# Patient Record
Sex: FEMALE | Race: WHITE | Employment: UNEMPLOYED | URBAN - METROPOLITAN AREA
[De-identification: names, ages, dates, MRNs, and addresses within clinical notes are randomized per-mention and may not be internally consistent; named-entity substitution may affect disease eponyms.]

---

## 2018-09-20 ENCOUNTER — HOSPITAL ENCOUNTER (OUTPATIENT)
Age: 48
Discharge: HOME | End: 2018-09-20

## 2018-09-20 LAB
ALT SERPL-CCNC: 19 U/L (ref 0–32)
ANION GAP SERPL CALCULATED.3IONS-SCNC: 10 MMOL/L (ref 7–16)
AST SERPL-CCNC: 24 U/L (ref 0–31)
BASOPHILS ABSOLUTE: 0.03 E9/L (ref 0–0.2)
BASOPHILS RELATIVE PERCENT: 0.6 % (ref 0–2)
BUN BLDV-MCNC: 8 MG/DL (ref 6–20)
CALCIUM SERPL-MCNC: 9.2 MG/DL (ref 8.6–10.2)
CHLORIDE BLD-SCNC: 103 MMOL/L (ref 98–107)
CHOLESTEROL, TOTAL: 169 MG/DL (ref 0–199)
CO2: 28 MMOL/L (ref 22–29)
CORTISOL TOTAL: 8.52 MCG/DL (ref 2.68–18.4)
CREAT SERPL-MCNC: 0.6 MG/DL (ref 0.5–1)
EOSINOPHILS ABSOLUTE: 0.18 E9/L (ref 0.05–0.5)
EOSINOPHILS RELATIVE PERCENT: 3.6 % (ref 0–6)
FOLATE: 17.3 NG/ML (ref 4.8–24.2)
GFR AFRICAN AMERICAN: >60
GFR NON-AFRICAN AMERICAN: >60 ML/MIN/1.73
GLUCOSE BLD-MCNC: 91 MG/DL (ref 74–109)
HCT VFR BLD CALC: 40.1 % (ref 34–48)
HDLC SERPL-MCNC: 67 MG/DL
HEMOGLOBIN: 12.6 G/DL (ref 11.5–15.5)
IMMATURE GRANULOCYTES #: 0.02 E9/L
IMMATURE GRANULOCYTES %: 0.4 % (ref 0–5)
IRON SATURATION: 27 % (ref 15–50)
IRON: 99 MCG/DL (ref 37–145)
LDL CHOLESTEROL CALCULATED: 85 MG/DL (ref 0–99)
LYMPHOCYTES ABSOLUTE: 0.59 E9/L (ref 1.5–4)
LYMPHOCYTES RELATIVE PERCENT: 11.9 % (ref 20–42)
MAGNESIUM: 2.1 MG/DL (ref 1.6–2.6)
MCH RBC QN AUTO: 29 PG (ref 26–35)
MCHC RBC AUTO-ENTMCNC: 31.4 % (ref 32–34.5)
MCV RBC AUTO: 92.2 FL (ref 80–99.9)
MONOCYTES ABSOLUTE: 0.37 E9/L (ref 0.1–0.95)
MONOCYTES RELATIVE PERCENT: 7.4 % (ref 2–12)
NEUTROPHILS ABSOLUTE: 3.78 E9/L (ref 1.8–7.3)
NEUTROPHILS RELATIVE PERCENT: 76.1 % (ref 43–80)
PDW BLD-RTO: 14.5 FL (ref 11.5–15)
PLATELET # BLD: 221 E9/L (ref 130–450)
PMV BLD AUTO: 9.7 FL (ref 7–12)
POTASSIUM SERPL-SCNC: 4.2 MMOL/L (ref 3.5–5)
RBC # BLD: 4.35 E12/L (ref 3.5–5.5)
SEDIMENTATION RATE, ERYTHROCYTE: 19 MM/HR (ref 0–20)
SODIUM BLD-SCNC: 141 MMOL/L (ref 132–146)
T4 FREE: 1.47 NG/DL (ref 0.93–1.7)
TOTAL IRON BINDING CAPACITY: 361 MCG/DL (ref 250–450)
TRIGL SERPL-MCNC: 85 MG/DL (ref 0–149)
TSH SERPL DL<=0.05 MIU/L-ACNC: 0.04 UIU/ML (ref 0.27–4.2)
VITAMIN D 25-HYDROXY: 21 NG/ML (ref 30–100)
VLDLC SERPL CALC-MCNC: 17 MG/DL
WBC # BLD: 5 E9/L (ref 4.5–11.5)

## 2018-09-20 PROCEDURE — 85025 COMPLETE CBC W/AUTO DIFF WBC: CPT

## 2018-09-20 PROCEDURE — 84439 ASSAY OF FREE THYROXINE: CPT

## 2018-09-20 PROCEDURE — 84460 ALANINE AMINO (ALT) (SGPT): CPT

## 2018-09-20 PROCEDURE — 80048 BASIC METABOLIC PNL TOTAL CA: CPT

## 2018-09-20 PROCEDURE — 80061 LIPID PANEL: CPT

## 2018-09-20 PROCEDURE — 84450 TRANSFERASE (AST) (SGOT): CPT

## 2018-09-20 PROCEDURE — 82306 VITAMIN D 25 HYDROXY: CPT

## 2018-09-20 PROCEDURE — 82533 TOTAL CORTISOL: CPT

## 2018-09-20 PROCEDURE — 84443 ASSAY THYROID STIM HORMONE: CPT

## 2018-09-20 PROCEDURE — 83540 ASSAY OF IRON: CPT

## 2018-09-20 PROCEDURE — 83550 IRON BINDING TEST: CPT

## 2018-09-20 PROCEDURE — 85651 RBC SED RATE NONAUTOMATED: CPT

## 2018-09-20 PROCEDURE — 36415 COLL VENOUS BLD VENIPUNCTURE: CPT

## 2018-09-20 PROCEDURE — 82746 ASSAY OF FOLIC ACID SERUM: CPT

## 2018-09-20 PROCEDURE — 83735 ASSAY OF MAGNESIUM: CPT

## 2020-01-02 ENCOUNTER — APPOINTMENT (OUTPATIENT)
Dept: GENERAL RADIOLOGY | Age: 50
End: 2020-01-02
Payer: COMMERCIAL

## 2020-01-02 ENCOUNTER — HOSPITAL ENCOUNTER (OUTPATIENT)
Age: 50
Setting detail: OBSERVATION
Discharge: HOME | End: 2020-01-03
Attending: EMERGENCY MEDICINE | Admitting: INTERNAL MEDICINE
Payer: COMMERCIAL

## 2020-01-02 LAB
ANION GAP SERPL CALCULATED.3IONS-SCNC: 13 MMOL/L (ref 7–16)
BASOPHILS ABSOLUTE: 0.05 E9/L (ref 0–0.2)
BASOPHILS RELATIVE PERCENT: 0.9 % (ref 0–2)
BUN BLDV-MCNC: 10 MG/DL (ref 6–20)
CALCIUM SERPL-MCNC: 9.6 MG/DL (ref 8.6–10.2)
CHLORIDE BLD-SCNC: 102 MMOL/L (ref 98–107)
CO2: 24 MMOL/L (ref 22–29)
CREAT SERPL-MCNC: 0.6 MG/DL (ref 0.5–1)
EOSINOPHILS ABSOLUTE: 0.07 E9/L (ref 0.05–0.5)
EOSINOPHILS RELATIVE PERCENT: 1.2 % (ref 0–6)
GFR AFRICAN AMERICAN: >60
GFR NON-AFRICAN AMERICAN: >60 ML/MIN/1.73
GLUCOSE BLD-MCNC: 113 MG/DL (ref 74–99)
HCT VFR BLD CALC: 40.9 % (ref 34–48)
HEMOGLOBIN: 13.6 G/DL (ref 11.5–15.5)
IMMATURE GRANULOCYTES #: 0.02 E9/L
IMMATURE GRANULOCYTES %: 0.4 % (ref 0–5)
INFLUENZA A BY PCR: NOT DETECTED
INFLUENZA B BY PCR: NOT DETECTED
LYMPHOCYTES ABSOLUTE: 1.38 E9/L (ref 1.5–4)
LYMPHOCYTES RELATIVE PERCENT: 24.6 % (ref 20–42)
MCH RBC QN AUTO: 31.5 PG (ref 26–35)
MCHC RBC AUTO-ENTMCNC: 33.3 % (ref 32–34.5)
MCV RBC AUTO: 94.7 FL (ref 80–99.9)
MONOCYTES ABSOLUTE: 0.43 E9/L (ref 0.1–0.95)
MONOCYTES RELATIVE PERCENT: 7.7 % (ref 2–12)
NEUTROPHILS ABSOLUTE: 3.66 E9/L (ref 1.8–7.3)
NEUTROPHILS RELATIVE PERCENT: 65.2 % (ref 43–80)
PDW BLD-RTO: 12.9 FL (ref 11.5–15)
PLATELET # BLD: 245 E9/L (ref 130–450)
PMV BLD AUTO: 10.1 FL (ref 7–12)
POTASSIUM SERPL-SCNC: 4.2 MMOL/L (ref 3.5–5)
RBC # BLD: 4.32 E12/L (ref 3.5–5.5)
SODIUM BLD-SCNC: 139 MMOL/L (ref 132–146)
TROPONIN: <0.01 NG/ML (ref 0–0.03)
WBC # BLD: 5.6 E9/L (ref 4.5–11.5)

## 2020-01-02 PROCEDURE — 71045 X-RAY EXAM CHEST 1 VIEW: CPT

## 2020-01-02 PROCEDURE — 36415 COLL VENOUS BLD VENIPUNCTURE: CPT

## 2020-01-02 PROCEDURE — 6370000000 HC RX 637 (ALT 250 FOR IP): Performed by: EMERGENCY MEDICINE

## 2020-01-02 PROCEDURE — 93005 ELECTROCARDIOGRAM TRACING: CPT | Performed by: EMERGENCY MEDICINE

## 2020-01-02 PROCEDURE — 80048 BASIC METABOLIC PNL TOTAL CA: CPT

## 2020-01-02 PROCEDURE — 87502 INFLUENZA DNA AMP PROBE: CPT

## 2020-01-02 PROCEDURE — 84484 ASSAY OF TROPONIN QUANT: CPT

## 2020-01-02 PROCEDURE — 99285 EMERGENCY DEPT VISIT HI MDM: CPT

## 2020-01-02 PROCEDURE — 85025 COMPLETE CBC W/AUTO DIFF WBC: CPT

## 2020-01-02 RX ORDER — FAMOTIDINE 40 MG/1
40 TABLET, FILM COATED ORAL DAILY
Status: ON HOLD | COMMUNITY
End: 2020-01-03 | Stop reason: ALTCHOICE

## 2020-01-02 RX ORDER — ASPIRIN 81 MG/1
324 TABLET, CHEWABLE ORAL ONCE
Status: COMPLETED | OUTPATIENT
Start: 2020-01-02 | End: 2020-01-02

## 2020-01-02 RX ADMIN — ASPIRIN 81 MG CHEWABLE TABLET 242 MG: 81 TABLET CHEWABLE at 22:23

## 2020-01-02 ASSESSMENT — PAIN SCALES - GENERAL: PAINLEVEL_OUTOF10: 6

## 2020-01-02 ASSESSMENT — PAIN DESCRIPTION - ONSET: ONSET: GRADUAL

## 2020-01-02 ASSESSMENT — PAIN DESCRIPTION - PROGRESSION: CLINICAL_PROGRESSION: NOT CHANGED

## 2020-01-02 ASSESSMENT — PAIN DESCRIPTION - LOCATION: LOCATION: CHEST

## 2020-01-02 ASSESSMENT — PAIN DESCRIPTION - PAIN TYPE: TYPE: ACUTE PAIN

## 2020-01-02 ASSESSMENT — PAIN DESCRIPTION - FREQUENCY: FREQUENCY: CONTINUOUS

## 2020-01-02 ASSESSMENT — PAIN DESCRIPTION - DESCRIPTORS: DESCRIPTORS: ACHING;DISCOMFORT

## 2020-01-03 ENCOUNTER — APPOINTMENT (OUTPATIENT)
Dept: NUCLEAR MEDICINE | Age: 50
End: 2020-01-03
Payer: COMMERCIAL

## 2020-01-03 ENCOUNTER — APPOINTMENT (OUTPATIENT)
Dept: NON INVASIVE DIAGNOSTICS | Age: 50
End: 2020-01-03
Payer: COMMERCIAL

## 2020-01-03 VITALS
BODY MASS INDEX: 38.77 KG/M2 | HEART RATE: 74 BPM | RESPIRATION RATE: 18 BRPM | DIASTOLIC BLOOD PRESSURE: 58 MMHG | HEIGHT: 67 IN | SYSTOLIC BLOOD PRESSURE: 126 MMHG | OXYGEN SATURATION: 99 % | TEMPERATURE: 98.4 F | WEIGHT: 247 LBS

## 2020-01-03 PROBLEM — R07.2 PRECORDIAL CHEST PAIN: Status: ACTIVE | Noted: 2020-01-03

## 2020-01-03 PROBLEM — R07.9 CHEST PAIN: Status: ACTIVE | Noted: 2020-01-03

## 2020-01-03 LAB
ANION GAP SERPL CALCULATED.3IONS-SCNC: 15 MMOL/L (ref 7–16)
BUN BLDV-MCNC: 10 MG/DL (ref 6–20)
CALCIUM SERPL-MCNC: 9.4 MG/DL (ref 8.6–10.2)
CHLORIDE BLD-SCNC: 100 MMOL/L (ref 98–107)
CHOLESTEROL, FASTING: 194 MG/DL (ref 0–199)
CO2: 24 MMOL/L (ref 22–29)
CREAT SERPL-MCNC: 0.6 MG/DL (ref 0.5–1)
EKG ATRIAL RATE: 69 BPM
EKG P AXIS: 19 DEGREES
EKG P-R INTERVAL: 156 MS
EKG Q-T INTERVAL: 386 MS
EKG QRS DURATION: 88 MS
EKG QTC CALCULATION (BAZETT): 413 MS
EKG R AXIS: -21 DEGREES
EKG T AXIS: 33 DEGREES
EKG VENTRICULAR RATE: 69 BPM
GFR AFRICAN AMERICAN: >60
GFR NON-AFRICAN AMERICAN: >60 ML/MIN/1.73
GLUCOSE BLD-MCNC: 97 MG/DL (ref 74–99)
HCT VFR BLD CALC: 42.3 % (ref 34–48)
HDLC SERPL-MCNC: 63 MG/DL
HEMOGLOBIN: 13.5 G/DL (ref 11.5–15.5)
LDL CHOLESTEROL CALCULATED: 112 MG/DL (ref 0–99)
LV EF: 70 %
LVEF MODALITY: NORMAL
MCH RBC QN AUTO: 30.7 PG (ref 26–35)
MCHC RBC AUTO-ENTMCNC: 31.9 % (ref 32–34.5)
MCV RBC AUTO: 96.1 FL (ref 80–99.9)
PDW BLD-RTO: 13.2 FL (ref 11.5–15)
PLATELET # BLD: 228 E9/L (ref 130–450)
PMV BLD AUTO: 10.1 FL (ref 7–12)
POTASSIUM SERPL-SCNC: 3.8 MMOL/L (ref 3.5–5)
RBC # BLD: 4.4 E12/L (ref 3.5–5.5)
SODIUM BLD-SCNC: 139 MMOL/L (ref 132–146)
TRIGLYCERIDE, FASTING: 96 MG/DL (ref 0–149)
TROPONIN: <0.01 NG/ML (ref 0–0.03)
TROPONIN: <0.01 NG/ML (ref 0–0.03)
TSH SERPL DL<=0.05 MIU/L-ACNC: 0.5 UIU/ML (ref 0.27–4.2)
VITAMIN B-12: 376 PG/ML (ref 211–946)
VLDLC SERPL CALC-MCNC: 19 MG/DL
WBC # BLD: 3.9 E9/L (ref 4.5–11.5)

## 2020-01-03 PROCEDURE — 85027 COMPLETE CBC AUTOMATED: CPT

## 2020-01-03 PROCEDURE — 93016 CV STRESS TEST SUPVJ ONLY: CPT | Performed by: INTERNAL MEDICINE

## 2020-01-03 PROCEDURE — 78452 HT MUSCLE IMAGE SPECT MULT: CPT

## 2020-01-03 PROCEDURE — 84443 ASSAY THYROID STIM HORMONE: CPT

## 2020-01-03 PROCEDURE — 3430000000 HC RX DIAGNOSTIC RADIOPHARMACEUTICAL: Performed by: RADIOLOGY

## 2020-01-03 PROCEDURE — 82607 VITAMIN B-12: CPT

## 2020-01-03 PROCEDURE — G0378 HOSPITAL OBSERVATION PER HR: HCPCS

## 2020-01-03 PROCEDURE — 84484 ASSAY OF TROPONIN QUANT: CPT

## 2020-01-03 PROCEDURE — 99243 OFF/OP CNSLTJ NEW/EST LOW 30: CPT | Performed by: SURGERY

## 2020-01-03 PROCEDURE — 80048 BASIC METABOLIC PNL TOTAL CA: CPT

## 2020-01-03 PROCEDURE — 36415 COLL VENOUS BLD VENIPUNCTURE: CPT

## 2020-01-03 PROCEDURE — A9500 TC99M SESTAMIBI: HCPCS | Performed by: RADIOLOGY

## 2020-01-03 PROCEDURE — 80061 LIPID PANEL: CPT

## 2020-01-03 PROCEDURE — 93010 ELECTROCARDIOGRAM REPORT: CPT | Performed by: INTERNAL MEDICINE

## 2020-01-03 PROCEDURE — 93017 CV STRESS TEST TRACING ONLY: CPT

## 2020-01-03 PROCEDURE — 93018 CV STRESS TEST I&R ONLY: CPT | Performed by: INTERNAL MEDICINE

## 2020-01-03 RX ORDER — FAMOTIDINE 20 MG/1
20 TABLET, FILM COATED ORAL DAILY
Status: ON HOLD | COMMUNITY
End: 2020-01-03 | Stop reason: HOSPADM

## 2020-01-03 RX ORDER — LEVOTHYROXINE SODIUM 175 UG/1
175 TABLET ORAL
Status: DISCONTINUED | OUTPATIENT
Start: 2020-01-04 | End: 2020-01-03 | Stop reason: HOSPADM

## 2020-01-03 RX ORDER — LEVOTHYROXINE SODIUM 175 UG/1
175 TABLET ORAL DAILY
COMMUNITY
End: 2020-09-30

## 2020-01-03 RX ORDER — FAMOTIDINE 20 MG/1
20 TABLET, FILM COATED ORAL DAILY
Status: DISCONTINUED | OUTPATIENT
Start: 2020-01-04 | End: 2020-01-03 | Stop reason: HOSPADM

## 2020-01-03 RX ORDER — FAMOTIDINE 20 MG/1
20 TABLET, FILM COATED ORAL 2 TIMES DAILY
Qty: 60 TABLET | Refills: 0 | Status: SHIPPED | OUTPATIENT
Start: 2020-01-03 | End: 2020-09-30

## 2020-01-03 RX ORDER — FAMOTIDINE 20 MG/1
20 TABLET, FILM COATED ORAL 2 TIMES DAILY
Qty: 60 TABLET | Refills: 0 | Status: SHIPPED | OUTPATIENT
Start: 2020-01-03 | End: 2020-01-03 | Stop reason: SDUPTHER

## 2020-01-03 RX ORDER — SODIUM CHLORIDE 0.9 % (FLUSH) 0.9 %
10 SYRINGE (ML) INJECTION PRN
Status: DISCONTINUED | OUTPATIENT
Start: 2020-01-03 | End: 2020-01-03 | Stop reason: HOSPADM

## 2020-01-03 RX ORDER — ONDANSETRON 2 MG/ML
4 INJECTION INTRAMUSCULAR; INTRAVENOUS EVERY 6 HOURS PRN
Status: DISCONTINUED | OUTPATIENT
Start: 2020-01-03 | End: 2020-01-03 | Stop reason: HOSPADM

## 2020-01-03 RX ORDER — SODIUM CHLORIDE 0.9 % (FLUSH) 0.9 %
10 SYRINGE (ML) INJECTION PRN
Status: DISCONTINUED | OUTPATIENT
Start: 2020-01-03 | End: 2020-01-03 | Stop reason: SDUPTHER

## 2020-01-03 RX ORDER — SODIUM CHLORIDE 0.9 % (FLUSH) 0.9 %
10 SYRINGE (ML) INJECTION EVERY 12 HOURS SCHEDULED
Status: DISCONTINUED | OUTPATIENT
Start: 2020-01-03 | End: 2020-01-03 | Stop reason: HOSPADM

## 2020-01-03 RX ORDER — LEVOTHYROXINE SODIUM 0.2 MG/1
200 TABLET ORAL
Status: DISCONTINUED | OUTPATIENT
Start: 2020-01-03 | End: 2020-01-03

## 2020-01-03 RX ORDER — FAMOTIDINE 20 MG/1
40 TABLET, FILM COATED ORAL DAILY
Status: DISCONTINUED | OUTPATIENT
Start: 2020-01-03 | End: 2020-01-03

## 2020-01-03 RX ADMIN — Medication 12 MILLICURIE: at 08:30

## 2020-01-03 RX ADMIN — Medication 35 MILLICURIE: at 11:35

## 2020-01-03 ASSESSMENT — HEART SCORE: ECG: 1

## 2020-01-03 NOTE — PROCEDURES
Brian Lacey is a 52 y.o. female patient. 1. Precordial chest pain    2. Abnormal EKG    3. Elevated blood pressure reading      Past Medical History:   Diagnosis Date    Thyroid disease      Blood pressure 133/70, pulse 68, temperature 98.5 °F (36.9 °C), temperature source Oral, resp. rate 18, height 5' 7\" (1.702 m), weight 247 lb (112 kg), SpO2 97 %. Procedures Following placement of an intravenous catheter 10 millicuries of technetium 99m sestamibi was administered with resting SPECT images obtained approximately 45 minutes post injection. After completion of resting images, the patient exercised for 3:15 minutes on an accelerated Zia protocol treadmill achieving 6.4 METS of activity and 95% MPHR. No anginal chest pain or cardiac arrhythmias were noted. A normal blood pressure response to exercise was recorded. No electrocardiographic changes were noted. One mintue prior to the completion of exercise 30 millicuries of technetium 99m sestamibi was injected with post stress SPECT images obtained approximately 30 minutes post stress. Perfusion images pending.     Geoffrey Means MD  1/3/2020

## 2020-01-03 NOTE — ED PROVIDER NOTES
HPI:  20, Time: 10:12 PM        Effie Simon is a 52 y.o. female presenting to the ED for chest pain, beginning 3 hours ago. The complaint has been persistent, severe in severity, and worsened by nothing. She states the episode of chest pain began while she was at rest but after she had been walking around her store and she stated that symptoms were severe that she thought she might pass out. She did not have any palpitations, no syncope nor any nausea vomiting. She states she might of had mild diaphoresis. No radiation to the neck nor jaw nor to the back no radiation to the left arm reported. No other complaints at all reported. Review of Systems:   Pertinent positives and negatives are stated within HPI, all other systems reviewed and are negative.    --------------------------------------------- PAST HISTORY ---------------------------------------------  Past Medical History:  has a past medical history of Thyroid disease. Past Surgical History:  has a past surgical history that includes Cholecystectomy; Tubal ligation; Gastric bypass surgery;  section; hernia repair; Hysterectomy; and Abdomen surgery. Social History:  reports that she has never smoked. She has never used smokeless tobacco. She reports that she does not drink alcohol or use drugs. Family History: family history is not on file. The patients home medications have been reviewed. Allergies: Avelox [moxifloxacin hcl in nacl]; Cephalexin; Ciprofloxacin; Magnesium sulfate; Penicillins;  Percocet [oxycodone-acetaminophen]; and Sulfa antibiotics    -------------------------------------------------- RESULTS -------------------------------------------------  All laboratory and radiology results have been personally reviewed by myself   LABS:  Results for orders placed or performed during the hospital encounter of 20   Rapid influenza A/B antigens   Result Value Ref Range    Influenza A by PCR Not Detected Not patient and discussed todays results, in addition to providing specific details for the plan of care and counseling regarding the diagnosis and prognosis. Questions are answered at this time and they are agreeable with the plan. Radiology Procedure Waiver   Name: James Slater  : 1970  MRN: [id number]    Date:  20    Time: 10:12 PM    Benefits of immediately proceeding with Radiology exam(s) without pre-testing outweigh the risks or are not indicated as specified below and therefore the following is/are being waived:    [x] Pregnancy test   [] Patients LMP on-time and regular.   [] Patient had Tubal Ligation or has other Contraception Device. [] Patient  is Menopausal or Premenarcheal.    [x] Patient had Full or Partial Hysterectomy. [] Protocol for Iodine allergy    [] MRI Questionnaire     [] BUN/Creatinine   [] Patient age w/no hx of renal dysfunction. [] Patient on Dialysis. [] Recent Normal Labs. Electronically signed by Becki Rodney MD on 20 at 10:12 PM          --------------------------------- IMPRESSION AND DISPOSITION ---------------------------------    IMPRESSION  1. Precordial chest pain    2. Abnormal EKG    3. Elevated blood pressure reading        DISPOSITION  Disposition: Admit to telemetry OBS @ [facility]  Patient condition is stable      NOTE: This report was transcribed using voice recognition software.  Every effort was made to ensure accuracy; however, inadvertent computerized transcription errors may be present        Sree Ramos MD  20 0025

## 2020-01-03 NOTE — CONSULTS
CHOLECYSTECTOMY      GASTRIC BYPASS SURGERY      HERNIA REPAIR      HYSTERECTOMY      TUBAL LIGATION     Abdominal surgery was panniculectomy with concomitant open suprapubic hernia repair w/ mesh ~4-5 yr ago (Dr Brayan Perry at [facility])    Medications - Prior to Admission and Current Meds     Prior to Admission medications    Medication Sig Start Date End Date Taking? Authorizing Provider   famotidine (PEPCID) 40 MG tablet Take 40 mg by mouth daily   Yes Historical Provider, MD   levothyroxine (SYNTHROID) 200 MCG tablet Take 200 mcg by mouth daily. Yes Historical Provider, MD   estradiol (ESTRACE VAGINAL) 0.1 MG/GM vaginal cream Apply pea size amount vaginally 2- 3 times a week at night time 7/26/19   [facility]   clotrimazole-betamethasone (LOTRISONE) 1-0.05 % cream Apply topically 2 times daily. 7/26/19   MARY Snell   Pancrelipase, Lip-Prot-Amyl, (CREON PO) Take by mouth    Historical Provider, MD   (not taking creon)  Just taking levothyroxine and some vitamins. Inpatient:  famotidine (PEPCID) tablet 40 mg, Daily  levothyroxine (SYNTHROID) tablet 200 mcg, QAM AC  sodium chloride flush 0.9 % injection 10 mL, 2 times per day  sodium chloride flush 0.9 % injection 10 mL, PRN  magnesium hydroxide (MILK OF MAGNESIA) 400 MG/5ML suspension 30 mL, Daily PRN  ondansetron (ZOFRAN) injection 4 mg, Q6H PRN  enoxaparin (LOVENOX) injection 40 mg, Daily        Allergies   Avelox [moxifloxacin hcl in nacl]; Cephalexin; Ciprofloxacin; Magnesium sulfate; Penicillins; Percocet [oxycodone-acetaminophen]; and Sulfa antibiotics    Social History     Social History     Tobacco History     Smoking Status  Never Smoker    Smokeless Tobacco Use  Never Used          Alcohol History     Alcohol Use Status  No          Drug Use     Drug Use Status  No          Sexual Activity     Sexually Active  Not Asked                Family History   History reviewed. No pertinent family history.     Review of Systems

## 2020-01-03 NOTE — H&P
Percocet [oxycodone-acetaminophen]; and Sulfa antibiotics    Social History:      The patient currently lives at home with family     TOBACCO:   reports that she has never smoked. She has never used smokeless tobacco.  ETOH:   reports no history of alcohol use. Family History:      Reviewed in detail and negative for DM, CAD, Cancer, CVA. Positive as follows:    History reviewed. No pertinent family history. REVIEW OF SYSTEMS:   Pertinent positives as noted in the HPI. All other systems reviewed and negative. PHYSICAL EXAM:    /70   Pulse 68   Temp 98.5 °F (36.9 °C) (Oral)   Resp 18   Ht 5' 7\" (1.702 m)   Wt 247 lb (112 kg)   SpO2 97%   BMI 38.69 kg/m²     General appearance:  No apparent distress, appears stated age and cooperative. HEENT:  Normal cephalic, atraumatic without obvious deformity. Pupils equal, round, and reactive to light. Extra ocular muscles intact. Conjunctivae/corneas clear. Neck: Supple, with full range of motion. No jugular venous distention. Trachea midline. Respiratory:  Normal respiratory effort. Clear to auscultation, bilaterally without Rales/Wheezes/Rhonchi. Cardiovascular:  Regular rate and rhythm with normal S1/S2 without murmurs, rubs or gallops. Abdomen: Soft, non-tender, non-distended with normal bowel sounds. Musculoskeletal:  No clubbing, cyanosis or edema bilaterally. Full range of motion without deformity. Skin: Skin color, texture, turgor normal.  No rashes or lesions. Neurologic:  Neurovascularly intact without any focal sensory/motor deficits. Cranial nerves: II-XII intact, grossly non-focal.  Psychiatric:  Alert and oriented, thought content appropriate, normal insight    XR CHEST PORTABLE   Final Result      No acute chest process.        NM MYOCARDIAL SPECT REST EXERCISE OR RX    (Results Pending)       Labs:     Recent Labs     01/02/20  2219   WBC 5.6   HGB 13.6   HCT 40.9        Recent Labs     01/02/20  2219      K 4.2   CL 102   CO2 24   BUN 10   CREATININE 0.6   CALCIUM 9.6     No results for input(s): AST, ALT, BILIDIR, BILITOT, ALKPHOS in the last 72 hours. No results for input(s): INR in the last 72 hours. Recent Labs     01/02/20  2219   TROPONINI <0.01       Urinalysis:      Lab Results   Component Value Date    NITRU Negative 09/24/2016    BLOODU trace-intact 07/26/2019    BLOODU Negative 09/24/2016    SPECGRAV 1.010 07/26/2019    SPECGRAV 1.010 09/24/2016    GLUCOSEU negative 07/26/2019    GLUCOSEU Negative 09/24/2016         ASSESSMENT:  Atypical chest pain  Hx of gastric bypass  Hypothyroidism  Hx of esophageal spasms    PLAN:  Trend troponins, stress test in am, FLP   Check TSH given increased fatigue recently  Surgery consulted as symptoms seem more GI related (improved with pepcid) and could be related to her hx of bypass       DVT Prophylaxis: lovenox  Diet: Diet NPO Effective Now  Code Status: Full Code    PT/OT Eval Status: na    Dispo - home at [place], [place]    Thank you Vita Boone DO for the opportunity to be involved in this patient's care. If you have any questions or concerns please feel free to contact me at [phone].

## 2020-01-06 ENCOUNTER — TELEPHONE (OUTPATIENT)
Dept: SURGERY | Age: 50
End: 2020-01-06

## 2020-01-06 NOTE — TELEPHONE ENCOUNTER
FARA Maldonado called and spoke to [facility] and scheduled PT for EGD & Colonoscopy on 1/29/20 @ 12:45pm with Dr. Baron Beckham. PT denied taking any ASA products or blood thinners. PT verbalized she understood prep instructions, and NPO after midnight. PT verbalized that she understood appointment date/time, as well as to arrive 1 hours prior to procedure. PT advised on where to park and enter the hospital at for procedure. PT has been told to make sure they have a ride to and from procedure as they are not allowed to drive after procedure. PT procedure letter was mailed to them with all instructions also on it. MA instructed PT to call the office at [phone] with any questions, comments, or concerns about the procedure. MA will route message to  to send in Mirilax prep for patient to pharmacy in chart.       Electronically signed by Kimberly Edwards MA on 1/6/20 at 10:46 AM

## 2020-01-07 RX ORDER — POLYETHYLENE GLYCOL 3350 17 G/17G
510 POWDER, FOR SOLUTION ORAL DAILY
Qty: 510 G | Refills: 0 | Status: SHIPPED | OUTPATIENT
Start: 2020-01-07 | End: 2020-01-08

## 2020-01-07 NOTE — TELEPHONE ENCOUNTER
Due to her h/o gastric bypass she may want to prep over 2 days. Day #1 -- take one laxative by mouth and take 1/2 prep;  Eat easily digestible, low fiber foods.   Day #2--take 2nd laxative and finish prep and only take clear liquids  Day #3 -- colonoscopy

## 2020-01-07 NOTE — TELEPHONE ENCOUNTER
She will need miralax sent in.       Electronically signed by Sabiha Frazier MA on 1/7/20 at 9:57 AM

## 2020-01-07 NOTE — TELEPHONE ENCOUNTER
MA called and explained  concern. Patient states she doesn't have any trouble drinking so will be able to do the one day prep. She just has trouble with sugars so will have to just mix it in water. MA advised that was ok but if needed to do 2 day prep to let us know and can go over all the prep instructions for that. MA advised the prep was called into the pharmacy and the instructions letter was mailed out for her to follow.         Electronically signed by Haim Mitchell MA on 1/7/20 at 2:13 PM

## 2020-01-15 ENCOUNTER — TELEPHONE (OUTPATIENT)
Dept: SURGERY | Age: 50
End: 2020-01-15

## 2020-01-15 NOTE — TELEPHONE ENCOUNTER
MA received a call from patient in regards to her prep. Patient states she is unable to do Doculax makes her doubled over in pain ever since her bypass surgery. MA asked if she has ever taken other stool softners, Pt states she takes Milk of magnisum if needed but that's it. MA advised she would send message to  in regards to this and see what her advisement is.         Electronically signed by Matteo Kelley MA on 1/15/20 at 1:22 PM

## 2020-01-20 ENCOUNTER — TELEPHONE (OUTPATIENT)
Dept: SURGERY | Age: 50
End: 2020-01-20

## 2020-01-24 NOTE — PROGRESS NOTES
[facility] PRE-ADMISSION TESTING ENDOSCOPY INSTRUCTIONS- [facility]-phone number:[phone]    ENDOSCOPY INSTRUCTIONS:   [x] Bowel prep instructions reviewed. [x] Nothing by mouth after midnight, including gum, candy, mints, or water. Please follow your surgeons instructions if you are required to complete a bowel prep. Colonoscopy- no solid food-only clear liquids the day prior). [] You may brush your teeth, gargle, but do NOT swallow water. [x] Do not wear makeup, lotions, powders, deodorant. Nail polish as directed by the nurse. [x] Arrange transportation with a responsible adult  to and from the hospital. If you do not have a responsible adult  to transport you, you will need to make arrangements with a medical transportation company (i.e. [employer]. A Uber/taxi/bus is not appropriate unless you are accompanied by a responsible adult ). Arrange for someone to be with you for the remainder of the day and for 24 hours after your procedure due to having had anesthesia. Who will be your  for transportation?____HUSBAND______________   Who will be staying with you for 24 hrs after your procedure?______HUSBAND____________    PARKING INSTRUCTIONS:   [x] Arrival Time:___1115_____________________  · [x] Parking lot  \"I\" OR 1 is located on [facility] (the corner of [facility]). To enter, press the button and the gate will lift. A free token will be provided to exit the lot. One car per patient is allowed to park in this lot. All other cars are to park on [address] [place] [address] either in the parking garage or the handicap lot. [] To reach the [facility] lobby from [address] [place] [address], upon entering the hospital, take elevator B to the 3rd floor. EDUCATION INSTRUCTIONS:  [x] Bring a complete list of your medications, please write the last time you took the medicine, give this list to the nurse.   [x] Take the following medications the morning of surgery with 1-2 ounces of water:   [] Stop herbal supplements and vitamins 5 days before your surgery. [] DO NOT take any diabetic medicine the morning of surgery. Follow instructions for insulin the day before surgery. [] If you are diabetic and your blood sugar is low or you feel symptomatic, you may drink 1-2 ounces of apple juice or take a glucose tablet. The morning of your procedure, you may call the pre-op area if you have concerns about your blood sugar [phone]. [] Use your inhalers the morning of surgery. Bring your emergency inhaler with you day of surgery. [x] Follow physician instructions regarding any blood thinners you may be taking. WHAT TO EXPECT:  [x] The day of your procedure you will be greeted and checked in by the [facility].  In addition, you will be registered in the [facility] by a Patient Access Representative. Please bring your photo ID and insurance card. A nurse will greet you in accordance to the time you are needed in the pre-op area to prepare you for surgery. Please do not be discouraged if you are not greeted in the order you arrive as there are many variables that are involved in patient preparation. Your patience is greatly appreciated as you wait for your nurse. Please bring in items such as: books, magazines, newspapers, electronics, or any other items  to occupy your time in the waiting area. [x]  Delays may occur. Staff will make a sincere effort to keep you informed of delays. If any delays occur with your procedure, we apologize ahead of time for your inconvenience as we recognize the value of your time.

## 2020-01-28 ENCOUNTER — PREP FOR PROCEDURE (OUTPATIENT)
Dept: SURGERY | Age: 50
End: 2020-01-28

## 2020-01-28 RX ORDER — SODIUM CHLORIDE 9 MG/ML
INJECTION, SOLUTION INTRAVENOUS CONTINUOUS
Status: CANCELLED | OUTPATIENT
Start: 2020-01-28

## 2020-01-28 RX ORDER — SODIUM CHLORIDE 0.9 % (FLUSH) 0.9 %
10 SYRINGE (ML) INJECTION PRN
Status: CANCELLED | OUTPATIENT
Start: 2020-01-28

## 2020-01-28 RX ORDER — SODIUM CHLORIDE 0.9 % (FLUSH) 0.9 %
10 SYRINGE (ML) INJECTION EVERY 12 HOURS SCHEDULED
Status: CANCELLED | OUTPATIENT
Start: 2020-01-28

## 2020-01-28 NOTE — H&P
[facility]  HISTORY & PHYSICAL  ATTENDING NOTE    Chief Complaint           Chief Complaint   Patient presents with    Chest Pain       started today at 1500    Chest pain yesterday (20)     History Obtained From   patient     History of Present Illness   Martha Delvalle is a 52 y.o. female with RNY gastric bypass done at [facility] in  who presents for severe chest pain. Yesterday afternoon she was walking in the mall when severe constant substernal pain started, lasted 15 min, relieved 5min after taking pepcid. Different from her usual LUQ \"stomach pain\", but she does complain this month of more frequent LUQ pains and intermittent \"heartburns without the burn\" (like a full sensation) in her lower chest lately, often triggered by taking vitamins. Denies prior history of ulcers. She does some frequent pulling/rolling (some minor lifting) of her 20yo daughter who is immobile. Has joint pain, took 2 advil ~4 weeks ago. She took 1 baby aspirin last night in case she was having a heart attack. Denies steroids. Had some vinegar BBQ sauce and saurkraut 2 nights ago. Tolerated small bland dinner last night except increased burping and brief nausea, today just has continual fullness in lower chest like indigestion. Watery yellow diarrhea x2 last night, with some \"tingling in my head\" and cold sweats followed by feeling hot. Whole family was sick about a month ago with stomach flu briefly.  Had 2 iron infusions a few months ago and been getting B12 shots, but denies melena/hematochezia (except some hemorrhoidal blood when wiping 3 weeks ago).       Past Medical History      Past Medical History        Past Medical History:   Diagnosis Date    Thyroid disease              Past Surgical History      Past Surgical History         Past Surgical History:   Procedure Laterality Date    ABDOMEN SURGERY         SECTION         x2    CHOLECYSTECTOMY        GASTRIC BYPASS SURGERY        HERNIA normal limits.      No acute chest process.         Assessment                Hospital Problems            Last Modified POA     Chest pain 1/3/2020 Yes             48y/o F with GERD vs. Esophageal spasms and age as a risk factor for colon cancer    Plan for EGD and colonoscopy    I recommended esophagogastroduodenoscopy with possible biopsy and explained the risk, benefits, expected outcome, and alternatives to the procedure. Risks included but are not limited to bleeding, infection, respiratory distress, hypotension, and perforation of the esophagus, stomach, or duodenum. Patient understands and is in agreement. Colonoscopy. The patient was explained the risks/benefits/alternatives/expected outcomes of the procedure. The patient was explained the risks of the procedure, including, but not limited to, the risk of reaction to the anesthesia medicine and the risk of perforation requiring further surgery. The patient was informed that they may require biopsy or polypectomy. These procedures may increase the risk of complication. All questions were answered. The patient verbalized understanding and agreed to proceed.     Jaspreet Montez MD, MSc, FACS  1/28/2020  3:03 PM

## 2020-01-28 NOTE — H&P (VIEW-ONLY)
[facility]  HISTORY & PHYSICAL  ATTENDING NOTE    Chief Complaint           Chief Complaint   Patient presents with    Chest Pain       started today at 1500    Chest pain yesterday (20)     History Obtained From   patient     History of Present Illness   Martha Colon is a 52 y.o. female with RNY gastric bypass done at [facility] in  who presents for severe chest pain. Yesterday afternoon she was walking in the mall when severe constant substernal pain started, lasted 15 min, relieved 5min after taking pepcid. Different from her usual LUQ \"stomach pain\", but she does complain this month of more frequent LUQ pains and intermittent \"heartburns without the burn\" (like a full sensation) in her lower chest lately, often triggered by taking vitamins. Denies prior history of ulcers. She does some frequent pulling/rolling (some minor lifting) of her 18yo daughter who is immobile. Has joint pain, took 2 advil ~4 weeks ago. She took 1 baby aspirin last night in case she was having a heart attack. Denies steroids. Had some vinegar BBQ sauce and saurkraut 2 nights ago. Tolerated small bland dinner last night except increased burping and brief nausea, today just has continual fullness in lower chest like indigestion. Watery yellow diarrhea x2 last night, with some \"tingling in my head\" and cold sweats followed by feeling hot. Whole family was sick about a month ago with stomach flu briefly.  Had 2 iron infusions a few months ago and been getting B12 shots, but denies melena/hematochezia (except some hemorrhoidal blood when wiping 3 weeks ago).       Past Medical History      Past Medical History        Past Medical History:   Diagnosis Date    Thyroid disease              Past Surgical History      Past Surgical History         Past Surgical History:   Procedure Laterality Date    ABDOMEN SURGERY         SECTION         x2    CHOLECYSTECTOMY        GASTRIC BYPASS SURGERY        HERNIA REPAIR        HYSTERECTOMY        TUBAL LIGATION          Abdominal surgery was panniculectomy with concomitant open suprapubic hernia repair w/ mesh ~4-5 yr ago (Dr Becca Miller at [facility])     Medications - Prior to Admission and Current Meds      Home Medications           Prior to Admission medications    Medication Sig Start Date End Date Taking? Authorizing Provider   famotidine (PEPCID) 40 MG tablet Take 40 mg by mouth daily     Yes Historical Provider, MD   levothyroxine (SYNTHROID) 200 MCG tablet Take 200 mcg by mouth daily.       Yes Historical Provider, MD   estradiol (ESTRACE VAGINAL) 0.1 MG/GM vaginal cream Apply pea size amount vaginally 2- 3 times a week at night time 7/26/19     [facility]   clotrimazole-betamethasone (LOTRISONE) 1-0.05 % cream Apply topically 2 times daily. 7/26/19     [facility]   Pancrelipase, Lip-Prot-Amyl, (CREON PO) Take by mouth       Historical Provider, MD      (not taking creon)  Just taking levothyroxine and some vitamins.     Inpatient:    Current Meds Link used for Sign Out Report   famotidine (PEPCID) tablet 40 mg, Daily  levothyroxine (SYNTHROID) tablet 200 mcg, QAM AC  sodium chloride flush 0.9 % injection 10 mL, 2 times per day  sodium chloride flush 0.9 % injection 10 mL, PRN  magnesium hydroxide (MILK OF MAGNESIA) 400 MG/5ML suspension 30 mL, Daily PRN  ondansetron (ZOFRAN) injection 4 mg, Q6H PRN  enoxaparin (LOVENOX) injection 40 mg, Daily            Allergies   Avelox [moxifloxacin hcl in nacl]; Cephalexin; Ciprofloxacin; Magnesium sulfate; Penicillins;  Percocet [oxycodone-acetaminophen]; and Sulfa antibiotics     Social History          Social History           Tobacco History           Smoking Status  Never Smoker           Smokeless Tobacco Use  Never Used                  Alcohol History           Alcohol Use Status  No                  Drug Use           Drug Use Status  No                  Sexual Activity           Sexually

## 2020-01-29 ENCOUNTER — ANESTHESIA (OUTPATIENT)
Dept: ENDOSCOPY | Age: 50
End: 2020-01-29
Payer: COMMERCIAL

## 2020-01-29 ENCOUNTER — HOSPITAL ENCOUNTER (OUTPATIENT)
Age: 50
Setting detail: OUTPATIENT SURGERY
Discharge: HOME | End: 2020-01-29
Attending: SURGERY | Admitting: SURGERY
Payer: COMMERCIAL

## 2020-01-29 ENCOUNTER — ANESTHESIA EVENT (OUTPATIENT)
Dept: ENDOSCOPY | Age: 50
End: 2020-01-29
Payer: COMMERCIAL

## 2020-01-29 VITALS
WEIGHT: 247 LBS | RESPIRATION RATE: 16 BRPM | HEART RATE: 78 BPM | SYSTOLIC BLOOD PRESSURE: 124 MMHG | TEMPERATURE: 98 F | BODY MASS INDEX: 43.77 KG/M2 | DIASTOLIC BLOOD PRESSURE: 74 MMHG | HEIGHT: 63 IN | OXYGEN SATURATION: 98 %

## 2020-01-29 VITALS
RESPIRATION RATE: 15 BRPM | SYSTOLIC BLOOD PRESSURE: 93 MMHG | OXYGEN SATURATION: 98 % | DIASTOLIC BLOOD PRESSURE: 59 MMHG

## 2020-01-29 PROCEDURE — 2709999900 HC NON-CHARGEABLE SUPPLY: Performed by: SURGERY

## 2020-01-29 PROCEDURE — 3609027000 HC COLONOSCOPY: Performed by: SURGERY

## 2020-01-29 PROCEDURE — 88305 TISSUE EXAM BY PATHOLOGIST: CPT

## 2020-01-29 PROCEDURE — 3700000001 HC ADD 15 MINUTES (ANESTHESIA): Performed by: SURGERY

## 2020-01-29 PROCEDURE — 3700000000 HC ANESTHESIA ATTENDED CARE: Performed by: SURGERY

## 2020-01-29 PROCEDURE — 2580000003 HC RX 258: Performed by: SURGERY

## 2020-01-29 PROCEDURE — 43239 EGD BIOPSY SINGLE/MULTIPLE: CPT | Performed by: SURGERY

## 2020-01-29 PROCEDURE — 6360000002 HC RX W HCPCS: Performed by: ANESTHESIOLOGIST ASSISTANT

## 2020-01-29 PROCEDURE — 88342 IMHCHEM/IMCYTCHM 1ST ANTB: CPT

## 2020-01-29 PROCEDURE — 7100000011 HC PHASE II RECOVERY - ADDTL 15 MIN: Performed by: SURGERY

## 2020-01-29 PROCEDURE — 7100000010 HC PHASE II RECOVERY - FIRST 15 MIN: Performed by: SURGERY

## 2020-01-29 PROCEDURE — 3609012400 HC EGD TRANSORAL BIOPSY SINGLE/MULTIPLE: Performed by: SURGERY

## 2020-01-29 PROCEDURE — 45378 DIAGNOSTIC COLONOSCOPY: CPT | Performed by: SURGERY

## 2020-01-29 RX ORDER — SODIUM CHLORIDE 0.9 % (FLUSH) 0.9 %
10 SYRINGE (ML) INJECTION EVERY 12 HOURS SCHEDULED
Status: DISCONTINUED | OUTPATIENT
Start: 2020-01-29 | End: 2020-01-29 | Stop reason: HOSPADM

## 2020-01-29 RX ORDER — PROPOFOL 10 MG/ML
INJECTION, EMULSION INTRAVENOUS PRN
Status: DISCONTINUED | OUTPATIENT
Start: 2020-01-29 | End: 2020-01-29 | Stop reason: SDUPTHER

## 2020-01-29 RX ORDER — SODIUM CHLORIDE 0.9 % (FLUSH) 0.9 %
10 SYRINGE (ML) INJECTION PRN
Status: DISCONTINUED | OUTPATIENT
Start: 2020-01-29 | End: 2020-01-29 | Stop reason: HOSPADM

## 2020-01-29 RX ORDER — MIDAZOLAM HYDROCHLORIDE 1 MG/ML
INJECTION INTRAMUSCULAR; INTRAVENOUS PRN
Status: DISCONTINUED | OUTPATIENT
Start: 2020-01-29 | End: 2020-01-29 | Stop reason: SDUPTHER

## 2020-01-29 RX ORDER — FENTANYL CITRATE 50 UG/ML
INJECTION, SOLUTION INTRAMUSCULAR; INTRAVENOUS PRN
Status: DISCONTINUED | OUTPATIENT
Start: 2020-01-29 | End: 2020-01-29 | Stop reason: SDUPTHER

## 2020-01-29 RX ORDER — SODIUM CHLORIDE 9 MG/ML
INJECTION, SOLUTION INTRAVENOUS CONTINUOUS
Status: DISCONTINUED | OUTPATIENT
Start: 2020-01-29 | End: 2020-01-29 | Stop reason: HOSPADM

## 2020-01-29 RX ADMIN — FENTANYL CITRATE 50 MCG: 50 INJECTION, SOLUTION INTRAMUSCULAR; INTRAVENOUS at 12:51

## 2020-01-29 RX ADMIN — SODIUM CHLORIDE: 9 INJECTION, SOLUTION INTRAVENOUS at 11:32

## 2020-01-29 RX ADMIN — MIDAZOLAM 2 MG: 1 INJECTION INTRAMUSCULAR; INTRAVENOUS at 12:44

## 2020-01-29 RX ADMIN — PROPOFOL 440 MG: 10 INJECTION, EMULSION INTRAVENOUS at 12:44

## 2020-01-29 RX ADMIN — FENTANYL CITRATE 50 MCG: 50 INJECTION, SOLUTION INTRAMUSCULAR; INTRAVENOUS at 12:44

## 2020-01-29 ASSESSMENT — PAIN SCALES - GENERAL
PAINLEVEL_OUTOF10: 0

## 2020-01-29 ASSESSMENT — PAIN DESCRIPTION - LOCATION: LOCATION: ABDOMEN

## 2020-01-29 ASSESSMENT — PAIN DESCRIPTION - PAIN TYPE: TYPE: SURGICAL PAIN

## 2020-01-29 ASSESSMENT — PAIN - FUNCTIONAL ASSESSMENT: PAIN_FUNCTIONAL_ASSESSMENT: 0-10

## 2020-01-29 NOTE — OP NOTE
[facility]  PROCEDURE NOTE    DATE OF PROCEDURE: 1/29/2020    SURGEON: Larisa Sosa MD    ASSISTANT: None    PREOPERATIVE DIAGNOSIS: High risk colorectal cancer screening with history of polyps    POSTOPERATIVE DIAGNOSIS: few diverticuli, small internal hemorrhoids    OPERATION: Total colonoscopy     ANESTHESIA: Local monitored anesthesia. ESTIMATED BLOOD LOSS (ml): none    COMPLICATIONS: None    SPECIMENS:  Was Not Obtained    HISTORY: The patient is a 52y.o. year old female with history of above preop diagnosis. I recommended colonoscopy with possible biopsy or polypectomy and I explained the risk, benefits, expected outcome, and alternatives to the procedure. Risks included but are not limited to bleeding, infection, respiratory distress, hypotension, and perforation of the colon. The patient understands and is in agreement. PROCEDURE: The patient was given IV conscious sedation per anesthesia. The patient was given supplemental oxygen by nasal cannula. The colonoscope was inserted per rectum and advanced under direct vision to the cecum without difficulty. The prep was excellent so exam was adequate. FINDINGS:  Cecum/Ascending colon: few small diverticuli    Transverse colon: normal    Descending/Sigmoid colon: normal    Rectum/Anus: examined in normal and retroflexed positions and was positive for small internal hemorrhoid    The colon was decompressed and the scope was removed. The withdraw time was approximately 8 minutes. The patient tolerated the procedure well. ASSESSMENT/PLAN:   1. Diverticulosis--high fiber diet  2. Hemorrhoids--OTC meds PRN  3.  Recommend follow up colonoscopy in 10 years    Electronically signed by Larisa Sosa MD on 1/29/20 at 1:12 PM

## 2020-01-29 NOTE — PROGRESS NOTES
Discharge instructions given, medications and follow up instructions reviewed. Patient and family member verbalized understanding, no other noted or stated problems at this time. Patient will follow up with physicians as directed. Patient discharged in stable condition via wheelchair with family and ancillary personnel to vehicle.

## 2020-01-29 NOTE — ANESTHESIA PRE PROCEDURE
given: Not Answered      Vital Signs (Current): There were no vitals filed for this visit. BP Readings from Last 3 Encounters:   01/03/20 (!) 126/58   07/26/19 130/84   02/04/18 (!) 142/79       NPO Status:                                                                                 BMI:   Wt Readings from Last 3 Encounters:   01/02/20 247 lb (112 kg)   07/26/19 252 lb (114.3 kg)   02/04/18 240 lb (108.9 kg)     There is no height or weight on file to calculate BMI.    CBC:   Lab Results   Component Value Date    WBC 3.9 01/03/2020    RBC 4.40 01/03/2020    HGB 13.5 01/03/2020    HCT 42.3 01/03/2020    MCV 96.1 01/03/2020    RDW 13.2 01/03/2020     01/03/2020       CMP:   Lab Results   Component Value Date     01/03/2020    K 3.8 01/03/2020     01/03/2020    CO2 24 01/03/2020    BUN 10 01/03/2020    CREATININE 0.6 01/03/2020    GFRAA >60 01/03/2020    LABGLOM >60 01/03/2020    GLUCOSE 97 01/03/2020    GLUCOSE 88 12/08/2010    PROT 7.9 02/04/2018    CALCIUM 9.4 01/03/2020    BILITOT 0.5 02/04/2018    ALKPHOS 102 02/04/2018    AST 24 09/20/2018    ALT 19 09/20/2018       POC Tests: No results for input(s): POCGLU, POCNA, POCK, POCCL, POCBUN, POCHEMO, POCHCT in the last 72 hours. Coags: No results found for: PROTIME, INR, APTT    HCG (If Applicable): No results found for: PREGTESTUR, PREGSERUM, HCG, HCGQUANT     ABGs: No results found for: PHART, PO2ART, ULS0WXB, WMD9BZH, BEART, I3YJXHPB     Type & Screen (If Applicable):  No results found for: LENNY [facility]    Anesthesia Evaluation  Patient summary reviewed history of anesthetic complications (Prolonged Emergence.):   Airway:         Dental:          Pulmonary:Negative Pulmonary ROS                              Cardiovascular:Negative CV ROS          ECG reviewed                     ROS comment: EKG: Normal Sinus Rhythm 69.      Neuro/Psych:   Negative Neuro/Psych ROS              GI/Hepatic/Renal: Neg

## 2020-01-29 NOTE — ANESTHESIA POSTPROCEDURE EVALUATION
Department of Anesthesiology  Postprocedure Note    Patient: Chase Mendoza  MRN: [id number]  YOB: 1970  Date of evaluation: 1/29/2020  Time:  1:20 PM     Procedure Summary     Date:  01/29/20 Room / Location:  [facility] 01 / [facility]    Anesthesia Start:  1237 Anesthesia Stop:  1314    Procedures:       EGD BIOPSY (N/A )      COLORECTAL CANCER SCREENING, HIGH RISK (N/A ) Diagnosis:  (GERD, HX OF POLYPS)    Surgeon:  Cristopher Olivarez MD Responsible Provider:  Idalia Birmingham MD    Anesthesia Type:  MAC ASA Status:  2          Anesthesia Type: MAC    Milton Phase I: Milton Score: 10    Milton Phase II:      Last vitals: Reviewed and per EMR flowsheets.        Anesthesia Post Evaluation    Patient location during evaluation: PACU  Patient participation: complete - patient participated  Level of consciousness: awake  Pain score: 0  Airway patency: patent  Nausea & Vomiting: no nausea  Complications: no  Cardiovascular status: blood pressure returned to baseline  Respiratory status: acceptable  Hydration status: euvolemic

## 2020-01-29 NOTE — OP NOTE
[facility]  ENDOSCOPY LAB  UPPER ENDOSCOPY REPORT      DATE OF PROCEDURE: 1/29/2020     PREOPERATIVE DIAGNOSIS: h/o RYGB, atypical chest pain    POSTOPERATIVE DIAGNOSIS/FINDINGS: gastric pouch polyps    SURGEON: Steven Mahmood MD    ASSISTANT: non    OPERATION: Esophagogastroduodenoscopy with biopsies    ANESTHESIA: Local monitored anesthesia. COMPLICATIONS: None. EBL:  5cc    PRIOR TO EXAM: Gen: comfortable, no distress, awake and alert; Lungs: Clear;  Heart:regular rate and rhythm, normal S1S2     BRIEF HISTORY:  This is a 52 y.o. female who presents with the complaint of atypical chest pain. My recommendation is to proceed with esophagogastroduodenoscopy. The patient was advised of the risks, benefits, complications and options including the risk of bleeding and perforation. The patient understood and agreed to proceed. PROCEDURE:  Under [facility] anesthesia, the patient was positioned in the left side down decubitus position. A bite block was inserted. Under direct visualization the scope was passed through the oral cavity, into the esophagus and then into the gastric pouch. The small bowel was normal  The gastric pouch was had 2 benign appearing polyps ~5mm, these were biopsied.   GEJ and esophagus were normal    THE PATIENT TOLERATED THE PROCEDURE WELL      SPECIMENS:  Gastric pouch biopsies    PLAN:  F/u biopsy results      Steven Mahmood MD  1/29/2020  1:15 PM

## 2020-02-04 ENCOUNTER — TELEPHONE (OUTPATIENT)
Dept: SURGERY | Age: 50
End: 2020-02-04

## 2020-09-30 ENCOUNTER — OFFICE VISIT (OUTPATIENT)
Dept: ENDOCRINOLOGY | Age: 50
End: 2020-09-30
Payer: COMMERCIAL

## 2020-09-30 VITALS
HEART RATE: 82 BPM | OXYGEN SATURATION: 93 % | BODY MASS INDEX: 44.65 KG/M2 | DIASTOLIC BLOOD PRESSURE: 76 MMHG | SYSTOLIC BLOOD PRESSURE: 132 MMHG | TEMPERATURE: 98.4 F | WEIGHT: 252 LBS | RESPIRATION RATE: 16 BRPM | HEIGHT: 63 IN

## 2020-09-30 PROCEDURE — 99204 OFFICE O/P NEW MOD 45 MIN: CPT | Performed by: INTERNAL MEDICINE

## 2020-09-30 RX ORDER — LEVOTHYROXINE SODIUM 112 UG/ML
112 SOLUTION ORAL EVERY MORNING
Qty: 30 ML | Refills: 3 | Status: SHIPPED
Start: 2020-09-30 | End: 2020-10-12 | Stop reason: SDUPTHER

## 2020-09-30 RX ORDER — LEVOTHYROXINE SODIUM 125 UG/1
CAPSULE ORAL DAILY
COMMUNITY
Start: 2020-09-18 | End: 2020-09-30

## 2020-09-30 RX ORDER — LORAZEPAM 0.5 MG/1
TABLET ORAL
COMMUNITY

## 2020-09-30 RX ORDER — OMEPRAZOLE 20 MG/1
20 CAPSULE, DELAYED RELEASE ORAL DAILY
Status: ON HOLD | COMMUNITY
End: 2022-03-21

## 2020-09-30 NOTE — PROGRESS NOTES
[address] [place] [address] Department of Endocrinology Diabetes and Metabolism   [address] [facility] [address]   Phone: [phone]  Fax: [phone]    Date of Service: 2020    Primary Care Physician: Linyd Hernandez DO  Referring physician: No ref. provider found  Provider: Marilynn Tompkins MD        Reason for the visit:  Primary Hypothyroidism    History of Present Illness: The history is provided by the patient. No  was used. Accuracy of the patient data is excellent. Ivy Parisi is a very pleasant 48 y.o. female seen today for management of hypothyroidism     The patent was diagnosed with hypothyroidism in her early 's. She is currently on Tirosint 125 mcg daily. Patient takes Tirosint in the morning at empty stomach, wait one hour before eating , avoid multivitamins containing calcium  or iron with it. She wasn't able to tolerate Levothyroxine, name brand synthroid    I have reviewed all previous labs and over the past 3 years TSH was consistently suppressed     Patient reported intermittent palpitations, feeling jittery, unexplained weight gain, fatigue and depressed mood. She had gastric bypass surgery long time ago but unfortunately regained most of her wt back     She reported remote h/o thyroid nodules. No recent thyroid [address] denies any new lumps, bumps in the neck, voice change, or shortness of breath. No family history of thyroid cancer. No prior history of radiation to head or neck region.     PAST MEDICAL HISTORY   Past Medical History:   Diagnosis Date    Prolonged emergence from general anesthesia     SENSITIVE TO MEDS, TAKES LESS TO PUT HER UNDER, DIFFICULTY WAKING, STOPS BREATHING PER PATIENT    Thyroid disease        PAST SURGICAL HISTORY   Past Surgical History:   Procedure Laterality Date    ABDOMEN SURGERY       SECTION      x2    CHOLECYSTECTOMY      COLONOSCOPY N/A 2020    COLORECTAL CANCER SCREENING, HIGH RISK performed by Caridad Cortes MD at [facility]      TUBAL LIGATION      UPPER GASTROINTESTINAL ENDOSCOPY N/A 1/29/2020    EGD BIOPSY performed by Caridad Cortes MD at [address]   Tobacco:   reports that she has never smoked. She has never used smokeless tobacco.  Alcohol:   reports no history of alcohol use. Drugs:   reports no history of drug use. FAMILY HISTORY   No family history on file. ALLERGIES AND DRUG REACTIONS   Allergies   Allergen Reactions    Avelox [Moxifloxacin Hcl In Nacl]     Cephalexin     Ciprofloxacin     Magnesium Sulfate     Penicillins     Percocet [Oxycodone-Acetaminophen]     Sulfa Antibiotics        CURRENT MEDICATIONS   Current Outpatient Medications   Medication Sig Dispense Refill    omeprazole (PRILOSEC) 20 MG delayed release capsule Take 20 mg by mouth daily      TIROSINT 125 MCG CAPS daily      LORazepam (ATIVAN) 0.5 MG tablet lorazepam 0.5 mg tablet       No current facility-administered medications for this visit. Review of Systems  Constitutional: No fever, no chills, no diaphoresis, no generalized weakness. HEENT: No blurred vision, No sore throat, no ear pain, no hair loss  Neck: denied any neck swelling, difficulty swallowing,   Cadrdiopulomary: No CP, SOB or palpitation, No orthopnea or PND. No cough or wheezing. GI: No N/V/D, no constipation, No abdominal pain, no melena or hematochezia   : Denied any dysuria, hematuria, flank pain, discharge, or incontinence. Skin: denied any rash, ulcer, Hirsute, or hyperpigmentation. MSK: denied any joint deformity, joint pain/swelling, muscle pain, or back pain.   Neuro: no numbess, no tingling, no weakness,     OBJECTIVE    /72   Pulse 82   Temp 98.4 °F (36.9 °C)   Resp 16   Ht 5' 3\" (1.6 m)   Wt 252 lb (114.3 kg)   SpO2 93%   BMI 44.64 kg/m²   BP Readings from Last 4 Encounters:   09/30/20 128/72   01/29/20 124/74   01/29/20 (!) 93/59   01/03/20 (!) 126/58     Wt Readings from Last 6 Encounters:   09/30/20 252 lb (114.3 kg)   01/29/20 247 lb (112 kg)   01/02/20 247 lb (112 kg)   07/26/19 252 lb (114.3 kg)   02/04/18 240 lb (108.9 kg)       Physical examination:  General: awake alert, oriented x3, no abnormal position or movements. HEENT: normocephalic non traumatic  Neck: supple, no LN enlargement, no thyroid tenderness, no JVD. Pulm: Clear equal air entry no added sounds, no wheezing or rhonchi    CVS: S1 + S2, no murmur, no heave. Dorsalis pedis pulse palpable   Abd: soft lax, no tenderness, no organomegaly, audible bowel sounds. Skin: warm, no lesions, no rash.   Musculoskeletal: No back tenderness, no kyphosis/scoliosis   Neuro: CN intact, sensation normal , muscle power normal.  Psych: normal mood, and affect    Review of Laboratory Data:  I have reviewed the following:  Lab Results   Component Value Date/Time    WBC 3.9 (L) 01/03/2020 08:11 AM    RBC 4.40 01/03/2020 08:11 AM    HGB 13.5 01/03/2020 08:11 AM    HCT 42.3 01/03/2020 08:11 AM    MCV 96.1 01/03/2020 08:11 AM    MCH 30.7 01/03/2020 08:11 AM    MCHC 31.9 (L) 01/03/2020 08:11 AM    RDW 13.2 01/03/2020 08:11 AM     01/03/2020 08:11 AM    MPV 10.1 01/03/2020 08:11 AM      Lab Results   Component Value Date/Time     01/03/2020 02:24 AM    K 3.8 01/03/2020 02:24 AM    CO2 24 01/03/2020 02:24 AM    BUN 10 01/03/2020 02:24 AM    CREATININE 0.6 01/03/2020 02:24 AM    CALCIUM 9.4 01/03/2020 02:24 AM    LABGLOM >60 01/03/2020 02:24 AM    GFRAA >60 01/03/2020 02:24 AM      Lab Results   Component Value Date/Time    TSH 0.502 01/03/2020 02:24 AM    T4FREE 1.47 09/20/2018 09:22 AM     Lab Results   Component Value Date    GLUCOSE 97 01/03/2020    GLUCOSE 88 12/08/2010     Lab Results   Component Value Date    TRIG 85 09/20/2018    HDL 63 01/03/2020    LDLCALC 112 01/03/2020    CHOL 169 09/20/2018     Lab Results   Component Value Date    VITD25 21 09/20/2018    VITD25 34 09/24/2016       Medical Records/Labs/Images Review:   I personally reviewed and summarized previous records   All labs and imaging were reviewed independently    Joaquin Funez 32, a 48 y.o.-old female seen in for following issues     Primary hypothyroidism   · Change Tirosint to 112 mcg daily   · Recheck labs in 6-8 weeks   · Discussed the risk of cardiac arrhythmia and loss of bone density with over treating hypothyroidism   · Pt was seen by an endocrinologist at [facility] and per pt there was a concern about pituitary disorder. I have reviewed all previous labs and didn't see any abnormal Pituitary results   · Will obtain pituitary hormones     VitD deficiency  · Continue vitD supplement   · Check level      H/o thyroid nodules   · Pt will being us her previous US DVD to review     Return in about 4 months (around 1/30/2021) for Hypothyroidism . The above issues were reviewed with the patient who understood and agreed with the plan. Thank you for allowing us to participate in the care of this patient. Please do not hesitate to contact us with any additional questions. Diagnosis Orders   1. Hypothyroidism, unspecified type  T4, Free    T4    TSH without Reflex    Thyroid AB    TIROSINT- MCG/ML SOLN   2. Pituitary disorder (HCC)  T4, Free    T4    TSH without Reflex    Follicle Stimulating Hormone    Insulin-Like Growth Factor    Estradiol    Cortisol Total    Comprehensive Metabolic Panel    Prolactin    Luteinizing Hormone   3. H/O thyroid nodule     4. Abnormal thyroid function test  THYROID STIMULATING IMMUNOGLOBULIN       Yo Viera MD  Endocrinologist, [facility]   [address] [place] [address]   Phone: [phone]  Fax: [phone]  ------------------------------  An electronic signature was used to authenticate this note.  Seun Marte MD on 9/30/2020 at 2:51

## 2020-10-02 PROBLEM — Z86.39 H/O THYROID NODULE: Status: ACTIVE | Noted: 2020-10-02

## 2020-10-02 PROBLEM — E23.7 PITUITARY DISORDER (HCC): Status: ACTIVE | Noted: 2020-10-02

## 2020-10-02 PROBLEM — E03.9 HYPOTHYROIDISM: Status: ACTIVE | Noted: 2020-10-02

## 2020-10-03 ENCOUNTER — HOSPITAL ENCOUNTER (OUTPATIENT)
Age: 50
Discharge: HOME | End: 2020-10-03
Payer: COMMERCIAL

## 2020-10-03 LAB
ALBUMIN SERPL-MCNC: 4.1 G/DL (ref 3.5–5.2)
ALP BLD-CCNC: 97 U/L (ref 35–104)
ALT SERPL-CCNC: 12 U/L (ref 0–32)
ANION GAP SERPL CALCULATED.3IONS-SCNC: 9 MMOL/L (ref 7–16)
AST SERPL-CCNC: 18 U/L (ref 0–31)
BILIRUB SERPL-MCNC: 0.5 MG/DL (ref 0–1.2)
BUN BLDV-MCNC: 13 MG/DL (ref 6–20)
CALCIUM SERPL-MCNC: 9.5 MG/DL (ref 8.6–10.2)
CHLORIDE BLD-SCNC: 103 MMOL/L (ref 98–107)
CO2: 28 MMOL/L (ref 22–29)
CREAT SERPL-MCNC: 0.7 MG/DL (ref 0.5–1)
ESTRADIOL LEVEL: 10.8 PG/ML
FOLLICLE STIMULATING HORMONE: 120.8 MIU/ML
GFR AFRICAN AMERICAN: >60
GFR NON-AFRICAN AMERICAN: >60 ML/MIN/1.73
GLUCOSE BLD-MCNC: 92 MG/DL (ref 74–99)
LUTEINIZING HORMONE: 58.3 MIU/ML
POTASSIUM SERPL-SCNC: 4.1 MMOL/L (ref 3.5–5)
PROLACTIN: 12.47 NG/ML
SODIUM BLD-SCNC: 140 MMOL/L (ref 132–146)
TOTAL PROTEIN: 7.5 G/DL (ref 6.4–8.3)
TSH SERPL DL<=0.05 MIU/L-ACNC: 0.49 UIU/ML (ref 0.27–4.2)

## 2020-10-03 PROCEDURE — 84445 ASSAY OF TSI GLOBULIN: CPT

## 2020-10-03 PROCEDURE — 83002 ASSAY OF GONADOTROPIN (LH): CPT

## 2020-10-03 PROCEDURE — 86800 THYROGLOBULIN ANTIBODY: CPT

## 2020-10-03 PROCEDURE — 80053 COMPREHEN METABOLIC PANEL: CPT

## 2020-10-03 PROCEDURE — 36415 COLL VENOUS BLD VENIPUNCTURE: CPT

## 2020-10-03 PROCEDURE — 83001 ASSAY OF GONADOTROPIN (FSH): CPT

## 2020-10-03 PROCEDURE — 84305 ASSAY OF SOMATOMEDIN: CPT

## 2020-10-03 PROCEDURE — 84439 ASSAY OF FREE THYROXINE: CPT

## 2020-10-03 PROCEDURE — 82533 TOTAL CORTISOL: CPT

## 2020-10-03 PROCEDURE — 86376 MICROSOMAL ANTIBODY EACH: CPT

## 2020-10-03 PROCEDURE — 84146 ASSAY OF PROLACTIN: CPT

## 2020-10-03 PROCEDURE — 82670 ASSAY OF TOTAL ESTRADIOL: CPT

## 2020-10-03 PROCEDURE — 84443 ASSAY THYROID STIM HORMONE: CPT

## 2020-10-04 ENCOUNTER — TELEPHONE (OUTPATIENT)
Dept: ENDOCRINOLOGY | Age: 50
End: 2020-10-04

## 2020-10-04 LAB
CORTISOL TOTAL: 16.19 MCG/DL (ref 2.68–18.4)
T4 FREE: 1.3 NG/DL (ref 0.93–1.7)
T4 TOTAL: 8.4 MCG/DL (ref 4.5–11.7)

## 2020-10-07 LAB
THYROGLOBULIN AB: <0.9 IU/ML (ref 0–4)
THYROID PEROXIDASE (TPO) ABS: 2.6 IU/ML (ref 0–9)

## 2020-10-08 ENCOUNTER — TELEPHONE (OUTPATIENT)
Dept: ENDOCRINOLOGY | Age: 50
End: 2020-10-08

## 2020-10-08 LAB
IGF-1 (INSULIN-LIKE GROWTH I): 120 NG/ML (ref 57–236)
INSULIN-LIKE GROWTH FACTOR-1 Z-SCORE: -0.1
THYROID STIMULATING IMMUNOGLOBULIN: <0.1 IU/L

## 2020-10-08 NOTE — TELEPHONE ENCOUNTER
Patient has concerns regarding her tirosint PA. She is waiting for approval for liquid Tirosint. Since she has not nelsy able to get the dose she needs she has been taking previous dose which is higher than recommended and is not feeling well.   Please advise

## 2020-10-09 ENCOUNTER — TELEPHONE (OUTPATIENT)
Dept: ENDOCRINOLOGY | Age: 50
End: 2020-10-09

## 2020-10-09 NOTE — TELEPHONE ENCOUNTER
Tim Wakefield,   Please check what happen with Tirosin liquid PA    She is not able to tolerate capsules forms

## 2020-10-09 NOTE — TELEPHONE ENCOUNTER
Notify pt,  I have reviewed your recent results    Great! All antibodies are negative.      This confirm what I told you, you have hypothyroidism and previous dose was high

## 2020-10-09 NOTE — TELEPHONE ENCOUNTER
Called pt to inform, expressed verbal understanding. Also informed pt tirosint was approved. Sent to pharmacy.

## 2020-10-12 RX ORDER — LEVOTHYROXINE SODIUM 112 UG/ML
112 SOLUTION ORAL EVERY MORNING
Qty: 30 ML | Refills: 5 | Status: SHIPPED
Start: 2020-10-12 | End: 2020-12-10

## 2020-10-16 ENCOUNTER — TELEPHONE (OUTPATIENT)
Dept: ENDOCRINOLOGY | Age: 50
End: 2020-10-16

## 2020-10-16 NOTE — TELEPHONE ENCOUNTER
The pt has been on the liquid Tirosint 112mcg for 2 days. She states that she now has jaw pain, ear pressure, and head pain. Please advise.

## 2020-12-02 ENCOUNTER — HOSPITAL ENCOUNTER (OUTPATIENT)
Age: 50
Discharge: HOME | End: 2020-12-02
Payer: COMMERCIAL

## 2020-12-02 LAB
T4 FREE: 1.24 NG/DL (ref 0.93–1.7)
TSH SERPL DL<=0.05 MIU/L-ACNC: 5.64 UIU/ML (ref 0.27–4.2)

## 2020-12-02 PROCEDURE — 84443 ASSAY THYROID STIM HORMONE: CPT

## 2020-12-02 PROCEDURE — 84439 ASSAY OF FREE THYROXINE: CPT

## 2020-12-02 PROCEDURE — 36415 COLL VENOUS BLD VENIPUNCTURE: CPT

## 2020-12-10 RX ORDER — LEVOTHYROXINE SODIUM 112 UG/1
CAPSULE ORAL
Qty: 90 CAPSULE | Refills: 3 | Status: SHIPPED
Start: 2020-12-10 | End: 2021-06-02

## 2020-12-22 ENCOUNTER — OFFICE VISIT (OUTPATIENT)
Dept: ENDOCRINOLOGY | Age: 50
End: 2020-12-22
Payer: COMMERCIAL

## 2020-12-22 ENCOUNTER — TELEPHONE (OUTPATIENT)
Dept: ENDOCRINOLOGY | Age: 50
End: 2020-12-22

## 2020-12-22 VITALS
TEMPERATURE: 96.8 F | WEIGHT: 255 LBS | OXYGEN SATURATION: 95 % | DIASTOLIC BLOOD PRESSURE: 72 MMHG | BODY MASS INDEX: 45.17 KG/M2 | HEART RATE: 73 BPM | SYSTOLIC BLOOD PRESSURE: 124 MMHG

## 2020-12-22 PROCEDURE — 99214 OFFICE O/P EST MOD 30 MIN: CPT | Performed by: INTERNAL MEDICINE

## 2020-12-22 NOTE — TELEPHONE ENCOUNTER
Notify pt,  I was able to open your thyroid US dvd. Both Thyroid US didn't show any discrete nodules and calcification seen on US looks to be an atrophic calcification    Will repeat thyroid US in 1/2021.  Ok to do US at [facility]

## 2020-12-22 NOTE — PROGRESS NOTES
[address] [place] [address] Department of Endocrinology Diabetes and Metabolism   [address] [facility] [address]   Phone: [phone]  Fax: [phone]    Date of Service: 12/22/2020    Primary Care Physician: Ariel Casarez DO  Referring physician: No ref. provider found  Provider: Melina Hodgkins MD        Reason for the visit:  Primary Hypothyroidism    History of Present Illness: The history is provided by the patient. No  was used. Accuracy of the patient data is excellent. Alex Reyes is a very pleasant 48 y.o. female seen today for management of hypothyroidism     The patent was diagnosed with hypothyroidism in her early 19's. She is currently on Tirosint 112 mcg daily. Patient takes Tirosint in the morning at empty stomach, wait one hour before eating , avoid multivitamins containing calcium  or iron with it. Lab Results   Component Value Date/Time    TSH 5.640 (H) 12/02/2020 11:38 AM    T4FREE 1.24 12/02/2020 11:38 AM     She wasn't able to tolerate Levothyroxine, name brand synthroid    Patient still c/o fatigue     She had gastric bypass surgery long time ago but unfortunately regained most of her wt back     MNG   Thyroid US 10/2019   Atrophic heterogenous  thyroid gland   Rt lobe measures 2.6 x 0.7 x 0.7 cm, Lt lobe measures 2.1 x \0.8 x 0.5 cm, isthmus measures 0.1 cm   Non specific calcification in interpolar region measures 0.2 cm      Alex Reyes reported some discomfort at thyroid level but denies any voice change, or shortness of breath. No family history of thyroid cancer. No prior history of radiation to head or neck region.     PAST MEDICAL HISTORY   Past Medical History:   Diagnosis Date    Prolonged emergence from general anesthesia     SENSITIVE TO MEDS, TAKES LESS TO PUT HER UNDER, DIFFICULTY WAKING, STOPS BREATHING PER PATIENT    Thyroid disease        PAST SURGICAL HISTORY   Past Surgical History:   Procedure Laterality Date    ABDOMEN SURGERY       SECTION      x2    CHOLECYSTECTOMY      COLONOSCOPY N/A 2020    COLORECTAL CANCER SCREENING, HIGH RISK performed by Natasha Perry MD at [facility]      TUBAL LIGATION      UPPER GASTROINTESTINAL ENDOSCOPY N/A 2020    EGD BIOPSY performed by Natasha Perry MD at [facility] [address]   Tobacco:   reports that she has never smoked. She has never used smokeless tobacco.  Alcohol:   reports no history of alcohol use. Drugs:   reports no history of drug use. FAMILY HISTORY   No family history on file. ALLERGIES AND DRUG REACTIONS   Allergies   Allergen Reactions    Avelox [Moxifloxacin Hcl In Nacl]     Cephalexin     Ciprofloxacin     Magnesium Sulfate     Penicillins     Percocet [Oxycodone-Acetaminophen]     Sulfa Antibiotics        CURRENT MEDICATIONS   Current Outpatient Medications   Medication Sig Dispense Refill    Levothyroxine Sodium (TIROSINT) 112 MCG CAPS One tab by mouth daily 90 capsule 3    clobetasol (TEMOVATE) 0.05 % ointment Apply topically 2 times daily. 60 g 0    estradiol (ESTRACE VAGINAL) 0.1 MG/GM vaginal cream Apply pea size amount vaginally 3 times a week at night time 60 g 3    estradiol (ESTRACE) 0.1 MG/GM vaginal cream Apply pea size amount vaginally 2- 3 times a week at night time 1 Tube 0    omeprazole (PRILOSEC) 20 MG delayed release capsule Take 20 mg by mouth daily      LORazepam (ATIVAN) 0.5 MG tablet lorazepam 0.5 mg tablet       No current facility-administered medications for this visit. Review of Systems  Constitutional: No fever, no chills, no diaphoresis, no generalized weakness. HEENT: No blurred vision, No sore throat, no ear pain, no hair loss  Neck: denied any neck swelling, difficulty swallowing,   Cadrdiopulomary: No CP, SOB or palpitation, No orthopnea or PND. No cough or wheezing.   GI: No N/V/D, no constipation, No abdominal pain, no melena or hematochezia   : Denied any dysuria, hematuria, flank pain, discharge, or incontinence. Skin: denied any rash, ulcer, Hirsute, or hyperpigmentation. MSK: denied any joint deformity, joint pain/swelling, muscle pain, or back pain. Neuro: no numbess, no tingling, no weakness,     OBJECTIVE    /72   Pulse 73   Temp 96.8 °F (36 °C)   Wt 255 lb (115.7 kg)   SpO2 95%   BMI 45.17 kg/m²   BP Readings from Last 4 Encounters:   12/22/20 124/72   11/19/20 129/83   09/30/20 132/76   01/29/20 124/74     Wt Readings from Last 6 Encounters:   12/22/20 255 lb (115.7 kg)   11/19/20 256 lb (116.1 kg)   09/30/20 252 lb (114.3 kg)   01/29/20 247 lb (112 kg)   01/02/20 247 lb (112 kg)   07/26/19 252 lb (114.3 kg)       Physical examination:  General: awake alert, oriented x3, no abnormal position or movements. HEENT: normocephalic non traumatic  Neck: supple, no LN enlargement, no thyroid tenderness, no JVD. Pulm: Clear equal air entry no added sounds, no wheezing or rhonchi    CVS: S1 + S2, no murmur, no heave. Dorsalis pedis pulse palpable   Abd: soft lax, no tenderness, no organomegaly, audible bowel sounds. Skin: warm, no lesions, no rash.   Musculoskeletal: No back tenderness, no kyphosis/scoliosis   Neuro: CN intact, sensation normal , muscle power normal.  Psych: normal mood, and affect    Review of Laboratory Data:  I have reviewed the following:  Lab Results   Component Value Date/Time    WBC 3.9 (L) 01/03/2020 08:11 AM    RBC 4.40 01/03/2020 08:11 AM    HGB 13.5 01/03/2020 08:11 AM    HCT 42.3 01/03/2020 08:11 AM    MCV 96.1 01/03/2020 08:11 AM    MCH 30.7 01/03/2020 08:11 AM    MCHC 31.9 (L) 01/03/2020 08:11 AM    RDW 13.2 01/03/2020 08:11 AM     01/03/2020 08:11 AM    MPV 10.1 01/03/2020 08:11 AM      Lab Results   Component Value Date/Time     10/03/2020 08:12 AM    K 4.1 10/03/2020 08:12 AM    CO2 28 10/03/2020 08:12 AM    BUN 13 10/03/2020 08:12 AM questions. Diagnosis Orders   1. Hypothyroidism, unspecified type  TSH without Reflex    T4, Free       Servando Deluca MD  Endocrinologist, [facility] Endocrinology   [address] [facility], [address] [place] [address],[facility] [id number]   Phone: [phone]  Fax: [phone]  ------------------------------  An electronic signature was used to authenticate this note.  Dony Briseno MD on 12/22/2020 at 11:03 AM

## 2020-12-31 ENCOUNTER — HOSPITAL ENCOUNTER (OUTPATIENT)
Dept: ULTRASOUND IMAGING | Age: 50
Discharge: HOME | End: 2021-01-02
Payer: COMMERCIAL

## 2020-12-31 DIAGNOSIS — E04.2 MULTINODULAR GOITER: ICD-10-CM

## 2020-12-31 DIAGNOSIS — E03.9 HYPOTHYROIDISM, UNSPECIFIED TYPE: ICD-10-CM

## 2020-12-31 PROCEDURE — 76536 US EXAM OF HEAD AND NECK: CPT

## 2021-01-03 ENCOUNTER — TELEPHONE (OUTPATIENT)
Dept: ENDOCRINOLOGY | Age: 51
End: 2021-01-03

## 2021-01-22 DIAGNOSIS — E23.7 PITUITARY DISORDER (HCC): Primary | ICD-10-CM

## 2021-01-22 DIAGNOSIS — E04.2 MULTINODULAR GOITER: ICD-10-CM

## 2021-01-22 DIAGNOSIS — E03.9 HYPOTHYROIDISM, UNSPECIFIED TYPE: ICD-10-CM

## 2021-01-22 DIAGNOSIS — R94.6 ABNORMAL THYROID FUNCTION TEST: ICD-10-CM

## 2021-01-22 DIAGNOSIS — Z86.39 H/O THYROID NODULE: ICD-10-CM

## 2021-01-22 DIAGNOSIS — Z98.84 HX OF GASTRIC BYPASS: ICD-10-CM

## 2021-01-22 LAB
T4 FREE: 1.6
TSH SERPL DL<=0.05 MIU/L-ACNC: 2.36 UIU/ML

## 2021-01-27 DIAGNOSIS — E04.2 MULTINODULAR GOITER: ICD-10-CM

## 2021-01-27 DIAGNOSIS — R94.6 ABNORMAL THYROID FUNCTION TEST: ICD-10-CM

## 2021-01-27 DIAGNOSIS — Z86.39 H/O THYROID NODULE: ICD-10-CM

## 2021-01-27 DIAGNOSIS — E23.7 PITUITARY DISORDER (HCC): ICD-10-CM

## 2021-01-27 DIAGNOSIS — Z98.84 HX OF GASTRIC BYPASS: ICD-10-CM

## 2021-01-27 DIAGNOSIS — E03.9 HYPOTHYROIDISM, UNSPECIFIED TYPE: ICD-10-CM

## 2021-01-29 ENCOUNTER — TELEPHONE (OUTPATIENT)
Dept: ENDOCRINOLOGY | Age: 51
End: 2021-01-29

## 2021-01-29 DIAGNOSIS — E03.9 HYPOTHYROIDISM, UNSPECIFIED TYPE: Primary | ICD-10-CM

## 2021-01-29 NOTE — TELEPHONE ENCOUNTER
Notify pt,  I have reviewed your recent results    Great!, thyroid hormones results are within an acceptable range of normal. There is no need for a change in your therapy at this time.     Repeat labs before next visit

## 2021-05-28 ENCOUNTER — HOSPITAL ENCOUNTER (OUTPATIENT)
Age: 51
Discharge: HOME | End: 2021-05-28
Payer: COMMERCIAL

## 2021-05-28 LAB
ALT SERPL-CCNC: 14 U/L (ref 0–32)
ANION GAP SERPL CALCULATED.3IONS-SCNC: 11 MMOL/L (ref 7–16)
AST SERPL-CCNC: 20 U/L (ref 0–31)
BASOPHILS ABSOLUTE: 0.06 E9/L (ref 0–0.2)
BASOPHILS RELATIVE PERCENT: 1.1 % (ref 0–2)
BUN BLDV-MCNC: 8 MG/DL (ref 6–20)
C-REACTIVE PROTEIN: 0.5 MG/DL (ref 0–0.4)
CALCIUM SERPL-MCNC: 9.7 MG/DL (ref 8.6–10.2)
CHLORIDE BLD-SCNC: 99 MMOL/L (ref 98–107)
CHOLESTEROL, TOTAL: 206 MG/DL (ref 0–199)
CO2: 28 MMOL/L (ref 22–29)
CREAT SERPL-MCNC: 0.7 MG/DL (ref 0.5–1)
EOSINOPHILS ABSOLUTE: 0.18 E9/L (ref 0.05–0.5)
EOSINOPHILS RELATIVE PERCENT: 3.4 % (ref 0–6)
GFR AFRICAN AMERICAN: >60
GFR NON-AFRICAN AMERICAN: >60 ML/MIN/1.73
GLUCOSE BLD-MCNC: 97 MG/DL (ref 74–99)
HCT VFR BLD CALC: 43.6 % (ref 34–48)
HDLC SERPL-MCNC: 83 MG/DL
HEMOGLOBIN: 13.9 G/DL (ref 11.5–15.5)
IMMATURE GRANULOCYTES #: 0.02 E9/L
IMMATURE GRANULOCYTES %: 0.4 % (ref 0–5)
LDL CHOLESTEROL CALCULATED: 106 MG/DL (ref 0–99)
LYMPHOCYTES ABSOLUTE: 0.89 E9/L (ref 1.5–4)
LYMPHOCYTES RELATIVE PERCENT: 16.8 % (ref 20–42)
MAGNESIUM: 2.1 MG/DL (ref 1.6–2.6)
MCH RBC QN AUTO: 30.6 PG (ref 26–35)
MCHC RBC AUTO-ENTMCNC: 31.9 % (ref 32–34.5)
MCV RBC AUTO: 96 FL (ref 80–99.9)
MONOCYTES ABSOLUTE: 0.36 E9/L (ref 0.1–0.95)
MONOCYTES RELATIVE PERCENT: 6.8 % (ref 2–12)
NEUTROPHILS ABSOLUTE: 3.78 E9/L (ref 1.8–7.3)
NEUTROPHILS RELATIVE PERCENT: 71.5 % (ref 43–80)
PDW BLD-RTO: 13.6 FL (ref 11.5–15)
PLATELET # BLD: 285 E9/L (ref 130–450)
PMV BLD AUTO: 10.5 FL (ref 7–12)
POTASSIUM SERPL-SCNC: 3.9 MMOL/L (ref 3.5–5)
RBC # BLD: 4.54 E12/L (ref 3.5–5.5)
RHEUMATOID FACTOR: <10 IU/ML (ref 0–13)
SEDIMENTATION RATE, ERYTHROCYTE: 18 MM/HR (ref 0–20)
SODIUM BLD-SCNC: 138 MMOL/L (ref 132–146)
T4 FREE: 1.59 NG/DL (ref 0.93–1.7)
TOTAL CK: 65 U/L (ref 20–180)
TRIGL SERPL-MCNC: 83 MG/DL (ref 0–149)
TSH SERPL DL<=0.05 MIU/L-ACNC: 2.93 UIU/ML (ref 0.27–4.2)
URIC ACID, SERUM: 5.8 MG/DL (ref 2.4–5.7)
VITAMIN D 25-HYDROXY: 32 NG/ML (ref 30–100)
VLDLC SERPL CALC-MCNC: 17 MG/DL
WBC # BLD: 5.3 E9/L (ref 4.5–11.5)

## 2021-05-28 PROCEDURE — 84439 ASSAY OF FREE THYROXINE: CPT

## 2021-05-28 PROCEDURE — 86140 C-REACTIVE PROTEIN: CPT

## 2021-05-28 PROCEDURE — 85025 COMPLETE CBC W/AUTO DIFF WBC: CPT

## 2021-05-28 PROCEDURE — 85651 RBC SED RATE NONAUTOMATED: CPT

## 2021-05-28 PROCEDURE — 82306 VITAMIN D 25 HYDROXY: CPT

## 2021-05-28 PROCEDURE — 84550 ASSAY OF BLOOD/URIC ACID: CPT

## 2021-05-28 PROCEDURE — 86431 RHEUMATOID FACTOR QUANT: CPT

## 2021-05-28 PROCEDURE — 83735 ASSAY OF MAGNESIUM: CPT

## 2021-05-28 PROCEDURE — 80048 BASIC METABOLIC PNL TOTAL CA: CPT

## 2021-05-28 PROCEDURE — 86038 ANTINUCLEAR ANTIBODIES: CPT

## 2021-05-28 PROCEDURE — 84443 ASSAY THYROID STIM HORMONE: CPT

## 2021-05-28 PROCEDURE — 81374 HLA I TYPING 1 ANTIGEN LR: CPT

## 2021-05-28 PROCEDURE — 84450 TRANSFERASE (AST) (SGOT): CPT

## 2021-05-28 PROCEDURE — 36415 COLL VENOUS BLD VENIPUNCTURE: CPT

## 2021-05-28 PROCEDURE — 86200 CCP ANTIBODY: CPT

## 2021-05-28 PROCEDURE — 80061 LIPID PANEL: CPT

## 2021-05-28 PROCEDURE — 84460 ALANINE AMINO (ALT) (SGPT): CPT

## 2021-05-28 PROCEDURE — 82550 ASSAY OF CK (CPK): CPT

## 2021-05-31 ENCOUNTER — APPOINTMENT (OUTPATIENT)
Dept: GENERAL RADIOLOGY | Age: 51
End: 2021-05-31
Payer: COMMERCIAL

## 2021-05-31 ENCOUNTER — HOSPITAL ENCOUNTER (EMERGENCY)
Age: 51
Discharge: HOME | End: 2021-05-31
Attending: EMERGENCY MEDICINE
Payer: COMMERCIAL

## 2021-05-31 VITALS
SYSTOLIC BLOOD PRESSURE: 142 MMHG | TEMPERATURE: 97.9 F | OXYGEN SATURATION: 100 % | HEART RATE: 61 BPM | RESPIRATION RATE: 24 BRPM | DIASTOLIC BLOOD PRESSURE: 88 MMHG

## 2021-05-31 DIAGNOSIS — I48.0 PAF (PAROXYSMAL ATRIAL FIBRILLATION) (HCC): ICD-10-CM

## 2021-05-31 DIAGNOSIS — I48.91 ATRIAL FIBRILLATION WITH RAPID VENTRICULAR RESPONSE (HCC): Primary | ICD-10-CM

## 2021-05-31 LAB
ALBUMIN SERPL-MCNC: 4.2 G/DL (ref 3.5–5.2)
ALP BLD-CCNC: 103 U/L (ref 35–104)
ALT SERPL-CCNC: 13 U/L (ref 0–32)
ANION GAP SERPL CALCULATED.3IONS-SCNC: 10 MMOL/L (ref 7–16)
APTT: 36.6 SEC (ref 24.5–35.1)
AST SERPL-CCNC: 23 U/L (ref 0–31)
BASOPHILS ABSOLUTE: 0.06 E9/L (ref 0–0.2)
BASOPHILS RELATIVE PERCENT: 1.2 % (ref 0–2)
BILIRUB SERPL-MCNC: 0.5 MG/DL (ref 0–1.2)
BUN BLDV-MCNC: 9 MG/DL (ref 6–20)
CALCIUM SERPL-MCNC: 9.3 MG/DL (ref 8.6–10.2)
CHLORIDE BLD-SCNC: 104 MMOL/L (ref 98–107)
CO2: 27 MMOL/L (ref 22–29)
CREAT SERPL-MCNC: 0.7 MG/DL (ref 0.5–1)
EKG ATRIAL RATE: 58 BPM
EKG Q-T INTERVAL: 322 MS
EKG QRS DURATION: 92 MS
EKG QTC CALCULATION (BAZETT): 435 MS
EKG R AXIS: -14 DEGREES
EKG T AXIS: 31 DEGREES
EKG VENTRICULAR RATE: 110 BPM
EOSINOPHILS ABSOLUTE: 0.27 E9/L (ref 0.05–0.5)
EOSINOPHILS RELATIVE PERCENT: 5.2 % (ref 0–6)
GFR AFRICAN AMERICAN: >60
GFR NON-AFRICAN AMERICAN: >60 ML/MIN/1.73
GLUCOSE BLD-MCNC: 104 MG/DL (ref 74–99)
HCT VFR BLD CALC: 46.6 % (ref 34–48)
HEMOGLOBIN: 14.3 G/DL (ref 11.5–15.5)
IMMATURE GRANULOCYTES #: 0.01 E9/L
IMMATURE GRANULOCYTES %: 0.2 % (ref 0–5)
INR BLD: 1
LYMPHOCYTES ABSOLUTE: 1.82 E9/L (ref 1.5–4)
LYMPHOCYTES RELATIVE PERCENT: 35.2 % (ref 20–42)
MAGNESIUM: 2.2 MG/DL (ref 1.6–2.6)
MCH RBC QN AUTO: 30.6 PG (ref 26–35)
MCHC RBC AUTO-ENTMCNC: 30.7 % (ref 32–34.5)
MCV RBC AUTO: 99.8 FL (ref 80–99.9)
MONOCYTES ABSOLUTE: 0.41 E9/L (ref 0.1–0.95)
MONOCYTES RELATIVE PERCENT: 7.9 % (ref 2–12)
NEUTROPHILS ABSOLUTE: 2.6 E9/L (ref 1.8–7.3)
NEUTROPHILS RELATIVE PERCENT: 50.3 % (ref 43–80)
PDW BLD-RTO: 13.9 FL (ref 11.5–15)
PLATELET # BLD: 115 E9/L (ref 130–450)
PMV BLD AUTO: 11.8 FL (ref 7–12)
POTASSIUM REFLEX MAGNESIUM: 4 MMOL/L (ref 3.5–5)
PRO-BNP: 312 PG/ML (ref 0–125)
PROTHROMBIN TIME: 11 SEC (ref 9.3–12.4)
RBC # BLD: 4.67 E12/L (ref 3.5–5.5)
REASON FOR REJECTION: NORMAL
REJECTED TEST: NORMAL
SODIUM BLD-SCNC: 141 MMOL/L (ref 132–146)
T4 FREE: 1.76 NG/DL (ref 0.93–1.7)
TOTAL PROTEIN: 7.5 G/DL (ref 6.4–8.3)
TROPONIN, HIGH SENSITIVITY: <6 NG/L (ref 0–9)
TSH SERPL DL<=0.05 MIU/L-ACNC: 1.86 UIU/ML (ref 0.27–4.2)
WBC # BLD: 5.2 E9/L (ref 4.5–11.5)

## 2021-05-31 PROCEDURE — 80053 COMPREHEN METABOLIC PANEL: CPT

## 2021-05-31 PROCEDURE — 93005 ELECTROCARDIOGRAM TRACING: CPT | Performed by: EMERGENCY MEDICINE

## 2021-05-31 PROCEDURE — 83880 ASSAY OF NATRIURETIC PEPTIDE: CPT

## 2021-05-31 PROCEDURE — 85730 THROMBOPLASTIN TIME PARTIAL: CPT

## 2021-05-31 PROCEDURE — 2580000003 HC RX 258: Performed by: EMERGENCY MEDICINE

## 2021-05-31 PROCEDURE — 2500000003 HC RX 250 WO HCPCS: Performed by: EMERGENCY MEDICINE

## 2021-05-31 PROCEDURE — 85025 COMPLETE CBC W/AUTO DIFF WBC: CPT

## 2021-05-31 PROCEDURE — 99283 EMERGENCY DEPT VISIT LOW MDM: CPT

## 2021-05-31 PROCEDURE — 93010 ELECTROCARDIOGRAM REPORT: CPT | Performed by: INTERNAL MEDICINE

## 2021-05-31 PROCEDURE — 84439 ASSAY OF FREE THYROXINE: CPT

## 2021-05-31 PROCEDURE — 84484 ASSAY OF TROPONIN QUANT: CPT

## 2021-05-31 PROCEDURE — 84443 ASSAY THYROID STIM HORMONE: CPT

## 2021-05-31 PROCEDURE — 36415 COLL VENOUS BLD VENIPUNCTURE: CPT

## 2021-05-31 PROCEDURE — 83735 ASSAY OF MAGNESIUM: CPT

## 2021-05-31 PROCEDURE — 71045 X-RAY EXAM CHEST 1 VIEW: CPT

## 2021-05-31 PROCEDURE — 85610 PROTHROMBIN TIME: CPT

## 2021-05-31 PROCEDURE — 96374 THER/PROPH/DIAG INJ IV PUSH: CPT

## 2021-05-31 RX ORDER — DILTIAZEM HYDROCHLORIDE 5 MG/ML
10 INJECTION INTRAVENOUS ONCE
Status: COMPLETED | OUTPATIENT
Start: 2021-05-31 | End: 2021-05-31

## 2021-05-31 RX ORDER — 0.9 % SODIUM CHLORIDE 0.9 %
500 INTRAVENOUS SOLUTION INTRAVENOUS ONCE
Status: COMPLETED | OUTPATIENT
Start: 2021-05-31 | End: 2021-05-31

## 2021-05-31 RX ORDER — ASPIRIN 325 MG
325 TABLET ORAL ONCE
Status: DISCONTINUED | OUTPATIENT
Start: 2021-05-31 | End: 2021-05-31 | Stop reason: HOSPADM

## 2021-05-31 RX ADMIN — DILTIAZEM HYDROCHLORIDE 10 MG: 5 INJECTION INTRAVENOUS at 10:29

## 2021-05-31 RX ADMIN — SODIUM CHLORIDE 500 ML: 9 INJECTION, SOLUTION INTRAVENOUS at 10:28

## 2021-05-31 NOTE — ED PROVIDER NOTES
Colonoscopy (N/A, 1/29/2020). Social History:  reports that she has never smoked. She has never used smokeless tobacco. She reports that she does not drink alcohol and does not use drugs. Family History: family history is not on file. . Unless otherwise noted, family history is non contributory    The patients home medications have been reviewed. Allergies: Avelox [moxifloxacin hcl in nacl], Cephalexin, Ciprofloxacin, Magnesium sulfate, Penicillins, Percocet [oxycodone-acetaminophen], and Sulfa antibiotics    I have reviewed the past medical history, past surgical history, social history, and family history    ---------------------------------------------------PHYSICAL EXAM--------------------------------------    Constitutional/General: Alert and oriented x3  Head: Normocephalic and atraumatic  Eyes: PERRL, EOMI, sclera non icteric  ENT: Oropharynx clear, handling secretions  Neck: Supple, full ROM, no stridor, no meningeal signs  Respiratory: Lungs clear to auscultation bilaterally, no wheezes, rales, or rhonchi. Not in respiratory distress  Cardiovascular: Irregularly irregular, tachycardic . No murmurs, no gallops, no rubs. 2+ distal pulses. Equal extremity pulses. Chest: No chest wall tenderness  Gastrointestinal:  Abdomen Soft, Non tender, Non distended. No rebound, guarding, or rigidity. No pulsatile masses. Musculoskeletal: Moves all extremities x 4. Warm and well perfused, no clubbing, no cyanosis, no edema. Capillary refill <3 seconds  Skin: skin warm and dry. No rashes. Neurologic: GCS 15, no focal deficits  Psychiatric: Normal Affect          -------------------------------------------------- RESULTS -------------------------------------------------  I have personally reviewed all laboratory and imaging results for this patient. Results are listed below.      LABS: (Lab results interpreted by me)  Results for orders placed or performed during the hospital encounter of 05/31/21   CBC Auto Differential   Result Value Ref Range    WBC 5.2 4.5 - 11.5 E9/L    RBC 4.67 3.50 - 5.50 E12/L    Hemoglobin 14.3 11.5 - 15.5 g/dL    Hematocrit 46.6 34.0 - 48.0 %    MCV 99.8 80.0 - 99.9 fL    MCH 30.6 26.0 - 35.0 pg    MCHC 30.7 (L) 32.0 - 34.5 %    RDW 13.9 11.5 - 15.0 fL    Platelets 651 (L) 285 - 450 E9/L    MPV 11.8 7.0 - 12.0 fL    Neutrophils % 50.3 43.0 - 80.0 %    Immature Granulocytes % 0.2 0.0 - 5.0 %    Lymphocytes % 35.2 20.0 - 42.0 %    Monocytes % 7.9 2.0 - 12.0 %    Eosinophils % 5.2 0.0 - 6.0 %    Basophils % 1.2 0.0 - 2.0 %    Neutrophils Absolute 2.60 1.80 - 7.30 E9/L    Immature Granulocytes # 0.01 E9/L    Lymphocytes Absolute 1.82 1.50 - 4.00 E9/L    Monocytes Absolute 0.41 0.10 - 0.95 E9/L    Eosinophils Absolute 0.27 0.05 - 0.50 E9/L    Basophils Absolute 0.06 0.00 - 0.20 E9/L   APTT   Result Value Ref Range    aPTT 36.6 (H) 24.5 - 35.1 sec   Protime-INR   Result Value Ref Range    Protime 11.0 9.3 - 12.4 sec    INR 1.0    SPECIMEN REJECTION   Result Value Ref Range    Rejected Test cmp bnp trp5 ft     Reason for Rejection see below    Comprehensive Metabolic Panel w/ Reflex to MG   Result Value Ref Range    Sodium 141 132 - 146 mmol/L    Potassium reflex Magnesium 4.0 3.5 - 5.0 mmol/L    Chloride 104 98 - 107 mmol/L    CO2 27 22 - 29 mmol/L    Anion Gap 10 7 - 16 mmol/L    Glucose 104 (H) 74 - 99 mg/dL    BUN 9 6 - 20 mg/dL    CREATININE 0.7 0.5 - 1.0 mg/dL    GFR Non-African American >60 >=60 mL/min/1.73    GFR African American >60     Calcium 9.3 8.6 - 10.2 mg/dL    Total Protein 7.5 6.4 - 8.3 g/dL    Albumin 4.2 3.5 - 5.2 g/dL    Total Bilirubin 0.5 0.0 - 1.2 mg/dL    Alkaline Phosphatase 103 35 - 104 U/L    ALT 13 0 - 32 U/L    AST 23 0 - 31 U/L   MAGNESIUM   Result Value Ref Range    Magnesium 2.2 1.6 - 2.6 mg/dL   Brain Natriuretic Peptide   Result Value Ref Range    Pro- (H) 0 - 125 pg/mL   TSH WITHOUT REFLEX   Result Value Ref Range    TSH 1.860 0.270 - 4.200 uIU/mL   T4, FREE Result Value Ref Range    T4 Free 1.76 (H) 0.93 - 1.70 ng/dL   Troponin   Result Value Ref Range    Troponin, High Sensitivity <6 0 - 9 ng/L   EKG 12 Lead   Result Value Ref Range    Ventricular Rate 110 BPM    Atrial Rate 58 BPM    QRS Duration 92 ms    Q-T Interval 322 ms    QTc Calculation (Bazett) 435 ms    R Axis -14 degrees    T Axis 31 degrees   ,       RADIOLOGY:  Interpreted by Radiologist unless otherwise specified  XR CHEST PORTABLE   Final Result   No acute cardiopulmonary process. EKG Interpretation  Interpreted by emergency department physician, Dr. Lester Cervantes     Date of EK21  Time: 857    Rhythm: atrial fibrillation - rapid  Rate: tachycardia  Axis: left  Conduction: normal  ST Segments: no acute change  T Waves: no acute change    Clinical Impression: Atrial fibrillation with rapid ventricular response  Comparison to prior EKG: changes compared to previous EKG      EKG Interpretation  Interpreted by emergency department physician, Dr. Lester Cervantes     21  Time:     Rhythm: normal sinus   Rate: normal  Axis: left  Conduction: normal  ST Segments: no acute change  T Waves: no acute change    Clinical Impression: Sinus rhythm, no acute ischemic changes    Comparison to Old EKG  Changes from prior (now back to sinus)        ------------------------- NURSING NOTES AND VITALS REVIEWED ---------------------------   The nursing notes within the ED encounter and vital signs as below have been reviewed by myself  BP (!) 142/88   Pulse 61   Temp 97.9 °F (36.6 °C) (Oral)   Resp 24   SpO2 100%     Oxygen Saturation Interpretation: Normal    The patients available past medical records and past encounters were reviewed.         ------------------------------ ED COURSE/MEDICAL DECISION MAKING----------------------  Medications   0.9 % sodium chloride bolus (0 mLs Intravenous Stopped 21 1221)   dilTIAZem injection 10 mg (10 mg Intravenous Given 21 1029)           The cardiac monitor revealed atrial fibrillation with a heart rate in the 140s as interpreted by me. The cardiac monitor was ordered secondary to the patient's palpitations and to monitor the patient for dysrhythmia. CPT [id number]       I, Dr. Marliss Hamman, am the primary provider of record    Medical Decision Making:   Arrives in Afib RVR  IVF bolus and cardizem bolus  She converted to sinus  She denies hx of HTN, CVA, DM, valvular disease, TIA or other conditions. She is back to baseline and feeling better, labs reassuring except her T4 is slightly elevated, she follows with endocrinology and I recommended she call them tomorrow and discuss potentially reducing the thyroid hormone to 100 mcg. I spoke with electrophysiology Dr. April Villalobos, discussed the case, she recommends 25 mg metoprolol daily, a full aspirin daily and she can call the office to arrange follow-up as an outpatient. She is well-appearing and nontoxic. She had no chest pain. High-sensitivity troponin was negative as well. She is hemodynamically stable. She was advised to return if her symptoms worsen or some changes. Oxygen Saturation Interpretation: 100 % on RA. Normal      Re-Evaluations:       Improving  Converted back to sinus      This patient's ED course included: a personal history and physicial examination, re-evaluation prior to disposition, multiple bedside re-evaluations, IV medications, cardiac monitoring, continuous pulse oximetry and complex medical decision making and emergency management    This patient has improved during their ED course. Consultations:  THOMAS Phillips Lunch: The emergency provider has spoken with the patient and family and discussed todays results, in addition to providing specific details for the plan of care and counseling regarding the diagnosis and prognosis.   Questions are answered at this time and they are agreeable with the plan.       --------------------------------- IMPRESSION AND DISPOSITION ---------------------------------    IMPRESSION  1. Atrial fibrillation with rapid ventricular response ([facility]    2. PAF (paroxysmal atrial fibrillation) ([facility]        DISPOSITION  Disposition: Discharge to home  Patient condition is good        NOTE: This report was transcribed using voice recognition software.  Every effort was made to ensure accuracy; however, inadvertent computerized transcription errors may be present            Rosa Harris MD  05/31/21 [id number]

## 2021-06-01 ENCOUNTER — TELEPHONE (OUTPATIENT)
Dept: ADMINISTRATIVE | Age: 51
End: 2021-06-01

## 2021-06-01 LAB — ANTI-NUCLEAR ANTIBODY (ANA): NEGATIVE

## 2021-06-01 NOTE — TELEPHONE ENCOUNTER
Patient Appointment Form:      PCP: Dr. Susan Pete  Referring: Michael MCMANUS    Has the Patient:    Seen a Cardiologist? yes    date:unknown  Physician:Dr. Evita Colón    Had a heart catheterization? no    Had heart surgery? no    Had a stress test or nuclear stress test? yes   date: 1/2/20   facility name:  In [address] [place] [address] Rd    Had an echocardiogram? no    Had a vascular ultrasound? no    Had a 24/48 heart monitor or extended cardiac event monitor? no    Had recent blood work in the last 6 months? yes    date: 5/31/2021    ordering physician: In [address] [place] [address] Rd    Had a pacemaker/ICD/ILR implant? no    Seen an Electrophysiologist? no        Will send records via: in [address] [place] [address]      Date & time of appointment:  June 9, 2021 @ 10:00 a.m.

## 2021-06-02 ENCOUNTER — OFFICE VISIT (OUTPATIENT)
Dept: ENDOCRINOLOGY | Age: 51
End: 2021-06-02
Payer: COMMERCIAL

## 2021-06-02 VITALS
BODY MASS INDEX: 43.84 KG/M2 | HEART RATE: 57 BPM | WEIGHT: 256.8 LBS | HEIGHT: 64 IN | DIASTOLIC BLOOD PRESSURE: 74 MMHG | SYSTOLIC BLOOD PRESSURE: 118 MMHG

## 2021-06-02 DIAGNOSIS — E03.9 HYPOTHYROIDISM, UNSPECIFIED TYPE: ICD-10-CM

## 2021-06-02 DIAGNOSIS — E04.2 MULTINODULAR GOITER: ICD-10-CM

## 2021-06-02 DIAGNOSIS — Z98.84 HX OF GASTRIC BYPASS: ICD-10-CM

## 2021-06-02 DIAGNOSIS — E03.9 HYPOTHYROIDISM, UNSPECIFIED TYPE: Primary | ICD-10-CM

## 2021-06-02 LAB
EKG ATRIAL RATE: 73 BPM
EKG P AXIS: 54 DEGREES
EKG P-R INTERVAL: 172 MS
EKG Q-T INTERVAL: 378 MS
EKG QRS DURATION: 94 MS
EKG QTC CALCULATION (BAZETT): 416 MS
EKG R AXIS: -15 DEGREES
EKG T AXIS: 34 DEGREES
EKG VENTRICULAR RATE: 73 BPM

## 2021-06-02 PROCEDURE — 99214 OFFICE O/P EST MOD 30 MIN: CPT | Performed by: INTERNAL MEDICINE

## 2021-06-02 RX ORDER — LEVOTHYROXINE SODIUM 88 UG/1
CAPSULE ORAL
Qty: 30 CAPSULE | Refills: 11 | Status: SHIPPED
Start: 2021-06-02 | End: 2021-06-02 | Stop reason: SDUPTHER

## 2021-06-02 RX ORDER — LEVOTHYROXINE SODIUM 88 UG/1
CAPSULE ORAL
Qty: 30 CAPSULE | Refills: 11 | Status: SHIPPED
Start: 2021-06-02 | End: 2021-07-02

## 2021-06-02 NOTE — PROGRESS NOTES
[address] [place] [address] Department of Endocrinology Diabetes and Metabolism   [address] [facility] [address]   Phone: [phone]  Fax: [phone]    Date of Service: 6/2/2021  Primary Care Physician: Buddy Wesley DO  Provider: Claudeen Chaco MD        Reason for the visit:  Primary Hypothyroidism    History of Present Illness: The history is provided by the patient. No  was used. Accuracy of the patient data is excellent. Tony East is a very pleasant 46 y.o. female seen today for management of hypothyroidism   The patent was diagnosed with hypothyroidism in her early 19's. She is currently on Tirosint 112 mcg daily. Patient takes Tirosint in the morning at empty stomach, wait one hour before eating , avoid multivitamins containing calcium  or iron with it. Lab Results   Component Value Date/Time    TSH 1.860 05/31/2021 10:18 AM    T4FREE 1.76 (H) 05/31/2021 10:18 AM    B2RRIFL 8.4 10/03/2020 08:12 AM    TSI <0.10 10/03/2020 08:12 AM    TPOABS 2.6 10/03/2020 08:12 AM    THGAB <0.9 10/03/2020 08:12 AM     She wasn't able to tolerate Levothyroxine, name brand synthroid    Patient still c/o fatigue     She had gastric bypass surgery long time ago but unfortunately regained most of her wt back     MNG   Thyroid US 10/2019   Atrophic heterogenous  thyroid gland   Rt lobe measures 2.6 x 0.7 x 0.7 cm, Lt lobe measures 2.1 x \0.8 x 0.5 cm, isthmus measures 0.1 cm   Non specific calcification in interpolar region measures 0.2 cm      Tony East reported some discomfort at thyroid level but denies any voice change, or shortness of breath. No family history of thyroid cancer. No prior history of radiation to head or neck region.     PAST MEDICAL HISTORY   Past Medical History:   Diagnosis Date    Prolonged emergence from general anesthesia     SENSITIVE TO MEDS, TAKES LESS TO PUT HER UNDER, DIFFICULTY WAKING, STOPS BREATHING PER PATIENT    Thyroid disease swallowing,   Cadrdiopulomary: No CP, SOB or palpitation, No orthopnea or PND. No cough or wheezing. GI: No N/V/D, no constipation, No abdominal pain, no melena or hematochezia   : Denied any dysuria, hematuria, flank pain, discharge, or incontinence. Skin: denied any rash, ulcer, Hirsute, or hyperpigmentation. MSK: denied any joint deformity, joint pain/swelling, muscle pain, or back pain. Neuro: no numbess, no tingling, no weakness,     OBJECTIVE    /74   Pulse 57   Ht 5' 3.5\" (1.613 m)   Wt 256 lb 12.8 oz (116.5 kg)   BMI 44.78 kg/m²   BP Readings from Last 4 Encounters:   06/02/21 118/74   05/31/21 (!) 142/88   01/28/21 136/86   12/22/20 124/72     Wt Readings from Last 6 Encounters:   06/02/21 256 lb 12.8 oz (116.5 kg)   01/28/21 252 lb (114.3 kg)   12/22/20 255 lb (115.7 kg)   11/19/20 256 lb (116.1 kg)   09/30/20 252 lb (114.3 kg)   01/29/20 247 lb (112 kg)       Physical examination:  General: awake alert, oriented x3, no abnormal position or movements. HEENT: normocephalic non traumatic  Neck: supple, no LN enlargement, no thyroid tenderness, no JVD. Pulm: Clear equal air entry no added sounds, no wheezing or rhonchi    CVS: S1 + S2, no murmur, no heave. Dorsalis pedis pulse palpable   Abd: soft lax, no tenderness, no organomegaly, audible bowel sounds. Skin: warm, no lesions, no rash.   Musculoskeletal: No back tenderness, no kyphosis/scoliosis   Neuro: CN intact, sensation normal , muscle power normal.  Psych: normal mood, and affect    Review of Laboratory Data:  I have reviewed the following:  Lab Results   Component Value Date/Time    WBC 5.2 05/31/2021 09:10 AM    RBC 4.67 05/31/2021 09:10 AM    HGB 14.3 05/31/2021 09:10 AM    HCT 46.6 05/31/2021 09:10 AM    MCV 99.8 05/31/2021 09:10 AM    MCH 30.6 05/31/2021 09:10 AM    MCHC 30.7 (L) 05/31/2021 09:10 AM    RDW 13.9 05/31/2021 09:10 AM     (L) 05/31/2021 09:10 AM    MPV 11.8 05/31/2021 09:10 AM      Lab Results   Component Value Date/Time     05/31/2021 10:18 AM    K 4.0 05/31/2021 10:18 AM    CO2 27 05/31/2021 10:18 AM    BUN 9 05/31/2021 10:18 AM    CREATININE 0.7 05/31/2021 10:18 AM    CALCIUM 9.3 05/31/2021 10:18 AM    LABGLOM >60 05/31/2021 10:18 AM    GFRAA >60 05/31/2021 10:18 AM      Lab Results   Component Value Date/Time    TSH 1.860 05/31/2021 10:18 AM    T4FREE 1.76 (H) 05/31/2021 10:18 AM    T9DEGYX 8.4 10/03/2020 08:12 AM    TSI <0.10 10/03/2020 08:12 AM    TPOABS 2.6 10/03/2020 08:12 AM    THGAB <0.9 10/03/2020 08:12 AM     Lab Results   Component Value Date    GLUCOSE 104 05/31/2021    GLUCOSE 88 12/08/2010     Lab Results   Component Value Date    TRIG 83 05/28/2021    HDL 83 05/28/2021    LDLCALC 106 05/28/2021    CHOL 206 05/28/2021     Lab Results   Component Value Date    VITD25 32 05/28/2021    VITD25 21 09/20/2018     ASSESSMENT & RECOMMENDATIONS   Martha Mckeon, a 46 y.o.-old female seen in for following issues     Primary hypothyroidism   · Change Tirosint capsule to 88 mcg daily   · Discussed the risk of cardiac arrhythmia and loss of bone density with over treating hypothyroidism   · Previous work up showed normal Pituitary hormones     VitD deficiency  · Continue vitD supplement      H/o thyroid nodules   · I have reviewed most recent thyroid US images myself (study date 10/2019). Thyroid gland is very heterogenous and atrophic without any discrete nodules. Finding very consistent with chronic thyroiditis. Non specific tiny calcification seen in interpolar region measures 0.2 cm    · Continue monitoring with US     I personally reviewed external notes from PCP and other patient's care team providers, and personally interpreted labs associated with the above diagnosis. I also ordered labs to further assess and manage the above addressed medical conditions    Return in about 4 months (around 10/2/2021) for hypothyroidism, VitD deficiency.     The above issues were reviewed with the patient who understood and agreed with the plan. Thank you for allowing us to participate in the care of this patient. Please do not hesitate to contact us with any additional questions. Diagnosis Orders   1. Hypothyroidism, unspecified type  FREE T4    TSH without Reflex    DISCONTINUED: TIROSINT 88 MCG CAPS   2. Multinodular goiter     3. Hx of gastric bypass         Jeny Farrar MD  Endocrinologist, [facility] Diabetes Care and Endocrinology   [address] [place] [address]   Phone: [phone]  Fax: [phone]  ------------------------------  An electronic signature was used to authenticate this note.  Delia Alaniz MD on 6/2/2021 at 1:32 PM

## 2021-06-03 LAB
CYCLIC CITRULLINATED PEPTIDE ANTIBODY IGG: 1.4 U/ML (ref 0–7)
HLA B27: POSITIVE
HLAB27 PCR SPECIMEN: ABNORMAL

## 2021-06-06 ENCOUNTER — APPOINTMENT (OUTPATIENT)
Dept: GENERAL RADIOLOGY | Age: 51
End: 2021-06-06
Payer: COMMERCIAL

## 2021-06-06 ENCOUNTER — HOSPITAL ENCOUNTER (OUTPATIENT)
Age: 51
Setting detail: OBSERVATION
Discharge: HOME | End: 2021-06-07
Attending: EMERGENCY MEDICINE | Admitting: INTERNAL MEDICINE
Payer: COMMERCIAL

## 2021-06-06 DIAGNOSIS — R00.9 HEART RATE PROBLEM: Primary | ICD-10-CM

## 2021-06-06 PROBLEM — R00.2 PALPITATIONS: Status: ACTIVE | Noted: 2021-06-06

## 2021-06-06 LAB
ALBUMIN SERPL-MCNC: 4.4 G/DL (ref 3.5–5.2)
ALP BLD-CCNC: 97 U/L (ref 35–104)
ALT SERPL-CCNC: 11 U/L (ref 0–32)
ANION GAP SERPL CALCULATED.3IONS-SCNC: 11 MMOL/L (ref 7–16)
AST SERPL-CCNC: 17 U/L (ref 0–31)
BASOPHILS ABSOLUTE: 0.07 E9/L (ref 0–0.2)
BASOPHILS RELATIVE PERCENT: 1.1 % (ref 0–2)
BILIRUB SERPL-MCNC: 0.7 MG/DL (ref 0–1.2)
BUN BLDV-MCNC: 10 MG/DL (ref 6–20)
CALCIUM SERPL-MCNC: 9.5 MG/DL (ref 8.6–10.2)
CHLORIDE BLD-SCNC: 99 MMOL/L (ref 98–107)
CO2: 27 MMOL/L (ref 22–29)
CREAT SERPL-MCNC: 0.7 MG/DL (ref 0.5–1)
D DIMER: 646 NG/ML DDU
EKG ATRIAL RATE: 59 BPM
EKG P AXIS: 51 DEGREES
EKG P-R INTERVAL: 160 MS
EKG Q-T INTERVAL: 392 MS
EKG QRS DURATION: 94 MS
EKG QTC CALCULATION (BAZETT): 388 MS
EKG R AXIS: -5 DEGREES
EKG T AXIS: 39 DEGREES
EKG VENTRICULAR RATE: 59 BPM
EOSINOPHILS ABSOLUTE: 0.29 E9/L (ref 0.05–0.5)
EOSINOPHILS RELATIVE PERCENT: 4.6 % (ref 0–6)
GFR AFRICAN AMERICAN: >60
GFR NON-AFRICAN AMERICAN: >60 ML/MIN/1.73
GLUCOSE BLD-MCNC: 105 MG/DL (ref 74–99)
HCT VFR BLD CALC: 44.2 % (ref 34–48)
HEMOGLOBIN: 14.2 G/DL (ref 11.5–15.5)
IMMATURE GRANULOCYTES #: 0.01 E9/L
IMMATURE GRANULOCYTES %: 0.2 % (ref 0–5)
INR BLD: 1.1
LYMPHOCYTES ABSOLUTE: 1.76 E9/L (ref 1.5–4)
LYMPHOCYTES RELATIVE PERCENT: 28.1 % (ref 20–42)
MAGNESIUM: 2.1 MG/DL (ref 1.6–2.6)
MCH RBC QN AUTO: 30 PG (ref 26–35)
MCHC RBC AUTO-ENTMCNC: 32.1 % (ref 32–34.5)
MCV RBC AUTO: 93.4 FL (ref 80–99.9)
MONOCYTES ABSOLUTE: 0.48 E9/L (ref 0.1–0.95)
MONOCYTES RELATIVE PERCENT: 7.7 % (ref 2–12)
NEUTROPHILS ABSOLUTE: 3.66 E9/L (ref 1.8–7.3)
NEUTROPHILS RELATIVE PERCENT: 58.3 % (ref 43–80)
PDW BLD-RTO: 13.6 FL (ref 11.5–15)
PLATELET # BLD: 275 E9/L (ref 130–450)
PMV BLD AUTO: 10.6 FL (ref 7–12)
POTASSIUM SERPL-SCNC: 3.8 MMOL/L (ref 3.5–5)
PROTHROMBIN TIME: 12 SEC (ref 9.3–12.4)
RBC # BLD: 4.73 E12/L (ref 3.5–5.5)
SODIUM BLD-SCNC: 137 MMOL/L (ref 132–146)
T3 TOTAL: 82.29 NG/DL (ref 80–200)
T4 TOTAL: 8.4 MCG/DL (ref 4.5–11.7)
TOTAL PROTEIN: 7.6 G/DL (ref 6.4–8.3)
TROPONIN, HIGH SENSITIVITY: <6 NG/L (ref 0–9)
TSH SERPL DL<=0.05 MIU/L-ACNC: 6.84 UIU/ML (ref 0.27–4.2)
WBC # BLD: 6.3 E9/L (ref 4.5–11.5)

## 2021-06-06 PROCEDURE — 93005 ELECTROCARDIOGRAM TRACING: CPT | Performed by: NURSE PRACTITIONER

## 2021-06-06 PROCEDURE — 85610 PROTHROMBIN TIME: CPT

## 2021-06-06 PROCEDURE — 93010 ELECTROCARDIOGRAM REPORT: CPT | Performed by: INTERNAL MEDICINE

## 2021-06-06 PROCEDURE — 2580000003 HC RX 258: Performed by: NURSE PRACTITIONER

## 2021-06-06 PROCEDURE — 84436 ASSAY OF TOTAL THYROXINE: CPT

## 2021-06-06 PROCEDURE — 80053 COMPREHEN METABOLIC PANEL: CPT

## 2021-06-06 PROCEDURE — 85378 FIBRIN DEGRADE SEMIQUANT: CPT

## 2021-06-06 PROCEDURE — 85025 COMPLETE CBC W/AUTO DIFF WBC: CPT

## 2021-06-06 PROCEDURE — G0378 HOSPITAL OBSERVATION PER HR: HCPCS

## 2021-06-06 PROCEDURE — 84484 ASSAY OF TROPONIN QUANT: CPT

## 2021-06-06 PROCEDURE — 96372 THER/PROPH/DIAG INJ SC/IM: CPT

## 2021-06-06 PROCEDURE — 6360000002 HC RX W HCPCS: Performed by: NURSE PRACTITIONER

## 2021-06-06 PROCEDURE — 36415 COLL VENOUS BLD VENIPUNCTURE: CPT

## 2021-06-06 PROCEDURE — 93005 ELECTROCARDIOGRAM TRACING: CPT | Performed by: EMERGENCY MEDICINE

## 2021-06-06 PROCEDURE — 84480 ASSAY TRIIODOTHYRONINE (T3): CPT

## 2021-06-06 PROCEDURE — 71045 X-RAY EXAM CHEST 1 VIEW: CPT

## 2021-06-06 PROCEDURE — 84443 ASSAY THYROID STIM HORMONE: CPT

## 2021-06-06 PROCEDURE — 83735 ASSAY OF MAGNESIUM: CPT

## 2021-06-06 PROCEDURE — 99283 EMERGENCY DEPT VISIT LOW MDM: CPT

## 2021-06-06 RX ORDER — ONDANSETRON 2 MG/ML
4 INJECTION INTRAMUSCULAR; INTRAVENOUS EVERY 6 HOURS PRN
Status: DISCONTINUED | OUTPATIENT
Start: 2021-06-06 | End: 2021-06-07 | Stop reason: HOSPADM

## 2021-06-06 RX ORDER — ONDANSETRON 8 MG/1
TABLET, ORALLY DISINTEGRATING ORAL
COMMUNITY
End: 2021-06-09

## 2021-06-06 RX ORDER — PANTOPRAZOLE SODIUM 40 MG/1
40 TABLET, DELAYED RELEASE ORAL
Status: DISCONTINUED | OUTPATIENT
Start: 2021-06-07 | End: 2021-06-07 | Stop reason: HOSPADM

## 2021-06-06 RX ORDER — POLYETHYLENE GLYCOL 3350 17 G/17G
17 POWDER, FOR SOLUTION ORAL DAILY PRN
Status: DISCONTINUED | OUTPATIENT
Start: 2021-06-06 | End: 2021-06-07 | Stop reason: HOSPADM

## 2021-06-06 RX ORDER — LEVOTHYROXINE SODIUM 88 UG/1
88 CAPSULE ORAL DAILY
Status: DISCONTINUED | OUTPATIENT
Start: 2021-06-06 | End: 2021-06-07 | Stop reason: HOSPADM

## 2021-06-06 RX ORDER — SODIUM CHLORIDE 9 MG/ML
25 INJECTION, SOLUTION INTRAVENOUS PRN
Status: DISCONTINUED | OUTPATIENT
Start: 2021-06-06 | End: 2021-06-07 | Stop reason: HOSPADM

## 2021-06-06 RX ORDER — SODIUM CHLORIDE 0.9 % (FLUSH) 0.9 %
5-40 SYRINGE (ML) INJECTION EVERY 12 HOURS SCHEDULED
Status: DISCONTINUED | OUTPATIENT
Start: 2021-06-06 | End: 2021-06-07 | Stop reason: HOSPADM

## 2021-06-06 RX ORDER — ONDANSETRON 4 MG/1
4 TABLET, ORALLY DISINTEGRATING ORAL EVERY 8 HOURS PRN
Status: DISCONTINUED | OUTPATIENT
Start: 2021-06-06 | End: 2021-06-07 | Stop reason: HOSPADM

## 2021-06-06 RX ORDER — SODIUM CHLORIDE 0.9 % (FLUSH) 0.9 %
5-40 SYRINGE (ML) INJECTION PRN
Status: DISCONTINUED | OUTPATIENT
Start: 2021-06-06 | End: 2021-06-07 | Stop reason: HOSPADM

## 2021-06-06 RX ADMIN — SODIUM CHLORIDE, PRESERVATIVE FREE 10 ML: 5 INJECTION INTRAVENOUS at 21:49

## 2021-06-06 RX ADMIN — ENOXAPARIN SODIUM 40 MG: 40 INJECTION SUBCUTANEOUS at 21:49

## 2021-06-06 ASSESSMENT — ENCOUNTER SYMPTOMS
PHOTOPHOBIA: 0
ABDOMINAL PAIN: 0
BACK PAIN: 0
SHORTNESS OF BREATH: 1
COUGH: 0
SORE THROAT: 0
NAUSEA: 0

## 2021-06-06 NOTE — H&P
Hospitalist History & Physical      PCP: Abhay Taveras DO    Date of Admission: 6/6/2021    Date of Service: Pt seen/examined on 6/6/2021 and is Placed in Observation. Chief Complaint:  had concerns including Irregular Heart Beat ((was here on Monday diagnosed with AFib, now feels like \"my heart is beating too slow\" and \"just havent felt right\") Feels dizzy). History Of Present Illness:    Ms. Lisa Keller is a 46y.o. year old female  who  has a past medical history of Atrial fibrillation ([address] Prolonged emergence from general anesthesia, and Thyroid disease. She presented to the ER on 5/31/21 with complaints of palpitations x 1 day. She was found to be in Afib with RVR and was given IVF and a cardizem bolus. She converted to NSR. Her T4 was noted to be elevated and it was recommended that she talk to her endocrinologist regarding decreasing her thyroid hormone. EP was consulted from the ED and recommended metoprolol 25 mg PO daily and to follow up as an outpatient. She was discharged. It was recommended that she make an appointment for an echocardiogram as well as follow up appointment with Dr. Nestor Guzmán however there is no record of the echo being scheduled. She followed up with endocrine and her thyroid hormone was decreased to 88 mcg. She stopped taking the metoprolol she was prescribed on 5/31 after just one day because she saw her cardiologist at [facility] on 6/1 and he told her that her BP was too low (systolic 399 mmHg) and that she shouldn't take it. She presents to the ER again today, 6/6/21, with complaints of intermittent palpitations, intermittent shortness of breath and intermittent light headedness. She reported that she woke herself up in the middle of the night last night with a loud snore and that she noted her heart rate to be in the 40s. She states that she felt cold and was shivering. She is concerned that perhaps her thyroid is the cause of her presumed apnea.  She expresses many other concerns including labile blood pressures, poor exercise tolerance, pulsating above her right eye for which she had an MRI and MRA of the head and neck at [facility] (both without acute findings), her hx of goiter now \"thyroid shriveling up\", hx of receiving IV iron that \"has a ton of side effects if you google it,\" resolved anemia and ferritin levels which were elevated at one time. Work up in the ER was unremarkable with the exception of Ddimer 646. EKG showed SB rate 59 with no ischemic changes. She stated that she does not feel comfortable going home. She was admitted for observation. Past Medical History:        Diagnosis Date    Atrial fibrillation (HCC)     Prolonged emergence from general anesthesia     SENSITIVE TO MEDS, TAKES LESS TO PUT HER UNDER, DIFFICULTY WAKING, STOPS BREATHING PER PATIENT    Thyroid disease        Past Surgical History:        Procedure Laterality Date    ABDOMEN SURGERY       SECTION      x2    CHOLECYSTECTOMY      COLONOSCOPY N/A 2020    COLORECTAL CANCER SCREENING, HIGH RISK performed by Marlon Cortes MD at [facility]      TUBAL LIGATION      UPPER GASTROINTESTINAL ENDOSCOPY N/A 2020    EGD BIOPSY performed by Marlon Cortes MD at [address] [place] [address]       Medications Prior to Admission:      Prior to Admission medications    Medication Sig Start Date End Date Taking?  Authorizing Provider   TIROSINT 88 MCG CAPS Take 1 capsule daily 21  Yes Juan Manuel Simeon MD   metoprolol tartrate (LOPRESSOR) 25 MG tablet Take 25 mg by mouth as needed   Yes Historical Provider, MD   omeprazole (PRILOSEC) 20 MG delayed release capsule Take 20 mg by mouth daily   Yes Historical Provider, MD   LORazepam (ATIVAN) 0.5 MG tablet lorazepam 0.5 mg tablet   Yes Historical Provider, MD   ondansetron (ZOFRAN-ODT) 8 MG TBDP disintegrating tablet ondansetron 8 mg disintegrating 0.7   MG 2.1     LFT:  Recent Labs     06/06/21  0202   PROT 7.6   ALKPHOS 97   ALT 11   AST 17   BILITOT 0.7     CE:  No results for input(s): Liliana Metz in the last 72 hours. PT/INR:   Recent Labs     06/06/21  0202   INR 1.1     BNP: No results for input(s): BNP in the last 72 hours. ESR:   Lab Results   Component Value Date    SEDRATE 18 05/28/2021     CRP:   Lab Results   Component Value Date    CRP 0.5 (H) 05/28/2021     D Dimer:   Lab Results   Component Value Date    DDIMER 646 06/06/2021      Folate and B12:   Lab Results   Component Value Date    CYPVQAMX07 739 01/03/2020   ,   Lab Results   Component Value Date    FOLATE 17.3 09/20/2018     Lactic Acid: No results found for: LACTA  Thyroid Studies:   Lab Results   Component Value Date    TSH 1.860 05/31/2021    Y7GRBNN 8.4 10/03/2020       Oupatient labs:  Lab Results   Component Value Date    CHOL 206 (H) 05/28/2021    TRIG 83 05/28/2021    HDL 83 05/28/2021    LDLCALC 106 (H) 05/28/2021    TSH 1.860 05/31/2021    INR 1.1 06/06/2021       Urinalysis:    Lab Results   Component Value Date    NITRU Negative 09/24/2016    BLOODU trace-intact 07/26/2019    BLOODU Negative 09/24/2016    SPECGRAV 1.010 07/26/2019    SPECGRAV 1.010 09/24/2016    GLUCOSEU negative 07/26/2019    GLUCOSEU Negative 09/24/2016       Imaging:  XR CHEST PORTABLE    Result Date: 6/6/2021  Negative single view chest for acute process. XR CHEST PORTABLE    Result Date: 5/31/2021  No acute cardiopulmonary process. ASSESSMENT/PLAN:    Recently diagnosed paroxysmal Afib with RVR- initial encounter on 5/31, converted to NSR after IVF and cardizem bolus. Was started on metoprolol per EP recommendation however she stopped it on 6/1 per her cardiologist at [facility] due to concerns for hypotension (systolic ). She states that she scheduled an appointment with EP for next week but that she can't wait. Consult EP. Obtain echo.     Hypothyroidism- follows with endocrine, continue tirosint. Obtain TSH, T3 and T4. Vitamin D deficiency- continue supplementation    Morbid obesity- Body mass index is 43.59 kg/m²., s/p gastric bypass however has gained all of the weight back    Anxiety- on ativan PRN at home    Diet: No diet orders on file  Code Status: Prior  Surrogate decision maker confirmed with patient:   Extended Emergency Contact Information  Primary Emergency Contact: Farrukh Salcido  Address: [address] [place] [address], [address] [place] [address] Phone: [phone]  Relation: Spouse    DVT Prophylaxis: [x]Lovenox []Heparin []PCD [] [address] []Encouraged ambulation  Disposition: [x]Med/Surg [] Intermediate [] ICU/CCU  Admit status: [x] Observation [] Inpatient     +++++++++++++++++++++++++++++++++++++++++++++++++  [address] [place], [place]  +++++++++++++++++++++++++++++++++++++++++++++++++  NOTE: This report was transcribed using voice recognition software. Every effort was made to ensure accuracy; however, inadvertent computerized transcription errors may be present.

## 2021-06-06 NOTE — ED NOTES
FIRST PROVIDER CONTACT ASSESSMENT NOTE      Department of Emergency Medicine   21  1:48 AM EDT    Chief Complaint: No chief complaint on file. History of Present Illness:   Elayne Gaffney is a 46 y.o. female who presents to the ED for    Medical History:  has a past medical history of Prolonged emergence from general anesthesia and Thyroid disease. Surgical History:  has a past surgical history that includes Cholecystectomy; Tubal ligation; Gastric bypass surgery;  section; hernia repair; Hysterectomy; Abdomen surgery; Upper gastrointestinal endoscopy (N/A, 2020); and Colonoscopy (N/A, 2020). Social History:  reports that she has never smoked. She has never used smokeless tobacco. She reports that she does not drink alcohol and does not use drugs. Family History: family history is not on file.     *ALLERGIES*     Avelox [moxifloxacin hcl in nacl], Cephalexin, Ciprofloxacin, Magnesium sulfate, Penicillins, Percocet [oxycodone-acetaminophen], and Sulfa antibiotics     Physical Exam:      VS:  Pulse 84   Temp 97.1 °F (36.2 °C) (Temporal)   SpO2 96%      Initial Plan of Care:  Initiate Treatment-Testing, Proceed toTreatment Area When Bed Available for ED Attending/MLP to Continue Care    -----------------END OF FIRST PROVIDER CONTACT ASSESSMENT NOTE--------------  Electronically signed by ROSS Hernandez CNP   DD: 21             ROSS Camacho CNP  21 0148

## 2021-06-06 NOTE — PROGRESS NOTES
Fina Morales was ordered TIROSINT 80 MCG CAPS which is a nonformulary medication. The patient has indicated that the home supply of this medication will be brought in to the hospital for inpatient use. If the medication has not been administered by 1400 on the following day from the time the order was placed, a pharmacist will follow-up with the nurse of the patient to assess the capability of the patient to bring in the medication. If it is determined that the patient cannot supply the medication and it is not available to be dispensed from the pharmacy, a call will be placed to the ordering provider to discuss alternative options.

## 2021-06-06 NOTE — ED PROVIDER NOTES
71-year-old female with a history of hypothyroidism recently diagnosed with A. fib 5/31 presenting for low heart rate. She states she woke up through the night after a loud snore woke her up, and she noted her heart rate was in the 40s. She states she felt cold and was shivering. She states she was started on metoprolol on Monday, but saw her doctor on Tuesday who told her to stop taking it as her blood pressure was 632 systolic and he was read about getting too low. She states she has not taken it since. Her thyroid medication was also decreased Monday as well. She does complain of intermittent palpitations and some shortness of breath and lightheadedness on exertion as well. She denies any chest pain, fever, and nausea. Palpitations  Palpitations quality:  Slow  Onset quality: At rest  Timing:  Intermittent  Chronicity:  New  Relieved by:  Nothing  Worsened by:  Nothing  Ineffective treatments:  None tried  Associated symptoms: shortness of breath    Associated symptoms: no back pain, no chest pain, no cough and no nausea         Review of Systems   Constitutional: Positive for chills. Negative for fever. HENT: Negative for congestion and sore throat. Eyes: Negative for photophobia and visual disturbance. Respiratory: Positive for shortness of breath. Negative for cough. Cardiovascular: Positive for palpitations. Negative for chest pain and leg swelling. Gastrointestinal: Negative for abdominal pain and nausea. Genitourinary: Negative for dysuria and flank pain. Musculoskeletal: Negative for back pain and myalgias. Skin: Negative for rash and wound. Neurological: Positive for light-headedness. Negative for headaches. Physical Exam  Constitutional:       General: She is not in acute distress. Appearance: She is not ill-appearing or toxic-appearing. Comments: Patient lying in bed, appears well, no distress   HENT:      Head: Normocephalic and atraumatic. Mouth/Throat:      Mouth: Mucous membranes are moist.   Eyes:      Extraocular Movements: Extraocular movements intact. Conjunctiva/sclera: Conjunctivae normal.   Cardiovascular:      Rate and Rhythm: Normal rate and regular rhythm. Pulmonary:      Effort: Pulmonary effort is normal.      Breath sounds: Normal breath sounds. Abdominal:      General: Abdomen is flat. There is no distension. Tenderness: There is no abdominal tenderness. Musculoskeletal:         General: No swelling or tenderness. Cervical back: Normal range of motion and neck supple. Skin:     General: Skin is warm and dry. Neurological:      General: No focal deficit present. Mental Status: She is alert. Cranial Nerves: No cranial nerve deficit. Psychiatric:         Mood and Affect: Mood normal.         Thought Content: Thought content normal.          Procedures     MDM  Number of Diagnoses or Management Options  Heart rate problem  Diagnosis management comments: 51-year-old female recently diagnosed with A. fib presenting with complaint of low heart rate. Vital signs stable, EKG did not show any acute process, showed rate 62. Troponin negative. Chest x-ray negative. All other lab work unremarkable. Patient is not comfortable going home with fluctuations in her heart rate. She states that he is going to the 40s and sometimes is elevated and she feels palpitations. Due to variability of heart rate, patient was made to admit patient for observation. She remained hemodynamically stable in the ED and was admitted to [facility] physicians.                         --------------------------------------------- PAST HISTORY ---------------------------------------------  Past Medical History:  has a past medical history of Atrial fibrillation ([facility] [address] Prolonged emergence from general anesthesia, and Thyroid disease. Past Surgical History:  has a past surgical history that includes Cholecystectomy;  Tubal ligation; Gastric bypass surgery;  section; hernia repair; Hysterectomy; Abdomen surgery; Upper gastrointestinal endoscopy (N/A, 2020); and Colonoscopy (N/A, 2020). Social History:  reports that she has never smoked. She has never used smokeless tobacco. She reports that she does not drink alcohol and does not use drugs. Family History: family history is not on file. The patients home medications have been reviewed. Allergies:  Avelox [moxifloxacin hcl in nacl], Cephalexin, Ciprofloxacin, Magnesium sulfate, Penicillins, Percocet [oxycodone-acetaminophen], and Sulfa antibiotics    -------------------------------------------------- RESULTS -------------------------------------------------    LABS:  Results for orders placed or performed during the hospital encounter of 21   CBC Auto Differential   Result Value Ref Range    WBC 6.3 4.5 - 11.5 E9/L    RBC 4.73 3.50 - 5.50 E12/L    Hemoglobin 14.2 11.5 - 15.5 g/dL    Hematocrit 44.2 34.0 - 48.0 %    MCV 93.4 80.0 - 99.9 fL    MCH 30.0 26.0 - 35.0 pg    MCHC 32.1 32.0 - 34.5 %    RDW 13.6 11.5 - 15.0 fL    Platelets 868 806 - 740 E9/L    MPV 10.6 7.0 - 12.0 fL    Neutrophils % 58.3 43.0 - 80.0 %    Immature Granulocytes % 0.2 0.0 - 5.0 %    Lymphocytes % 28.1 20.0 - 42.0 %    Monocytes % 7.7 2.0 - 12.0 %    Eosinophils % 4.6 0.0 - 6.0 %    Basophils % 1.1 0.0 - 2.0 %    Neutrophils Absolute 3.66 1.80 - 7.30 E9/L    Immature Granulocytes # 0.01 E9/L    Lymphocytes Absolute 1.76 1.50 - 4.00 E9/L    Monocytes Absolute 0.48 0.10 - 0.95 E9/L    Eosinophils Absolute 0.29 0.05 - 0.50 E9/L    Basophils Absolute 0.07 0.00 - 0.20 E9/L   Comprehensive Metabolic Panel   Result Value Ref Range    Sodium 137 132 - 146 mmol/L    Potassium 3.8 3.5 - 5.0 mmol/L    Chloride 99 98 - 107 mmol/L    CO2 27 22 - 29 mmol/L    Anion Gap 11 7 - 16 mmol/L    Glucose 105 (H) 74 - 99 mg/dL    BUN 10 6 - 20 mg/dL    CREATININE 0.7 0.5 - 1.0 mg/dL    GFR Non-African American >60 >=60 mL/min/1.73    GFR African American >60     Calcium 9.5 8.6 - 10.2 mg/dL    Total Protein 7.6 6.4 - 8.3 g/dL    Albumin 4.4 3.5 - 5.2 g/dL    Total Bilirubin 0.7 0.0 - 1.2 mg/dL    Alkaline Phosphatase 97 35 - 104 U/L    ALT 11 0 - 32 U/L    AST 17 0 - 31 U/L   Troponin   Result Value Ref Range    Troponin, High Sensitivity <6 0 - 9 ng/L   Protime-INR   Result Value Ref Range    Protime 12.0 9.3 - 12.4 sec    INR 1.1    Magnesium   Result Value Ref Range    Magnesium 2.1 1.6 - 2.6 mg/dL   EKG 12 Lead   Result Value Ref Range    Ventricular Rate 59 BPM    Atrial Rate 59 BPM    P-R Interval 160 ms    QRS Duration 94 ms    Q-T Interval 392 ms    QTc Calculation (Bazett) 388 ms    P Axis 51 degrees    R Axis -5 degrees    T Axis 39 degrees       RADIOLOGY:  XR CHEST PORTABLE   Final Result   Negative single view chest for acute process. EKG:  This EKG is signed and interpreted by me. Rate: 62  Rhythm: Sinus  Interpretation: no acute changes  Comparison: was normal      ------------------------- NURSING NOTES AND VITALS REVIEWED ---------------------------  Date / Time Roomed:  6/6/2021  6:52 AM  ED Bed Assignment:  16/16    The nursing notes within the ED encounter and vital signs as below have been reviewed.      Patient Vitals for the past 24 hrs:   BP Temp Temp src Pulse Resp SpO2 Height Weight   06/06/21 0730 (!) 154/93 -- -- 61 14 95 % -- --   06/06/21 0703 (!) 185/97 -- -- 71 18 94 % -- --   06/06/21 0546 -- -- -- 75 20 95 % -- --   06/06/21 0151 (!) 192/110 -- -- 77 12 -- 5' 3.5\" (1.613 m) 250 lb (113.4 kg)   06/06/21 0041 -- 97.1 °F (36.2 °C) Temporal 84 -- 96 % -- --       Oxygen Saturation Interpretation: Normal    ------------------------------------------ PROGRESS NOTES ------------------------------------------    Counseling:  I have spoken with the patient and discussed todays results, in addition to providing specific details for the plan of care and counseling regarding the diagnosis and prognosis. Their questions are answered at this time and they are agreeable with the plan of admission.    --------------------------------- ADDITIONAL PROVIDER NOTES ---------------------------------    This patient's ED course included: a personal history and physicial examination, re-evaluation prior to disposition, multiple bedside re-evaluations, cardiac monitoring and continuous pulse oximetry    This patient has remained hemodynamically stable during their ED course. Diagnosis:  1. Heart rate problem        Disposition:  Patient's disposition: Admit to telemetry  Patient's condition is stable.          Viral Holden MD  Resident  06/06/21 1958

## 2021-06-06 NOTE — PLAN OF CARE
Problem:  Activity:  Goal: Ability to tolerate increased activity will improve  Description: Ability to tolerate increased activity will improve  Outcome: Ongoing  Goal: Expression of feelings of increased energy will increase  Description: Expression of feelings of increased energy will increase  Outcome: Ongoing     Problem: Cardiac:  Goal: Ability to maintain an adequate cardiac output will improve  Description: Ability to maintain an adequate cardiac output will improve  Outcome: Ongoing  Goal: Complications related to the disease process, condition or treatment will be avoided or minimized  Description: Complications related to the disease process, condition or treatment will be avoided or minimized  Outcome: Ongoing     Problem: Coping:  Goal: Level of anxiety will decrease  Description: Level of anxiety will decrease  Outcome: Ongoing  Goal: General experience of comfort will improve  Description: General experience of comfort will improve  Outcome: Ongoing     Problem: Health Behavior:  Goal: Ability to manage health-related needs will improve  Description: Ability to manage health-related needs will improve  Outcome: Ongoing     Problem: Safety:  Goal: Ability to remain free from injury will improve  Description: Ability to remain free from injury will improve  Outcome: Ongoing  Goal: Will show no signs and symptoms of excessive bleeding  Description: Will show no signs and symptoms of excessive bleeding  Outcome: Ongoing

## 2021-06-07 ENCOUNTER — TELEPHONE (OUTPATIENT)
Dept: NON INVASIVE DIAGNOSTICS | Age: 51
End: 2021-06-07

## 2021-06-07 VITALS
TEMPERATURE: 98.2 F | HEART RATE: 74 BPM | WEIGHT: 250 LBS | HEIGHT: 64 IN | OXYGEN SATURATION: 97 % | BODY MASS INDEX: 42.68 KG/M2 | SYSTOLIC BLOOD PRESSURE: 111 MMHG | RESPIRATION RATE: 18 BRPM | DIASTOLIC BLOOD PRESSURE: 75 MMHG

## 2021-06-07 DIAGNOSIS — G47.30 BREATHING-RELATED SLEEP DISORDER: Primary | ICD-10-CM

## 2021-06-07 DIAGNOSIS — R00.2 PALPITATIONS: Primary | ICD-10-CM

## 2021-06-07 LAB
ALBUMIN SERPL-MCNC: 3.9 G/DL (ref 3.5–5.2)
ALP BLD-CCNC: 84 U/L (ref 35–104)
ALT SERPL-CCNC: 11 U/L (ref 0–32)
ANION GAP SERPL CALCULATED.3IONS-SCNC: 10 MMOL/L (ref 7–16)
AST SERPL-CCNC: 16 U/L (ref 0–31)
BASOPHILS ABSOLUTE: 0.06 E9/L (ref 0–0.2)
BASOPHILS RELATIVE PERCENT: 1.2 % (ref 0–2)
BILIRUB SERPL-MCNC: 0.6 MG/DL (ref 0–1.2)
BUN BLDV-MCNC: 12 MG/DL (ref 6–20)
CALCIUM SERPL-MCNC: 9.3 MG/DL (ref 8.6–10.2)
CHLORIDE BLD-SCNC: 102 MMOL/L (ref 98–107)
CO2: 28 MMOL/L (ref 22–29)
CREAT SERPL-MCNC: 0.7 MG/DL (ref 0.5–1)
EKG ATRIAL RATE: 62 BPM
EKG P AXIS: 60 DEGREES
EKG P-R INTERVAL: 164 MS
EKG Q-T INTERVAL: 418 MS
EKG QRS DURATION: 96 MS
EKG QTC CALCULATION (BAZETT): 424 MS
EKG R AXIS: -5 DEGREES
EKG T AXIS: 46 DEGREES
EKG VENTRICULAR RATE: 62 BPM
EOSINOPHILS ABSOLUTE: 0.39 E9/L (ref 0.05–0.5)
EOSINOPHILS RELATIVE PERCENT: 7.8 % (ref 0–6)
GFR AFRICAN AMERICAN: >60
GFR NON-AFRICAN AMERICAN: >60 ML/MIN/1.73
GLUCOSE BLD-MCNC: 86 MG/DL (ref 74–99)
HCT VFR BLD CALC: 40.8 % (ref 34–48)
HEMOGLOBIN: 13 G/DL (ref 11.5–15.5)
IMMATURE GRANULOCYTES #: 0.02 E9/L
IMMATURE GRANULOCYTES %: 0.4 % (ref 0–5)
LYMPHOCYTES ABSOLUTE: 1.42 E9/L (ref 1.5–4)
LYMPHOCYTES RELATIVE PERCENT: 28.6 % (ref 20–42)
MCH RBC QN AUTO: 30.3 PG (ref 26–35)
MCHC RBC AUTO-ENTMCNC: 31.9 % (ref 32–34.5)
MCV RBC AUTO: 95.1 FL (ref 80–99.9)
MONOCYTES ABSOLUTE: 0.47 E9/L (ref 0.1–0.95)
MONOCYTES RELATIVE PERCENT: 9.5 % (ref 2–12)
NEUTROPHILS ABSOLUTE: 2.61 E9/L (ref 1.8–7.3)
NEUTROPHILS RELATIVE PERCENT: 52.5 % (ref 43–80)
PDW BLD-RTO: 13.6 FL (ref 11.5–15)
PLATELET # BLD: 243 E9/L (ref 130–450)
PMV BLD AUTO: 10.7 FL (ref 7–12)
POTASSIUM REFLEX MAGNESIUM: 4.2 MMOL/L (ref 3.5–5)
RBC # BLD: 4.29 E12/L (ref 3.5–5.5)
SODIUM BLD-SCNC: 140 MMOL/L (ref 132–146)
T4 FREE: 1.29 NG/DL (ref 0.93–1.7)
TOTAL PROTEIN: 6.8 G/DL (ref 6.4–8.3)
WBC # BLD: 5 E9/L (ref 4.5–11.5)

## 2021-06-07 PROCEDURE — 36415 COLL VENOUS BLD VENIPUNCTURE: CPT

## 2021-06-07 PROCEDURE — 99244 OFF/OP CNSLTJ NEW/EST MOD 40: CPT | Performed by: STUDENT IN AN ORGANIZED HEALTH CARE EDUCATION/TRAINING PROGRAM

## 2021-06-07 PROCEDURE — 85025 COMPLETE CBC W/AUTO DIFF WBC: CPT

## 2021-06-07 PROCEDURE — 6370000000 HC RX 637 (ALT 250 FOR IP): Performed by: NURSE PRACTITIONER

## 2021-06-07 PROCEDURE — 80053 COMPREHEN METABOLIC PANEL: CPT

## 2021-06-07 PROCEDURE — 84439 ASSAY OF FREE THYROXINE: CPT

## 2021-06-07 PROCEDURE — 2580000003 HC RX 258: Performed by: NURSE PRACTITIONER

## 2021-06-07 PROCEDURE — G0378 HOSPITAL OBSERVATION PER HR: HCPCS

## 2021-06-07 PROCEDURE — 93010 ELECTROCARDIOGRAM REPORT: CPT | Performed by: INTERNAL MEDICINE

## 2021-06-07 PROCEDURE — 6360000002 HC RX W HCPCS: Performed by: NURSE PRACTITIONER

## 2021-06-07 PROCEDURE — 96372 THER/PROPH/DIAG INJ SC/IM: CPT

## 2021-06-07 RX ADMIN — PANTOPRAZOLE SODIUM 40 MG: 40 TABLET, DELAYED RELEASE ORAL at 05:32

## 2021-06-07 RX ADMIN — SODIUM CHLORIDE, PRESERVATIVE FREE 10 ML: 5 INJECTION INTRAVENOUS at 08:08

## 2021-06-07 RX ADMIN — ENOXAPARIN SODIUM 40 MG: 40 INJECTION SUBCUTANEOUS at 08:06

## 2021-06-07 ASSESSMENT — PAIN SCALES - GENERAL: PAINLEVEL_OUTOF10: 0

## 2021-06-07 NOTE — PLAN OF CARE
Problem:  Activity:  Goal: Ability to tolerate increased activity will improve  Description: Ability to tolerate increased activity will improve  6/7/2021 0001 by Cuauhtemoc Rogers RN  Outcome: Met This Shift  6/6/2021 1516 by Frances Calderon RN  Outcome: Ongoing  Goal: Expression of feelings of increased energy will increase  Description: Expression of feelings of increased energy will increase  6/7/2021 0001 by Cuauhtemoc Rogers RN  Outcome: Met This Shift  6/6/2021 1516 by Frances Calderon RN  Outcome: Ongoing     Problem: Cardiac:  Goal: Ability to maintain an adequate cardiac output will improve  Description: Ability to maintain an adequate cardiac output will improve  6/7/2021 0001 by Cuauhtemoc Rogers RN  Outcome: Met This Shift  6/6/2021 1516 by Frances Calderon RN  Outcome: Ongoing  Goal: Complications related to the disease process, condition or treatment will be avoided or minimized  Description: Complications related to the disease process, condition or treatment will be avoided or minimized  6/7/2021 0001 by Cuauhtemoc Rogers RN  Outcome: Met This Shift  6/6/2021 1516 by Frances Calderon RN  Outcome: Ongoing     Problem: Coping:  Goal: Level of anxiety will decrease  Description: Level of anxiety will decrease  6/7/2021 0001 by Cuauhtemoc Rogers RN  Outcome: Met This Shift  6/6/2021 1516 by Frances Calderon RN  Outcome: Ongoing  Goal: General experience of comfort will improve  Description: General experience of comfort will improve  6/7/2021 0001 by Cuauhtemoc Rogers RN  Outcome: Met This Shift  6/6/2021 1516 by Frances Calderon RN  Outcome: Ongoing

## 2021-06-07 NOTE — CARE COORDINATION
6/7/21 Transition of Care: Patient is observation due to recurrent palpatations. She is alert and oriented and independent. She resides with her  and is a . 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 She states she has been not feeling well for over a year. She did have covid in October 2020. She sees Dr Mae David in [facility] and her pharmacy [facility]. Her plan is to return home at discharge. It was stressed to her to make sure she follows with her pcp and EP as instructed and she states she understands.  Selena Colon Rn Cm

## 2021-06-07 NOTE — TELEPHONE ENCOUNTER
----- Message from Ines Mitchell DO sent at 6/7/2021  2:18 PM EDT -----  Regarding: sleep study  Hello,    Please arrange outpatient sleep study for patient.     tammy Ferrera

## 2021-06-07 NOTE — DISCHARGE SUMMARY
Hospitalist Discharge Summary    Patient ID: Fabián Ellison   Patient : 1970  Patient's PCP: Ariana Leal DO    Admit Date: 2021   Admitting Physician: Arik Nguyen DO    Discharge Date:  2021  Discharge Physician: ROSS Loaiza CNP   Discharge Condition: Stable  Discharge Disposition: Home    History of presenting illness/hospital course:    Ms. Fabián Ellison is a 46y.o. year old female  who  has a past medical history of Atrial fibrillation ([address] Prolonged emergence from general anesthesia, and Thyroid disease.      She presented to the ER on 21 with complaints of palpitations x 1 day. She was found to be in Afib with RVR and was given IVF and a cardizem bolus. She converted to NSR. Her T4 was noted to be elevated and it was recommended that she talk to her endocrinologist regarding decreasing her thyroid hormone. EP was consulted from the ED and recommended metoprolol 25 mg PO daily and to follow up as an outpatient. She was discharged. It was recommended that she make an appointment for an echocardiogram as well as follow up appointment with Dr. Vita Landau however there is no record of the echo being scheduled. She followed up with endocrine and her thyroid hormone was decreased to 88 mcg.     She stopped taking the metoprolol she was prescribed on  after just one day because she saw her cardiologist at [facility] on  and he told her that her BP was too low (systolic 556 mmHg) and that she shouldn't take it.     She presents to the ER again today, 21, with complaints of intermittent palpitations, intermittent shortness of breath and intermittent light headedness. She reported that she woke herself up in the middle of the night last night with a loud snore and that she noted her heart rate to be in the 40s. She states that she felt cold and was shivering. She is concerned that perhaps her thyroid is the cause of her presumed apnea.  She expresses many other concerns including labile blood pressures, poor exercise tolerance, pulsating above her right eye for which she had an MRI and MRA of the head and neck at [facility] (both without acute findings), her hx of goiter now \"thyroid shriveling up\", hx of receiving IV iron that \"has a ton of side effects if you google it,\" resolved anemia and ferritin levels which were elevated at one time.      Work up in the ER was unremarkable with the exception of Ddimer 646. EKG showed SB rate 59 with no ischemic changes. She stated that she does not feel comfortable going home.     She was admitted for observation to telemetry. There were no events on telemetry overnight or this morning- maintained NSR. EP saw the patient and recommended outpatient follow up and outpatient echocardiogram.    Consults:   IP CONSULT TO INTERNAL MEDICINE  IP CONSULT TO ELECTROPHYSIOLOGY    Discharge Diagnoses:      Recently diagnosed paroxysmal Afib with RVR- initial encounter on 5/31, converted to NSR after IVF and cardizem bolus. Was started on metoprolol per EP recommendation however she stopped it on 6/1 per her cardiologist at [facility] due to concerns for hypotension (systolic ). · Has maintained normal sinus rhythm while inpatient. To follow up with EP as an outpatient. Outpatient echocardiogram as well.     Hypothyroidism- follows with endocrine, continue tirosint. Management per endocrinology.     Vitamin D deficiency- supplementation as directed at home per endocrine.     Morbid obesity- Body mass index is 43.59 kg/m².     Anxiety- on ativan PRN at home    Discharge Instructions / Follow up:    No new medications. Resume home meds as prescribed. Follow up with PCP and EP within 1-2 weeks.       Activity: activity as tolerated    Significant labs:  CBC:   Recent Labs     06/06/21  0202 06/07/21  0423   WBC 6.3 5.0   RBC 4.73 4.29   HGB 14.2 13.0   HCT 44.2 40.8   MCV 93.4 95.1   RDW 13.6 13.6    243     BMP:   Recent Labs 06/06/21 0202 06/07/21 0424    140   K 3.8 4.2   CL 99 102   CO2 27 28   BUN 10 12   CREATININE 0.7 0.7   MG 2.1  --      LFT:  Recent Labs     06/06/21 0202 06/07/21 0424   PROT 7.6 6.8   ALKPHOS 97 84   ALT 11 11   AST 17 16   BILITOT 0.7 0.6     PT/INR:   Recent Labs     06/06/21 0202   INR 1.1     BNP: No results for input(s): BNP in the last 72 hours. Hgb A1C: No results found for: LABA1C  Folate and B12:   Lab Results   Component Value Date    GKYVWFAG62 700 01/03/2020   ,   Lab Results   Component Value Date    FOLATE 17.3 09/20/2018     Thyroid Studies:   Lab Results   Component Value Date    TSH 6.840 (H) 06/06/2021    K3UJBUT 82.29 06/06/2021    W2GCFYY 8.4 06/06/2021       Urinalysis:    Lab Results   Component Value Date    NITRU Negative 09/24/2016    BLOODU trace-intact 07/26/2019    BLOODU Negative 09/24/2016    SPECGRAV 1.010 07/26/2019    SPECGRAV 1.010 09/24/2016    GLUCOSEU negative 07/26/2019    GLUCOSEU Negative 09/24/2016       Imaging:  XR CHEST PORTABLE    Result Date: 6/6/2021  EXAMINATION: ONE XRAY VIEW OF THE CHEST 6/6/2021 4:44 am COMPARISON: 05/31/2021 portable chest. HISTORY: ORDERING SYSTEM PROVIDED HISTORY: Palpitations TECHNOLOGIST PROVIDED HISTORY: Reason for exam:->Palpitations What reading provider will be dictating this exam?->CRC FINDINGS: Normal cardiomediastinal silhouette and pulmonary vasculature. No pneumothorax, pleural effusion or consolidative focal pneumonia. Stable mild relative elevation/eventration of the right hemidiaphragm. Stable calcified granuloma at the left margin of the aortic knob. Lungs appear otherwise clear bilaterally. Negative single view chest for acute process.      XR CHEST PORTABLE    Result Date: 5/31/2021  EXAMINATION: ONE XRAY VIEW OF THE CHEST 5/31/2021 9:37 am COMPARISON: January 2, 2020 HISTORY: ORDERING SYSTEM PROVIDED HISTORY: palpitations TECHNOLOGIST PROVIDED HISTORY: Reason for exam:->palpitations What reading provider will be dictating this exam?->CRC FINDINGS: Lungs are clear, there are no infiltrates, consolidates or pleural effusions. The heart is normal size, mediastinum appears unremarkable. There is no perihilar vascular congestion. No acute cardiopulmonary process. Discharge Medications:      Medication List      ASK your doctor about these medications    LORazepam 0.5 MG tablet  Commonly known as: ATIVAN     metoprolol tartrate 25 MG tablet  Commonly known as: LOPRESSOR     omeprazole 20 MG delayed release capsule  Commonly known as: PRILOSEC     ondansetron 8 MG Tbdp disintegrating tablet  Commonly known as: ZOFRAN-ODT     Tirosint 88 MCG Caps  Generic drug: Levothyroxine Sodium  Take 1 capsule daily            Time Spent on discharge is more than 35 minutes in the examination, evaluation, counseling and review of medications and discharge plan.    +++++++++++++++++++++++++++++++++++++++++++++++++  ROSS Lennon/ Goran [address]  +++++++++++++++++++++++++++++++++++++++++++++++++  NOTE: This report was transcribed using voice recognition software. Every effort was made to ensure accuracy; however, inadvertent computerized transcription errors may be present.

## 2021-06-07 NOTE — TELEPHONE ENCOUNTER
----- Message from ROSS Quintana CNP sent at 6/7/2021 12:09 PM EDT -----  Manuel Falcon was seen inpatient, she is to follow-up with Dr. Emeli Quijano, and needs a 14 day Zio XT perferably placed inpatient, [place] work-up with either Dr. Kevon Tbubs or sleep medicine.

## 2021-06-07 NOTE — CONSULTS
confusion and agitation. The patient is not nervous/anxious. Heme: no bleeding disorders, no melena or hematochezia  All other review of systems are negative     PHYSICAL EXAM:  Vitals:    06/06/21 1515 06/06/21 2145 06/07/21 0530 06/07/21 0740   BP: 132/81 (!) 140/82 124/62 111/75   Pulse: 63 64 52 74   Resp: 16 16 16 18   Temp: 96.5 °F (35.8 °C) 97.7 °F (36.5 °C) 97.4 °F (36.3 °C) 98.2 °F (36.8 °C)   TempSrc: Temporal Temporal Temporal Oral   SpO2: 95% 97% 97% 97%   Weight:       Height:       Limited exam due to COVID-19 pandemic. Constitutional: NAD, obese  Head: Normocephalic and atraumatic. Neck: supple and no JVD present  Cardiovascular: RRR  Pulmonary/Chest:  No respiratory distress, no audible wheeze  Abdominal: nondistended  Musculoskeletal: Normal range of motion of all extremities  Neurological: Alert & O x 3, grossly intact  Skin: Skin is warm and dry  Extremity: no edema   Psychiatric: Normal mood and affect, A&O x3    Data:    Recent Labs     06/06/21  0202 06/07/21  0423   WBC 6.3 5.0   HGB 14.2 13.0   HCT 44.2 40.8    243     Recent Labs     06/06/21  0202 06/07/21  0424    140   K 3.8 4.2   CL 99 102   CO2 27 28   BUN 10 12   CREATININE 0.7 0.7   CALCIUM 9.5 9.3     Recent Labs     06/06/21  0202   INR 1.1     Recent Labs     06/06/21  0202   TSH 6.840*     Lab Results   Component Value Date    MG 2.1 06/06/2021   Telemetry: sinus rhythm with HR = 55 -74 bpm    Assessment/Plan:  1. Paroxysmal Atrial Fibrillation  -Recommend TTE as outpatient to evaluate cardiac structure and function, particularly MV status. She had a nuclear stress test on 1/2020, which was normal.  -CHADSVASC = 2 (HTN, female). She is very concerned about strokes as her mother recently passed away after a stroke. Recommend apixaban 5 mg every 12 hours. -Recommend metoprolol 25 mg every AM and 12.5 mg every evening. She is concerned about asymptomatic resting bradycardia at night.  I counseled her that this is not an issue, but will reduce evening dose to avoid. She can take an additional dose of metoprolol in the future if she has a recurrence of AF with elevated HR. -I reviewed modifiable risk factors:  --Obesity: she reports significant weight gain over the past year related to stress from child, but recently changed diet and reports ~10 lb weight loss. Recommend diet and exercise. --CLINTON: she reports snoring and apneic episodes. I recommend outpatient sleep study.  --HTN: BP goal < 130/80 mmHg. She reports BP has been increased lately since she gained weight. Recommend compliance with medication therapy. --EtOH: she reports no EtOH use. -Recommend outpatient follow-up with EP. She has an appointment on 6/9/21 with Dr Amaury Mitchell. 2. Hypothyroidism  -Management per Endo. I spent a total of 55 minutes reviewing previous notes, test results, and face to face with the patient discussing the diagnosis and importance of compliance with the treatment plan as well as documenting on the day of the visit. Time of the day of service includes:  · Preparing to see the patient (eg. Review of the medical record, such as tests). · Obtaining and/or reviewing separately obtained history. · Ordering prescription medications, tests, and/or procedures. · Communicating results to the patient/family/caregiver. · Counseling/educating the patient/family/caregiver. · Documenting clinical information in the patients electronic record. · Coordination of care for the patient. · Performing a medical appropriate exam and/or evaluation. Thank you for allowing me to participate in your patient's care. Please call me if there are any questions. Faustina Jordan D.O.   Cardiac Electrophysiology  [address] [place] [address]

## 2021-06-07 NOTE — PLAN OF CARE
Applied Zio XT monitor per Kezia Varela NP. Patient tolerated well.   Serial number [id number] - S Jitendra EKG technician

## 2021-06-08 ENCOUNTER — TELEPHONE (OUTPATIENT)
Dept: NON INVASIVE DIAGNOSTICS | Age: 51
End: 2021-06-08

## 2021-06-08 DIAGNOSIS — R00.2 PALPITATIONS: Primary | ICD-10-CM

## 2021-06-08 NOTE — TELEPHONE ENCOUNTER
----- Message from Sophia Stevens DO sent at 6/7/2021  2:17 PM EDT -----  Regarding: echo  Hello,    Please arrange outpatient echo for patient.     Kylie Pruitt

## 2021-06-08 NOTE — TELEPHONE ENCOUNTER
This patient was originally scheduled w/ you as a new patient, but she ended up in the hospital and TB was consulted for PAF. She is currently wearing a 14 day Zio XT. She called this morning and states her heart rate on her apple watch read 34 bpm. She is suppose to take metoprolol tart 25mg qam and 12.5mg qhs. She is concerned about taking it.  Please advise

## 2021-06-09 ENCOUNTER — OFFICE VISIT (OUTPATIENT)
Dept: NON INVASIVE DIAGNOSTICS | Age: 51
End: 2021-06-09
Payer: COMMERCIAL

## 2021-06-09 VITALS
OXYGEN SATURATION: 95 % | SYSTOLIC BLOOD PRESSURE: 120 MMHG | WEIGHT: 250.2 LBS | RESPIRATION RATE: 16 BRPM | BODY MASS INDEX: 42.72 KG/M2 | HEART RATE: 66 BPM | DIASTOLIC BLOOD PRESSURE: 78 MMHG | HEIGHT: 64 IN

## 2021-06-09 DIAGNOSIS — Z86.39 H/O THYROID NODULE: ICD-10-CM

## 2021-06-09 DIAGNOSIS — I48.0 PAROXYSMAL ATRIAL FIBRILLATION (HCC): ICD-10-CM

## 2021-06-09 DIAGNOSIS — Z98.84 H/O GASTRIC BYPASS: ICD-10-CM

## 2021-06-09 DIAGNOSIS — E66.01 MORBID OBESITY (HCC): ICD-10-CM

## 2021-06-09 DIAGNOSIS — R00.2 PALPITATIONS: Primary | ICD-10-CM

## 2021-06-09 DIAGNOSIS — R07.2 PRECORDIAL CHEST PAIN: ICD-10-CM

## 2021-06-09 PROBLEM — E55.9 VITAMIN D DEFICIENCY: Status: ACTIVE | Noted: 2017-11-27

## 2021-06-09 PROBLEM — F41.9 ANXIETY: Status: ACTIVE | Noted: 2017-08-21

## 2021-06-09 PROBLEM — N95.1 PERIMENOPAUSE: Status: ACTIVE | Noted: 2017-08-21

## 2021-06-09 PROBLEM — R94.31 ELECTROCARDIOGRAM ABNORMAL: Status: ACTIVE | Noted: 2021-06-09

## 2021-06-09 PROBLEM — R14.0 BLOATING SYMPTOM: Status: ACTIVE | Noted: 2017-11-27

## 2021-06-09 PROBLEM — E53.9 B-COMPLEX DEFICIENCY: Status: ACTIVE | Noted: 2017-11-27

## 2021-06-09 PROBLEM — M79.7 FIBROMYALGIA: Status: ACTIVE | Noted: 2017-11-27

## 2021-06-09 PROBLEM — R06.09 DYSPNEA ON EXERTION: Status: ACTIVE | Noted: 2017-03-13

## 2021-06-09 PROBLEM — R19.5 YEAST IN STOOL: Status: ACTIVE | Noted: 2017-11-27

## 2021-06-09 PROBLEM — K86.89 PANCREATIC INSUFFICIENCY: Status: ACTIVE | Noted: 2017-11-27

## 2021-06-09 PROBLEM — Z15.89 COMPOUND HETEROZYGOUS MTHFR MUTATION C677T/A1298C: Status: ACTIVE | Noted: 2017-11-27

## 2021-06-09 PROCEDURE — 93000 ELECTROCARDIOGRAM COMPLETE: CPT | Performed by: INTERNAL MEDICINE

## 2021-06-09 PROCEDURE — 99205 OFFICE O/P NEW HI 60 MIN: CPT | Performed by: INTERNAL MEDICINE

## 2021-06-09 RX ORDER — CLOBETASOL PROPIONATE 0.5 MG/G
OINTMENT TOPICAL
Status: ON HOLD | COMMUNITY
End: 2021-07-06 | Stop reason: HOSPADM

## 2021-06-09 ASSESSMENT — ENCOUNTER SYMPTOMS
SINUS PRESSURE: 0
DIARRHEA: 0
EYE REDNESS: 0
WHEEZING: 0
COUGH: 0
ABDOMINAL PAIN: 0
CHEST TIGHTNESS: 0
SHORTNESS OF BREATH: 0
ABDOMINAL DISTENTION: 0
NAUSEA: 0
COLOR CHANGE: 0

## 2021-06-10 ENCOUNTER — TELEPHONE (OUTPATIENT)
Dept: ENDOCRINOLOGY | Age: 51
End: 2021-06-10

## 2021-06-10 NOTE — TELEPHONE ENCOUNTER
The pt was in the ER again for hypertension. She did not have a-fib. Her TSH was underfunctioning this time. She did miss two doses of Tirosint. I spoke to Dr Gloria Childs.  He would like her to wait a week, and then get her TFTs rechecked 1st thing in the am. The pt was notified

## 2021-07-01 ENCOUNTER — TELEPHONE (OUTPATIENT)
Dept: ADMINISTRATIVE | Age: 51
End: 2021-07-01

## 2021-07-02 ENCOUNTER — TELEPHONE (OUTPATIENT)
Dept: ENDOCRINOLOGY | Age: 51
End: 2021-07-02

## 2021-07-02 DIAGNOSIS — E03.9 HYPOTHYROIDISM, UNSPECIFIED TYPE: ICD-10-CM

## 2021-07-02 DIAGNOSIS — E03.9 HYPOTHYROIDISM, UNSPECIFIED TYPE: Primary | ICD-10-CM

## 2021-07-02 RX ORDER — LEVOTHYROXINE SODIUM 112 UG/1
CAPSULE ORAL
Qty: 30 CAPSULE | Refills: 11 | Status: SHIPPED
Start: 2021-07-02 | End: 2021-07-02 | Stop reason: SDUPTHER

## 2021-07-02 RX ORDER — LEVOTHYROXINE SODIUM 112 UG/1
CAPSULE ORAL
Qty: 30 CAPSULE | Refills: 11 | Status: SHIPPED | OUTPATIENT
Start: 2021-07-02 | End: 2021-07-14 | Stop reason: DRUGHIGH

## 2021-07-02 NOTE — TELEPHONE ENCOUNTER
Patient needs this prescription sent to [facility] pharmacy. She also requests the orders be faxed to [facility] [place] at [phone]. This has been done.

## 2021-07-02 NOTE — TELEPHONE ENCOUNTER
Thyroid hormones are still low.  Change Tirosn from 88 to 112 mcg daily and repeat labs again in 6-8 weeks

## 2021-07-02 NOTE — TELEPHONE ENCOUNTER
Left the patient a detailed voicemail letting her know of the drs response and requested a call back with any questions or concerns

## 2021-07-02 NOTE — TELEPHONE ENCOUNTER
The patient called in regarding her labs she got done at [address] [place] [address]. Her TSH has jumped to 13.6 , these are available in Care everywhere. She was also wondering if this would mean a change in her tirosint. If so she will need this sent in. Message also left on Drs desk.

## 2021-07-05 ENCOUNTER — APPOINTMENT (OUTPATIENT)
Dept: ULTRASOUND IMAGING | Age: 51
End: 2021-07-05
Payer: COMMERCIAL

## 2021-07-05 ENCOUNTER — HOSPITAL ENCOUNTER (OUTPATIENT)
Age: 51
Setting detail: OBSERVATION
Discharge: HOME | End: 2021-07-08
Attending: EMERGENCY MEDICINE | Admitting: INTERNAL MEDICINE
Payer: COMMERCIAL

## 2021-07-05 ENCOUNTER — APPOINTMENT (OUTPATIENT)
Dept: CT IMAGING | Age: 51
End: 2021-07-05
Payer: COMMERCIAL

## 2021-07-05 ENCOUNTER — APPOINTMENT (OUTPATIENT)
Dept: GENERAL RADIOLOGY | Age: 51
End: 2021-07-05
Payer: COMMERCIAL

## 2021-07-05 DIAGNOSIS — R20.2 PARESTHESIAS: Primary | ICD-10-CM

## 2021-07-05 LAB
ALBUMIN SERPL-MCNC: 4.5 G/DL (ref 3.5–5.2)
ALP BLD-CCNC: 108 U/L (ref 35–104)
ALT SERPL-CCNC: 13 U/L (ref 0–32)
ANION GAP SERPL CALCULATED.3IONS-SCNC: 11 MMOL/L (ref 7–16)
AST SERPL-CCNC: 20 U/L (ref 0–31)
BASOPHILS ABSOLUTE: 0.05 E9/L (ref 0–0.2)
BASOPHILS RELATIVE PERCENT: 0.7 % (ref 0–2)
BILIRUB SERPL-MCNC: 0.5 MG/DL (ref 0–1.2)
BILIRUBIN URINE: NEGATIVE
BLOOD, URINE: NEGATIVE
BUN BLDV-MCNC: 9 MG/DL (ref 6–20)
CALCIUM SERPL-MCNC: 9.4 MG/DL (ref 8.6–10.2)
CHLORIDE BLD-SCNC: 101 MMOL/L (ref 98–107)
CLARITY: CLEAR
CO2: 29 MMOL/L (ref 22–29)
COLOR: YELLOW
CREAT SERPL-MCNC: 0.7 MG/DL (ref 0.5–1)
EOSINOPHILS ABSOLUTE: 0.17 E9/L (ref 0.05–0.5)
EOSINOPHILS RELATIVE PERCENT: 2.4 % (ref 0–6)
FOLATE: 12 NG/ML (ref 4.8–24.2)
GFR AFRICAN AMERICAN: >60
GFR NON-AFRICAN AMERICAN: >60 ML/MIN/1.73
GLUCOSE BLD-MCNC: 104 MG/DL (ref 74–99)
GLUCOSE URINE: NEGATIVE MG/DL
HCT VFR BLD CALC: 43.5 % (ref 34–48)
HEMOGLOBIN: 14.1 G/DL (ref 11.5–15.5)
IMMATURE GRANULOCYTES #: 0.03 E9/L
IMMATURE GRANULOCYTES %: 0.4 % (ref 0–5)
KETONES, URINE: NEGATIVE MG/DL
LEUKOCYTE ESTERASE, URINE: NEGATIVE
LYMPHOCYTES ABSOLUTE: 1.39 E9/L (ref 1.5–4)
LYMPHOCYTES RELATIVE PERCENT: 19.6 % (ref 20–42)
MCH RBC QN AUTO: 30.6 PG (ref 26–35)
MCHC RBC AUTO-ENTMCNC: 32.4 % (ref 32–34.5)
MCV RBC AUTO: 94.4 FL (ref 80–99.9)
MONOCYTES ABSOLUTE: 0.34 E9/L (ref 0.1–0.95)
MONOCYTES RELATIVE PERCENT: 4.8 % (ref 2–12)
NEUTROPHILS ABSOLUTE: 5.11 E9/L (ref 1.8–7.3)
NEUTROPHILS RELATIVE PERCENT: 72.1 % (ref 43–80)
NITRITE, URINE: NEGATIVE
PDW BLD-RTO: 14.4 FL (ref 11.5–15)
PH UA: 6 (ref 5–9)
PLATELET # BLD: 291 E9/L (ref 130–450)
PMV BLD AUTO: 10.7 FL (ref 7–12)
POTASSIUM REFLEX MAGNESIUM: 4.2 MMOL/L (ref 3.5–5)
PROTEIN UA: NEGATIVE MG/DL
RBC # BLD: 4.61 E12/L (ref 3.5–5.5)
SODIUM BLD-SCNC: 141 MMOL/L (ref 132–146)
SPECIFIC GRAVITY UA: <=1.005 (ref 1–1.03)
TOTAL PROTEIN: 7.8 G/DL (ref 6.4–8.3)
TROPONIN, HIGH SENSITIVITY: <6 NG/L (ref 0–9)
UROBILINOGEN, URINE: 0.2 E.U./DL
VITAMIN B-12: 416 PG/ML (ref 211–946)
WBC # BLD: 7.1 E9/L (ref 4.5–11.5)

## 2021-07-05 PROCEDURE — 99284 EMERGENCY DEPT VISIT MOD MDM: CPT

## 2021-07-05 PROCEDURE — 70450 CT HEAD/BRAIN W/O DYE: CPT

## 2021-07-05 PROCEDURE — 82746 ASSAY OF FOLIC ACID SERUM: CPT

## 2021-07-05 PROCEDURE — 93880 EXTRACRANIAL BILAT STUDY: CPT

## 2021-07-05 PROCEDURE — 6360000002 HC RX W HCPCS: Performed by: STUDENT IN AN ORGANIZED HEALTH CARE EDUCATION/TRAINING PROGRAM

## 2021-07-05 PROCEDURE — 96374 THER/PROPH/DIAG INJ IV PUSH: CPT

## 2021-07-05 PROCEDURE — G0378 HOSPITAL OBSERVATION PER HR: HCPCS

## 2021-07-05 PROCEDURE — 84484 ASSAY OF TROPONIN QUANT: CPT

## 2021-07-05 PROCEDURE — 81003 URINALYSIS AUTO W/O SCOPE: CPT

## 2021-07-05 PROCEDURE — 2580000003 HC RX 258: Performed by: INTERNAL MEDICINE

## 2021-07-05 PROCEDURE — 82607 VITAMIN B-12: CPT

## 2021-07-05 PROCEDURE — 93005 ELECTROCARDIOGRAM TRACING: CPT | Performed by: STUDENT IN AN ORGANIZED HEALTH CARE EDUCATION/TRAINING PROGRAM

## 2021-07-05 PROCEDURE — 85025 COMPLETE CBC W/AUTO DIFF WBC: CPT

## 2021-07-05 PROCEDURE — 71045 X-RAY EXAM CHEST 1 VIEW: CPT

## 2021-07-05 PROCEDURE — 36415 COLL VENOUS BLD VENIPUNCTURE: CPT

## 2021-07-05 PROCEDURE — 80053 COMPREHEN METABOLIC PANEL: CPT

## 2021-07-05 PROCEDURE — 93880 EXTRACRANIAL BILAT STUDY: CPT | Performed by: RADIOLOGY

## 2021-07-05 PROCEDURE — 6370000000 HC RX 637 (ALT 250 FOR IP): Performed by: INTERNAL MEDICINE

## 2021-07-05 RX ORDER — LORAZEPAM 0.5 MG/1
0.5 TABLET ORAL 2 TIMES DAILY
Status: DISCONTINUED | OUTPATIENT
Start: 2021-07-05 | End: 2021-07-07

## 2021-07-05 RX ORDER — ASPIRIN 300 MG/1
300 SUPPOSITORY RECTAL DAILY
Status: DISCONTINUED | OUTPATIENT
Start: 2021-07-05 | End: 2021-07-08 | Stop reason: HOSPADM

## 2021-07-05 RX ORDER — METOPROLOL SUCCINATE 25 MG/1
25 TABLET, EXTENDED RELEASE ORAL EVERY EVENING
Status: DISCONTINUED | OUTPATIENT
Start: 2021-07-05 | End: 2021-07-08 | Stop reason: HOSPADM

## 2021-07-05 RX ORDER — LEVOTHYROXINE SODIUM 112 UG/1
112 TABLET ORAL
Status: DISCONTINUED | OUTPATIENT
Start: 2021-07-06 | End: 2021-07-07 | Stop reason: SDUPTHER

## 2021-07-05 RX ORDER — SODIUM CHLORIDE 0.9 % (FLUSH) 0.9 %
5-40 SYRINGE (ML) INJECTION PRN
Status: DISCONTINUED | OUTPATIENT
Start: 2021-07-05 | End: 2021-07-08 | Stop reason: HOSPADM

## 2021-07-05 RX ORDER — ONDANSETRON 2 MG/ML
4 INJECTION INTRAMUSCULAR; INTRAVENOUS EVERY 6 HOURS PRN
Status: DISCONTINUED | OUTPATIENT
Start: 2021-07-05 | End: 2021-07-08 | Stop reason: HOSPADM

## 2021-07-05 RX ORDER — ATORVASTATIN CALCIUM 40 MG/1
40 TABLET, FILM COATED ORAL NIGHTLY
Status: DISCONTINUED | OUTPATIENT
Start: 2021-07-05 | End: 2021-07-08 | Stop reason: HOSPADM

## 2021-07-05 RX ORDER — POLYETHYLENE GLYCOL 3350 17 G/17G
17 POWDER, FOR SOLUTION ORAL DAILY PRN
Status: DISCONTINUED | OUTPATIENT
Start: 2021-07-05 | End: 2021-07-08 | Stop reason: HOSPADM

## 2021-07-05 RX ORDER — PANTOPRAZOLE SODIUM 40 MG/1
40 TABLET, DELAYED RELEASE ORAL
Status: DISCONTINUED | OUTPATIENT
Start: 2021-07-06 | End: 2021-07-08 | Stop reason: HOSPADM

## 2021-07-05 RX ORDER — ASPIRIN 81 MG/1
81 TABLET ORAL DAILY
Status: DISCONTINUED | OUTPATIENT
Start: 2021-07-05 | End: 2021-07-08 | Stop reason: HOSPADM

## 2021-07-05 RX ORDER — SODIUM CHLORIDE 9 MG/ML
25 INJECTION, SOLUTION INTRAVENOUS PRN
Status: DISCONTINUED | OUTPATIENT
Start: 2021-07-05 | End: 2021-07-08 | Stop reason: HOSPADM

## 2021-07-05 RX ORDER — LORAZEPAM 2 MG/ML
0.5 INJECTION INTRAMUSCULAR ONCE
Status: COMPLETED | OUTPATIENT
Start: 2021-07-05 | End: 2021-07-05

## 2021-07-05 RX ORDER — SODIUM CHLORIDE 0.9 % (FLUSH) 0.9 %
5-40 SYRINGE (ML) INJECTION EVERY 12 HOURS SCHEDULED
Status: DISCONTINUED | OUTPATIENT
Start: 2021-07-05 | End: 2021-07-08 | Stop reason: HOSPADM

## 2021-07-05 RX ORDER — ONDANSETRON 4 MG/1
4 TABLET, ORALLY DISINTEGRATING ORAL EVERY 8 HOURS PRN
Status: DISCONTINUED | OUTPATIENT
Start: 2021-07-05 | End: 2021-07-08 | Stop reason: HOSPADM

## 2021-07-05 RX ADMIN — Medication 10 ML: at 22:19

## 2021-07-05 RX ADMIN — APIXABAN 5 MG: 5 TABLET, FILM COATED ORAL at 22:18

## 2021-07-05 RX ADMIN — LORAZEPAM 0.5 MG: 2 INJECTION INTRAMUSCULAR; INTRAVENOUS at 13:03

## 2021-07-05 ASSESSMENT — ENCOUNTER SYMPTOMS
VOMITING: 0
RHINORRHEA: 0
COUGH: 0
BACK PAIN: 0
SORE THROAT: 0
DIARRHEA: 0
NAUSEA: 0
ABDOMINAL PAIN: 0
SHORTNESS OF BREATH: 0

## 2021-07-05 ASSESSMENT — PAIN SCALES - GENERAL
PAINLEVEL_OUTOF10: 0
PAINLEVEL_OUTOF10: 0
PAINLEVEL_OUTOF10: 3

## 2021-07-05 ASSESSMENT — PAIN DESCRIPTION - ORIENTATION: ORIENTATION: RIGHT

## 2021-07-05 ASSESSMENT — PAIN DESCRIPTION - PAIN TYPE: TYPE: ACUTE PAIN

## 2021-07-05 ASSESSMENT — PAIN DESCRIPTION - LOCATION: LOCATION: HEAD

## 2021-07-05 NOTE — H&P
BIOPSY performed by Mary Paulson MD at [address] [facility]       Medications Prior to Admission:      Prior to Admission medications    Medication Sig Start Date End Date Taking? Authorizing Provider   apixaban (ELIQUIS) 5 MG TABS tablet Take 1 tablet by mouth 2 times daily 6/7/21  Yes Jesus Rider APRALICIA Casey CNP   TIROSINT 112 MCG CAPS Take 1 capsule daily 7/2/21   Fabio Espino MD   clobetasol (TEMOVATE) 0.05 % ointment clobetasol 0.05 % topical ointment   apply to affected area twice a day    Historical Provider, MD   metoprolol tartrate (LOPRESSOR) 25 MG tablet Take 0.5 tablets by mouth every evening  Patient not taking: Reported on 6/9/2021 6/7/21   Jesus Rider APRALICIA Casey CNP   omeprazole (PRILOSEC) 20 MG delayed release capsule Take 20 mg by mouth daily    Historical Provider, MD   LORazepam (ATIVAN) 0.5 MG tablet lorazepam 0.5 mg tablet    Historical Provider, MD       Allergies: Avelox [moxifloxacin hcl in nacl], Cephalexin, Ciprofloxacin, Clindamycin, Coffee bean extract [coffea arabica], Magnesium sulfate, Penicillins, Percocet [oxycodone-acetaminophen], and Sulfa antibiotics    Social History:        TOBACCO:   reports that she has never smoked. She has never used smokeless tobacco.  ETOH:   reports no history of alcohol use. Family History:      pt denies contributory history     REVIEW OF SYSTEMS:   Pertinent positives as noted in the HPI. All other systems reviewed and negative. PHYSICAL EXAM:    /70   Pulse 71   Temp 97.6 °F (36.4 °C)   Resp 16   Wt 250 lb (113.4 kg)   SpO2 98%   BMI 43.59 kg/m²     General appearance:  No apparent distress, appears stated age and cooperative. HEENT:  Normal cephalic, atraumatic without obvious deformity. Pupils equal, round, and reactive to light. Extra ocular muscles intact. Conjunctivae/corneas clear. Neck: Supple, with full range of motion. No jugular venous distention. Trachea midline. Respiratory:  Normal respiratory effort. Clear to auscultation, bilaterally without Rales/Wheezes/Rhonchi. Cardiovascular:  Regular rate and rhythm with normal S1/S2 without murmurs, rubs or gallops. Abdomen: Soft, non-tender, non-distended with normal bowel sounds. Musculoskeletal:  No clubbing, cyanosis or edema bilaterally. Full range of motion without deformity. Skin: Skin color, texture, turgor normal.  No rashes or lesions. Neurologic:  Neurovascularly intact without any focal sensory/motor deficits. Cranial nerves: II-XII intact, grossly non-focal.  Psychiatric:  Alert and oriented, thought content appropriate, normal insight    CXR:  I have reviewed the CXR with the following interpretation: NAD  EKG:  I have reviewed the EKG with the following interpretation: Normal sinus rhythm  CT head negative  Labs:     Recent Labs     07/05/21  1238   WBC 7.1   HGB 14.1   HCT 43.5        Recent Labs     07/05/21  1238      K 4.2      CO2 29   BUN 9   CREATININE 0.7   CALCIUM 9.4     Recent Labs     07/05/21  1238   AST 20   ALT 13   BILITOT 0.5   ALKPHOS 108*     No results for input(s): INR in the last 72 hours. No results for input(s): Gunnar Breeze in the last 72 hours.     Urinalysis:      Lab Results   Component Value Date    NITRU Negative 07/05/2021    BLOODU Negative 07/05/2021    SPECGRAV <=1.005 07/05/2021    GLUCOSEU Negative 07/05/2021         ASSESSMENT:    Active Hospital Problems    Diagnosis Date Noted    Paresthesias [R20.2] 07/05/2021    Hypothyroidism [E03.9] 10/02/2020    Fibromyalgia [M79.7] 11/27/2017    Anxiety [F41.9] 08/21/2017    Morbid obesity ([facility] [address] [E66.01] 11/18/2004       PLAN:    Observation tele for paresthesias possible TIA versus anxiety  Asa/apixaban  Statin  MRI/A  carotid US  Neuro cs    Hypothyroidism continue levothyroxine check TSH    Morbid obesity   weight loss through healthy diet and increased activity        PAF  Normal sinus rhythm on EKG  Continue apixaban  Continue metoprolol    DVT Prophylaxis: Apixaban  Diet: No diet orders on file regular  Code Status: Prior    PT/OT Eval pending  Status: Full    Dispo -likely home tomorrow       Ivett Nicole MD    Thank you Oumar Breen DO for the opportunity to be involved in this patient's care. If you have any questions or concerns please feel free to contact me through [facility].   Electronically signed by Ivett Nicole MD on 7/5/2021 at 4:02 PM

## 2021-07-05 NOTE — ED PROVIDER NOTES
[facility] [address]  Department of Emergency Medicine     Written by: Gael Hodges DO  Patient Name: Enmanuel Werner  Attending Provider: Emely Ring MD  Admit Date: 2021 12:07 PM  MRN: [id number]                   : 1970        Chief Complaint   Patient presents with    Numbness     Pt states she was in Afib this morning and then her right shoulder and face started started becoming tingly. The feelings comes and goes. During triage pt felt like she could not talk. - Chief complaint    HPI     Patient is a 30-year-old female with past medical history of presents the ED due to multiple chief complaints including \"tingling\" of her right face, right neck, right shoulder, and right upper back that has been ongoing since about 8:00 AM today; sensation of difficulty swallowing which is now resolved; \"twitching\" of her right shoulder; and increased urination. Complaints are moderate in severity, no specific aggravating or alleviating factors. Denies any recent illnesses, fevers, neck pain or stiffness, cough or sore throat, chest pain or palpitations, shortness of breath, abdominal pain, nausea or vomiting, diarrhea, black or bloody stools, urinary symptoms, numbness or tingling anywhere, lower extremity edema or tenderness, motor deficits or confusion. Patient denies any actual numbness, her symptoms are described as paresthesias. Review of Systems   Constitutional: Negative for chills and fever. HENT: Negative for rhinorrhea and sore throat. Eyes: Negative for visual disturbance. Respiratory: Negative for cough and shortness of breath. Cardiovascular: Negative for chest pain and palpitations. Gastrointestinal: Negative for abdominal pain, diarrhea, nausea and vomiting. Endocrine: Negative for polydipsia and polyuria. Genitourinary: Negative for dysuria and frequency. Musculoskeletal: Negative for back pain and myalgias.         Right shoulder \"twitching\" Skin: Negative for rash and wound. Neurological: Negative for weakness and headaches. Parasthesias of right face, right shoulder, right neck, right upper back   Psychiatric/Behavioral: Negative for confusion. All other systems reviewed and are negative. Physical Exam  Vitals and nursing note reviewed. Constitutional:       General: She is not in acute distress. Appearance: She is not ill-appearing. HENT:      Head: Normocephalic and atraumatic. Right Ear: External ear normal.      Left Ear: External ear normal.      Nose: Nose normal. No rhinorrhea. Mouth/Throat:      Mouth: Mucous membranes are moist.      Pharynx: Oropharynx is clear. Eyes:      Extraocular Movements: Extraocular movements intact. Conjunctiva/sclera: Conjunctivae normal.      Pupils: Pupils are equal, round, and reactive to light. Cardiovascular:      Rate and Rhythm: Normal rate and regular rhythm. Pulses: Normal pulses. Heart sounds: Normal heart sounds. Pulmonary:      Effort: Pulmonary effort is normal. No respiratory distress. Breath sounds: Normal breath sounds. No wheezing or rales. Abdominal:      General: Bowel sounds are normal.      Palpations: Abdomen is soft. Tenderness: There is no abdominal tenderness. There is no guarding. Musculoskeletal:         General: No tenderness. Normal range of motion. Cervical back: Normal range of motion and neck supple. No rigidity or tenderness. Right lower leg: No edema. Left lower leg: No edema. Skin:     General: Skin is warm and dry. Capillary Refill: Capillary refill takes less than 2 seconds. Coloration: Skin is not jaundiced or pale. Neurological:      General: No focal deficit present. Mental Status: She is alert and oriented to person, place, and time. Cranial Nerves: No cranial nerve deficit. Sensory: No sensory deficit. Motor: No weakness.       Coordination: Coordination normal.   Psychiatric:         Mood and Affect: Mood is anxious. Behavior: Behavior normal.          Procedures       MDM     Presents due to paresthesias of right face, right neck, right upper back, and right shoulder; as well as \"twitching\" of right shoulder. Vitals stable on arrival, patient does appear slightly anxious and is intermittently tearful when describing her symptoms. NIHSS is 0, patient does not appear to have any motor or sensory deficits. Every now and then during the examination patient's right shoulder is moving slightly, states that she cannot control this, though her arm itself does stay still and functions normally. Work-up including CT head, CXR, EKG, and labs were reviewed; no significant abnormalities were noted. Patient was given 0.5 mg IV Ativan with improvement of symptoms, did state during this encounter that she persistently had the right shoulder \"twitch. \"  Decision to made to admit patient due to paresthesias and for further evaluation by neurology for possible TIA symptoms. Discussed results and plan with the patient, she is amenable and her questions are answered. I have discussed this patient with my attending, who has seen the patient and agrees with this disposition. Patient was seen and evaluated by myself and my attending Veronique Echols MD. Assessment and Plan discussed with attending provider, please see attestation for final plan of care. ED Course as of Jul 05 1654 Mon Jul 05, 2021   1230 NIHSS is zero; patient alert and oriented, intermittently tearful and anxious. [VG]   1896 Patient reassessed, states that her tingling symptoms have improved. States that her shoulder still twitches intermittently. [VG]   2183 AOWJF with neurology, discussed case, they agree to provide inpatient consultation for this patient.     [VG]   2879 EKG interpreted by the emergency department physician:    Rate is 71; rate is sinus; interpretation is NSR with sinus arrhythmia,  ms, QRS 94 ms,  ms, no ST elevations, no abnormal T wave inversions; stable as compared to most recent EKG.    [VG]   030 Spoke with Dr. Sohan Ortega, discussed case, this patient is accepted for admission.    [VG]      ED Course User Index  [VG] Gilberto Damico, DO       --------------------------------------------- PAST HISTORY ---------------------------------------------  Past Medical History:  has a past medical history of Atrial fibrillation ([facility] [address] Obesity, Prolonged emergence from general anesthesia, and Thyroid disease. Past Surgical History:  has a past surgical history that includes Cholecystectomy; Tubal ligation; Gastric bypass surgery;  section; hernia repair; Hysterectomy; Abdomen surgery; Upper gastrointestinal endoscopy (N/A, 2020); and Colonoscopy (N/A, 2020). Social History:  reports that she has never smoked. She has never used smokeless tobacco. She reports that she does not drink alcohol and does not use drugs. Family History: family history is not on file. The patients home medications have been reviewed. Allergies:  Avelox [moxifloxacin hcl in nacl], Cephalexin, Ciprofloxacin, Clindamycin, Coffee bean extract [coffea arabica], Magnesium sulfate, Penicillins, Percocet [oxycodone-acetaminophen], and Sulfa antibiotics    -------------------------------------------------- RESULTS -------------------------------------------------    LABS:  Results for orders placed or performed during the hospital encounter of 21   CBC Auto Differential   Result Value Ref Range    WBC 7.1 4.5 - 11.5 E9/L    RBC 4.61 3.50 - 5.50 E12/L    Hemoglobin 14.1 11.5 - 15.5 g/dL    Hematocrit 43.5 34.0 - 48.0 %    MCV 94.4 80.0 - 99.9 fL    MCH 30.6 26.0 - 35.0 pg    MCHC 32.4 32.0 - 34.5 %    RDW 14.4 11.5 - 15.0 fL    Platelets 154 157 - 481 E9/L    MPV 10.7 7.0 - 12.0 fL    Neutrophils % 72.1 43.0 - 80.0 %    Immature Granulocytes % 0.4 0.0 - 5.0 %    Lymphocytes % 19.6 (L) 20.0 - 42.0 %    Monocytes % 4.8 2.0 - 12.0 %    Eosinophils % 2.4 0.0 - 6.0 %    Basophils % 0.7 0.0 - 2.0 %    Neutrophils Absolute 5.11 1.80 - 7.30 E9/L    Immature Granulocytes # 0.03 E9/L    Lymphocytes Absolute 1.39 (L) 1.50 - 4.00 E9/L    Monocytes Absolute 0.34 0.10 - 0.95 E9/L    Eosinophils Absolute 0.17 0.05 - 0.50 E9/L    Basophils Absolute 0.05 0.00 - 0.20 E9/L   Comprehensive Metabolic Panel w/ Reflex to MG   Result Value Ref Range    Sodium 141 132 - 146 mmol/L    Potassium reflex Magnesium 4.2 3.5 - 5.0 mmol/L    Chloride 101 98 - 107 mmol/L    CO2 29 22 - 29 mmol/L    Anion Gap 11 7 - 16 mmol/L    Glucose 104 (H) 74 - 99 mg/dL    BUN 9 6 - 20 mg/dL    CREATININE 0.7 0.5 - 1.0 mg/dL    GFR Non-African American >60 >=60 mL/min/1.73    GFR African American >60     Calcium 9.4 8.6 - 10.2 mg/dL    Total Protein 7.8 6.4 - 8.3 g/dL    Albumin 4.5 3.5 - 5.2 g/dL    Total Bilirubin 0.5 0.0 - 1.2 mg/dL    Alkaline Phosphatase 108 (H) 35 - 104 U/L    ALT 13 0 - 32 U/L    AST 20 0 - 31 U/L   Troponin   Result Value Ref Range    Troponin, High Sensitivity <6 0 - 9 ng/L   Urinalysis, reflex to microscopic   Result Value Ref Range    Color, UA Yellow Straw/Yellow    Clarity, UA Clear Clear    Glucose, Ur Negative Negative mg/dL    Bilirubin Urine Negative Negative    Ketones, Urine Negative Negative mg/dL    Specific Gravity, UA <=1.005 1.005 - 1.030    Blood, Urine Negative Negative    pH, UA 6.0 5.0 - 9.0    Protein, UA Negative Negative mg/dL    Urobilinogen, Urine 0.2 <2.0 E.U./dL    Nitrite, Urine Negative Negative    Leukocyte Esterase, Urine Negative Negative   EKG 12 Lead   Result Value Ref Range    Ventricular Rate 71 BPM    Atrial Rate 71 BPM    P-R Interval 156 ms    QRS Duration 94 ms    Q-T Interval 390 ms    QTc Calculation (Bazett) 423 ms    P Axis 63 degrees    R Axis -12 degrees    T Axis 47 degrees       RADIOLOGY:  XR CHEST PORTABLE   Final Result   No acute process. CT Head WO Contrast   Final Result   No significant abnormal findings. ------------------------- NURSING NOTES AND VITALS REVIEWED ---------------------------  Date / Time Roomed:  7/5/2021 12:07 PM  ED Bed Assignment:  Mahsa Weiss    The nursing notes within the ED encounter and vital signs as below have been reviewed. Patient Vitals for the past 24 hrs:   BP Temp Pulse Resp SpO2 Weight   07/05/21 1437 115/70 -- 71 16 98 % --   07/05/21 1249 (!) 159/90 -- 85 18 97 % --   07/05/21 1207 (!) 179/119 97.6 °F (36.4 °C) 87 18 99 % 250 lb (113.4 kg)   07/05/21 1200 -- 96.9 °F (36.1 °C) 83 16 96 % --       Oxygen Saturation Interpretation: Normal    ------------------------------------------ PROGRESS NOTES ------------------------------------------  Re-evaluation(s):  Please see ED course    Counseling:  I have spoken with the patient and discussed todays results, in addition to providing specific details for the plan of care and counseling regarding the diagnosis and prognosis. Their questions are answered at this time and they are agreeable with the plan of admission.    --------------------------------- ADDITIONAL PROVIDER NOTES ---------------------------------  Consultations:  Please see ED course    This patient's ED course included: a personal history and physicial examination, re-evaluation prior to disposition, multiple bedside re-evaluations, IV medications, cardiac monitoring, continuous pulse oximetry and complex medical decision making and emergency management    This patient has remained hemodynamically stable during their ED course. Diagnosis:  1. Paresthesias        Disposition:  Patient's disposition: Admit to med/surg floor  Patient's condition is stable.        Alva Matta DO  Resident  07/05/21 8979

## 2021-07-06 ENCOUNTER — APPOINTMENT (OUTPATIENT)
Dept: MRI IMAGING | Age: 51
End: 2021-07-06
Payer: COMMERCIAL

## 2021-07-06 LAB
ANION GAP SERPL CALCULATED.3IONS-SCNC: 11 MMOL/L (ref 7–16)
BUN BLDV-MCNC: 9 MG/DL (ref 6–20)
CALCIUM SERPL-MCNC: 9.2 MG/DL (ref 8.6–10.2)
CHLORIDE BLD-SCNC: 100 MMOL/L (ref 98–107)
CHOLESTEROL, TOTAL: 218 MG/DL (ref 0–199)
CO2: 28 MMOL/L (ref 22–29)
CREAT SERPL-MCNC: 0.7 MG/DL (ref 0.5–1)
EKG ATRIAL RATE: 71 BPM
EKG P AXIS: 63 DEGREES
EKG P-R INTERVAL: 156 MS
EKG Q-T INTERVAL: 390 MS
EKG QRS DURATION: 94 MS
EKG QTC CALCULATION (BAZETT): 423 MS
EKG R AXIS: -12 DEGREES
EKG T AXIS: 47 DEGREES
EKG VENTRICULAR RATE: 71 BPM
FOLATE: 13.7 NG/ML (ref 4.8–24.2)
GFR AFRICAN AMERICAN: >60
GFR NON-AFRICAN AMERICAN: >60 ML/MIN/1.73
GLUCOSE BLD-MCNC: 90 MG/DL (ref 74–99)
HBA1C MFR BLD: 4.8 % (ref 4–5.6)
HCT VFR BLD CALC: 42.1 % (ref 34–48)
HDLC SERPL-MCNC: 77 MG/DL
HEMOGLOBIN: 13.5 G/DL (ref 11.5–15.5)
LDL CHOLESTEROL CALCULATED: 122 MG/DL (ref 0–99)
MCH RBC QN AUTO: 30.7 PG (ref 26–35)
MCHC RBC AUTO-ENTMCNC: 32.1 % (ref 32–34.5)
MCV RBC AUTO: 95.7 FL (ref 80–99.9)
PARATHYROID HORMONE INTACT: 38 PG/ML (ref 15–65)
PDW BLD-RTO: 14.4 FL (ref 11.5–15)
PLATELET # BLD: 275 E9/L (ref 130–450)
PMV BLD AUTO: 10.6 FL (ref 7–12)
POTASSIUM REFLEX MAGNESIUM: 4.1 MMOL/L (ref 3.5–5)
RBC # BLD: 4.4 E12/L (ref 3.5–5.5)
SODIUM BLD-SCNC: 139 MMOL/L (ref 132–146)
TRIGL SERPL-MCNC: 95 MG/DL (ref 0–149)
VITAMIN B-12: 422 PG/ML (ref 211–946)
VITAMIN D 25-HYDROXY: 21 NG/ML (ref 30–100)
VLDLC SERPL CALC-MCNC: 19 MG/DL
WBC # BLD: 4.4 E9/L (ref 4.5–11.5)

## 2021-07-06 PROCEDURE — 93010 ELECTROCARDIOGRAM REPORT: CPT | Performed by: INTERNAL MEDICINE

## 2021-07-06 PROCEDURE — 84425 ASSAY OF VITAMIN B-1: CPT

## 2021-07-06 PROCEDURE — 97161 PT EVAL LOW COMPLEX 20 MIN: CPT

## 2021-07-06 PROCEDURE — 99204 OFFICE O/P NEW MOD 45 MIN: CPT | Performed by: PSYCHIATRY & NEUROLOGY

## 2021-07-06 PROCEDURE — 80061 LIPID PANEL: CPT

## 2021-07-06 PROCEDURE — 82607 VITAMIN B-12: CPT

## 2021-07-06 PROCEDURE — 80048 BASIC METABOLIC PNL TOTAL CA: CPT

## 2021-07-06 PROCEDURE — 83036 HEMOGLOBIN GLYCOSYLATED A1C: CPT

## 2021-07-06 PROCEDURE — 72141 MRI NECK SPINE W/O DYE: CPT

## 2021-07-06 PROCEDURE — 36415 COLL VENOUS BLD VENIPUNCTURE: CPT

## 2021-07-06 PROCEDURE — 2580000003 HC RX 258: Performed by: INTERNAL MEDICINE

## 2021-07-06 PROCEDURE — G0378 HOSPITAL OBSERVATION PER HR: HCPCS

## 2021-07-06 PROCEDURE — 70544 MR ANGIOGRAPHY HEAD W/O DYE: CPT

## 2021-07-06 PROCEDURE — 83921 ORGANIC ACID SINGLE QUANT: CPT

## 2021-07-06 PROCEDURE — 6370000000 HC RX 637 (ALT 250 FOR IP): Performed by: INTERNAL MEDICINE

## 2021-07-06 PROCEDURE — 70551 MRI BRAIN STEM W/O DYE: CPT

## 2021-07-06 PROCEDURE — 85027 COMPLETE CBC AUTOMATED: CPT

## 2021-07-06 PROCEDURE — 82746 ASSAY OF FOLIC ACID SERUM: CPT

## 2021-07-06 PROCEDURE — 83970 ASSAY OF PARATHORMONE: CPT

## 2021-07-06 PROCEDURE — 6370000000 HC RX 637 (ALT 250 FOR IP): Performed by: FAMILY MEDICINE

## 2021-07-06 PROCEDURE — 82306 VITAMIN D 25 HYDROXY: CPT

## 2021-07-06 PROCEDURE — 86235 NUCLEAR ANTIGEN ANTIBODY: CPT

## 2021-07-06 RX ORDER — MELATONIN
1000 DAILY
Qty: 90 TABLET | Refills: 1 | Status: SHIPPED | OUTPATIENT
Start: 2021-07-06 | End: 2022-03-21

## 2021-07-06 RX ORDER — ACETAMINOPHEN 325 MG/1
650 TABLET ORAL EVERY 6 HOURS PRN
Status: DISCONTINUED | OUTPATIENT
Start: 2021-07-06 | End: 2021-07-08 | Stop reason: HOSPADM

## 2021-07-06 RX ADMIN — Medication 10 ML: at 23:01

## 2021-07-06 RX ADMIN — LORAZEPAM 0.5 MG: 0.5 TABLET ORAL at 20:00

## 2021-07-06 RX ADMIN — APIXABAN 5 MG: 5 TABLET, FILM COATED ORAL at 22:21

## 2021-07-06 RX ADMIN — APIXABAN 5 MG: 5 TABLET, FILM COATED ORAL at 09:35

## 2021-07-06 RX ADMIN — PANTOPRAZOLE SODIUM 40 MG: 40 TABLET, DELAYED RELEASE ORAL at 06:35

## 2021-07-06 RX ADMIN — ASPIRIN 81 MG: 81 TABLET, COATED ORAL at 09:35

## 2021-07-06 RX ADMIN — Medication 10 ML: at 07:34

## 2021-07-06 RX ADMIN — ACETAMINOPHEN 650 MG: 325 TABLET ORAL at 09:35

## 2021-07-06 RX ADMIN — LORAZEPAM 0.5 MG: 0.5 TABLET ORAL at 07:34

## 2021-07-06 ASSESSMENT — PAIN SCALES - GENERAL
PAINLEVEL_OUTOF10: 0
PAINLEVEL_OUTOF10: 3
PAINLEVEL_OUTOF10: 0
PAINLEVEL_OUTOF10: 0

## 2021-07-06 ASSESSMENT — PAIN DESCRIPTION - LOCATION: LOCATION: NECK

## 2021-07-06 ASSESSMENT — PAIN DESCRIPTION - DESCRIPTORS: DESCRIPTORS: ACHING;CONSTANT;DISCOMFORT

## 2021-07-06 NOTE — DISCHARGE SUMMARY
Hospitalist Discharge Summary    Patient ID: Obzana Mancini   Patient : 1970  Patient's PCP: Saleem Blount DO    Admit Date: 2021   Admitting Physician: Jackie Vazquez MD    Discharge Date:  2021   Discharge Physician: Chayo Torrez MD   Discharge Condition: Stable  Discharge Disposition: [facility] course in brief:  (Please refer to daily progress notes for a comprehensive review of the hospitalization by requesting medical records)    Briefly this is a 30-year-old female with a history of A. fib, fibromyalgia, depression and anxiety presented for paresthesias of her upper back and neck. Patient also has history of ocular headache. Patient had a very thorough work-up including MRI brain which was unremarkable for acute strokes. MRI C-spine which showed mild multilevel degenerative disc disease without canal stenosis or foraminal narrowing. Neurology consultation was sought thereafter recommended MRV of the head for concerns of pulsatile tinnitus. The study showed a small filling defect in the left transverse sinus. CTA venous showed a typical appearance of a arachnoid granulation on the brain at the same site which is a normal variant. Patient had metabolic work-up including B12 folate, A1c, lipid panel, PTH, Sjogren's antibodies and vitamin D. Patient was deficient in B12 and vitamin D which were both repleted prior to discharge. Patient has multiple specialty appointments at tertiary centers. Patient was requested to see her PCP and keep her future appointments. .    Discharge exam  /80   Pulse 66   Temp 97.6 °F (36.4 °C) (Temporal)   Resp 22   Ht 5' 3.5\" (1.613 m)   Wt 250 lb (113.4 kg)   SpO2 100%   BMI 43.59 kg/m²     /80   Pulse 66   Temp 97.6 °F (36.4 °C) (Temporal)   Resp 22   Ht 5' 3.5\" (1.613 m)   Wt 250 lb (113.4 kg)   SpO2 100%   BMI 43.59 kg/m²   Constitutional: oriented to person, place, and time,appears well-developed and well-nourished. HEENT:   Right Ear: External ear normal.   Left Ear: External ear normal.   Mouth/Throat: Oropharynx is clear and moist.   Eyes: Conjunctivae and EOM are normal. PERRLA. Neck: Normal range of motion. No thyromegaly present. No bruit. No JVD  Cardiovascular: Normal rate, regular rhythm, normal heart sounds. No murmurs appreciated  Pulmonary/Chest: Effort normal and breath sounds normal.  No rhonchi or rales appreciated  Abdominal: Soft. Bowel sounds are normal. Exhibits no distension. No tenderness. Musculoskeletal: Normal range of motion. Exhibits no edema.  deferred  Neurological:  is alert and oriented to person, place, and time. Motor and sensation grossly intact. Skin: Skin is warm and dry. No rashes, lesions. Psychiatric: has a normal mood and affect.  Behavior is normal.           Consults:   IP CONSULT TO NEUROLOGY  IP CONSULT TO INTERNAL MEDICINE  IP CONSULT TO NEUROLOGY    Discharge Diagnoses:    Right-sided paresthesia and headache  Depression anxiety  Hypothyroidism  Fibromyalgia  Class III morbid obesity  Atrial fibrillation on Eliquis  [place]    Discharge Instructions / Follow up:    Future Appointments   Date Time Provider Ann Erinn   7/13/2021 10:15 AM [facility] CARDIO ECHO ROOM 1 [facility] ECHO [facility]   9/10/2021 10:30 AM Ritu Bey MD ELECTRO PHYS [facility]   10/7/2021 10:00 AM Antonio Vazquez MD BDM ENDO [facility]       Continued appropriate risk factor modification of blood pressure, diabetes and serum lipids will remain essential to reducing risk of future atherosclerotic development    Activity: activity as tolerated    Significant labs:  CBC:   Recent Labs     07/05/21  1238 07/06/21  0559   WBC 7.1 4.4*   RBC 4.61 4.40   HGB 14.1 13.5   HCT 43.5 42.1   MCV 94.4 95.7   RDW 14.4 14.4    275     BMP:   Recent Labs     07/05/21  1238 07/06/21  0559    139   K 4.2 4.1    100   CO2 29 28   BUN 9 9   CREATININE 0.7 0.7     LFT:  Recent Labs 07/05/21  1238   PROT 7.8   ALKPHOS 108*   ALT 13   AST 20   BILITOT 0.5     PT/INR: No results for input(s): INR, APTT in the last 72 hours. BNP: No results for input(s): BNP in the last 72 hours. Hgb A1C:   Lab Results   Component Value Date    LABA1C 4.8 07/06/2021     Folate and B12:   Lab Results   Component Value Date    IVVYDMBQ53 053 07/06/2021   ,   Lab Results   Component Value Date    FOLATE 13.7 07/06/2021     Thyroid Studies:   Lab Results   Component Value Date    TSH 6.840 (H) 06/06/2021    P7PGAWZ 82.29 06/06/2021    O0PJGRZ 8.4 06/06/2021       Urinalysis:    Lab Results   Component Value Date    NITRU Negative 07/05/2021    BLOODU Negative 07/05/2021    SPECGRAV <=1.005 07/05/2021    GLUCOSEU Negative 07/05/2021       Imaging:  CT Head WO Contrast    Result Date: 7/5/2021  EXAMINATION: CT OF THE HEAD WITHOUT CONTRAST  7/5/2021 12:39 pm TECHNIQUE: CT of the head was performed without the administration of intravenous contrast. Dose modulation, iterative reconstruction, and/or weight based adjustment of the mA/kV was utilized to reduce the radiation dose to as low as reasonably achievable. COMPARISON: None. HISTORY: ORDERING SYSTEM PROVIDED HISTORY: \"tingling\" of right scalp, right upper back, right neck TECHNOLOGIST PROVIDED HISTORY: Reason for exam:->\"tingling\" of right scalp, right upper back, right neck Has a \"code stroke\" or \"stroke alert\" been called? ->No Decision Support Exception - unselect if not a suspected or confirmed emergency medical condition->Emergency Medical Condition (MA) What reading provider will be dictating this exam?->CRC FINDINGS: No intracranial mass, hemorrhage or midline shift. Normal ventricles and sulci. Unremarkable bony calvarium. No significant abnormal findings.      XR CHEST PORTABLE    Result Date: 7/5/2021  EXAMINATION: ONE XRAY VIEW OF THE CHEST 7/5/2021 2:20 pm COMPARISON: 06/06/2021 HISTORY: ORDERING SYSTEM PROVIDED HISTORY: right shoulder tingling/twitching TECHNOLOGIST PROVIDED HISTORY: Reason for exam:->right shoulder tingling/twitching What reading provider will be dictating this exam?->CRC FINDINGS: The lungs are without acute focal process. There is no effusion or pneumothorax. The cardiomediastinal silhouette is without acute process. The osseous structures are without acute process. No acute process. Vascular carotid duplex bilateral    Result Date: 2021  Patient MRN:  [id number] : 1970 Age: 46 years Gender: Female Order Date:  2021 5:38 PM EXAM: VL DUP CAROTID BILATERAL NUMBER OF IMAGES:  55 INDICATION:  tingles tingles What reading provider will be dictating this exam?->[facility] COMPARISON: None FINDINGS: Velocities are mildly elevated on the right ICA\CCA ratios are  within normal limits The right vertebral artery doppler images demonstrate  antegrade flow. The left vertebral artery doppler images demonstrate  antegrade flow. Grey scale images demonstrate mild plaque identified in the right and left carotid arteries. Atherosclerotic disease. No hemodynamically significant stenosis is identified Estimated stenosis by NASCET criteria in the proximal right carotid artery is between 0% and 49%. Estimated stenosis by NASCET criteria in the proximal left carotid artery is between 0% and 49%.      MRI brain without contrast    Result Date: 2021  EXAMINATION: MRI OF THE BRAIN WITHOUT CONTRAST  2021 8:31 am TECHNIQUE: Multiplanar multisequence MRI of the brain was performed without the administration of intravenous contrast. COMPARISON: Unenhanced head CT dated 2021 HISTORY: ORDERING SYSTEM PROVIDED HISTORY: paresthesias TECHNOLOGIST PROVIDED HISTORY: Reason for exam:->paresthesias Decision Support Exception - unselect if not a suspected or confirmed emergency medical condition->Emergency Medical Condition (MA) What reading provider will be dictating this exam?->CRC FINDINGS: INTRACRANIAL STRUCTURES/VENTRICLES: There is no acute infarct. No mass effect or midline shift. No evidence of an acute intracranial hemorrhage. The ventricles and sulci are normal in size and configuration. The sellar/suprasellar regions appear unremarkable. The normal signal voids within the major intracranial vessels appear maintained. ORBITS: The visualized portion of the orbits demonstrate no acute abnormality. SINUSES: The visualized paranasal sinuses and mastoid air cells are well aerated. There is mild mucosal thickening with a 2 cm retention cyst in the right maxillary sinus. BONES/SOFT TISSUES: The bone marrow signal intensity appears normal. The soft tissues demonstrate no acute abnormality. Unremarkable MRI of the brain. No evidence of acute intracranial abnormality.        Discharge Medications:      Medication List      START taking these medications    vitamin D3 25 MCG (1000 UT) Tabs tablet  Commonly known as: CHOLECALCIFEROL  Take 1 tablet by mouth daily        CHANGE how you take these medications    Tirosint 112 MCG Caps  Generic drug: Levothyroxine Sodium  Take 1 capsule daily  What changed: additional instructions        CONTINUE taking these medications    apixaban 5 MG Tabs tablet  Commonly known as: ELIQUIS  Take 1 tablet by mouth 2 times daily     LORazepam 0.5 MG tablet  Commonly known as: ATIVAN     metoprolol tartrate 25 MG tablet  Commonly known as: LOPRESSOR  Take 0.5 tablets by mouth every evening     omeprazole 20 MG delayed release capsule  Commonly known as: PRILOSEC        STOP taking these medications    clobetasol 0.05 % ointment  Commonly known as: TEMOVATE           Where to Get Your Medications      These medications were sent to [address], [address] - P [phone] - F [phone]  [address] [address]    Phone: [phone]   · vitamin D3 25 MCG (1000 UT) Tabs tablet         Time Spent on discharge is more than 45 minutes in the examination, evaluation, counseling and review of medications and discharge plan.    +++++++++++++++++++++++++++++++++++++++++++++++++  Betsy Mcintyre MD  [facility] - [address] [place] [address]  +++++++++++++++++++++++++++++++++++++++++++++++++  NOTE: This report was transcribed using voice recognition software. Every effort was made to ensure accuracy; however, inadvertent computerized transcription errors may be present.

## 2021-07-06 NOTE — PLAN OF CARE
Problem: HEMODYNAMIC STATUS  Goal: Patient has stable vital signs and fluid balance  7/6/2021 0220 by Kaydne Burgos RN  Outcome: Met This Shift  7/5/2021 1959 by Christiano Willoughby RN  Outcome: Met This Shift     Problem: ACTIVITY INTOLERANCE/IMPAIRED MOBILITY  Goal: Mobility/activity is maintained at optimum level for patient  7/6/2021 0220 by Kayden Burgos RN  Outcome: Met This Shift  7/5/2021 1959 by Christiano Willoughby RN  Outcome: Met This Shift     Problem: COMMUNICATION IMPAIRMENT  Goal: Ability to express needs and understand communication  7/6/2021 0220 by Kayden Burgos RN  Outcome: Met This Shift  7/5/2021 1959 by Christiano Willoughby RN  Outcome: Met This Shift

## 2021-07-06 NOTE — PROGRESS NOTES
Hospitalist Progress Note      SYNOPSIS: Patient admitted on  66-year-old female history of paroxysmal atrial fibrillation on Eliquis, anxiety, fibromyalgia, hypothyroidism presents to ED with tingling sensation. Patient is evaluated in the emergency department at [facility] with paresthesias and headache. Patient notes intermittent paresthesias. Started yesterday with bilateral shoulder tingling. She notes that today she has had some right-sided facial tingling. Lasting for minutes no exacerbation or relief. this was associated with transient right neck and head pain. Lasting for minutes no exacerbation relief. Patient also reports episode of A. fib this morning when she woke up. She states A. fib is a new diagnosis she has been in A. fib a couple times now. She is on Eliquis. Patient denies history of stroke or migraine. She states that she thinks she has POTS and will be going to the [facility] for evaluation. Denies chest pain or trouble breathing. No speech or vision changes. Patient does report she has some difficulty swallowing today      SUBJECTIVE:    Patient seen and examined  Records reviewed. A lot of nonspecific questions this morning. Requesting B vitamin levels checked. Vitamin D level checked. Questions about gastric bypass. She reports no changes in the paresthesias on her upper back. She does have history of ocular migraines she reports. MRI brain is currently pending read  Stable overnight. No other overnight issues reported. Temp (24hrs), Av.7 °F (36.5 °C), Min:96.9 °F (36.1 °C), Max:98.2 °F (36.8 °C)    DIET: ADULT DIET;  Regular; Low Fat/Low Chol/High Fiber/BEVERLEY  CODE: Full Code  No intake or output data in the 24 hours ending 21 1054    OBJECTIVE:    /80   Pulse 64   Temp 98.2 °F (36.8 °C) (Temporal)   Resp 18   Ht 5' 3.5\" (1.613 m)   Wt 250 lb (113.4 kg)   SpO2 98%   BMI 43.59 kg/m²     General appearance: No apparent distress, appears stated age and cooperative. HEENT:  Conjunctivae/corneas clear. Neck: Supple. No jugular venous distention. Respiratory: Clear to auscultation bilaterally, normal respiratory effort  Cardiovascular: Regular rate rhythm, normal S1-S2  Abdomen: Soft, nontender, nondistended  Musculoskeletal: No clubbing, cyanosis, no bilateral lower extremity edema. Brisk capillary refill. Skin:  No rashes  on visible skin  Neurologic: awake, alert and following commands     ASSESSMENT:    Paresthesias of upper back  Hypothyroidism  Fibromyalgia  Anxiety  Class III morbid obesity with a BMI of 48  History of atrial fibrillation  [place]     PLAN:    MRI brain is unremarkable. Discussed patient's lab work in high level of detail. Discussed LDL. Discussed A1c. Discussed BMP and CBC. B12 within normal limits. There is no anemia. CT head upon admission was unremarkable. Neurology consultation is currently pending  PT OT consultation    DISPOSITION: Pending further neurological work-up. Likely home.     Medications:  REVIEWED DAILY    Infusion Medications    sodium chloride       Scheduled Medications    apixaban  5 mg Oral BID    LORazepam  0.5 mg Oral BID    pantoprazole  40 mg Oral QAM AC    metoprolol succinate  25 mg Oral QPM    levothyroxine  112 mcg Oral QAM AC    sodium chloride flush  5-40 mL Intravenous 2 times per day    aspirin  81 mg Oral Daily    Or    aspirin  300 mg Rectal Daily    atorvastatin  40 mg Oral Nightly     PRN Meds: acetaminophen, sodium chloride flush, sodium chloride, ondansetron **OR** ondansetron, polyethylene glycol    Labs:     Recent Labs     07/05/21  1238 07/06/21  0559   WBC 7.1 4.4*   HGB 14.1 13.5   HCT 43.5 42.1    275       Recent Labs     07/05/21  1238 07/06/21  0559    139   K 4.2 4.1    100   CO2 29 28   BUN 9 9   CREATININE 0.7 0.7   CALCIUM 9.4 9.2       Recent Labs     07/05/21  1238   PROT 7.8   ALKPHOS 108*   ALT 13   AST 20 BILITOT 0.5       No results for input(s): INR in the last 72 hours. No results for input(s): Russell Altes in the last 72 hours. Chronic labs:    Lab Results   Component Value Date    CHOL 218 (H) 07/06/2021    TRIG 95 07/06/2021    HDL 77 07/06/2021    LDLCALC 122 (H) 07/06/2021    TSH 6.840 (H) 06/06/2021    INR 1.1 06/06/2021    LABA1C 4.8 07/06/2021       Radiology: REVIEWED DAILY    +++++++++++++++++++++++++++++++++++++++++++++++++  Tsering Wilson MD  [facility] Physician - [address] [place] [address]  +++++++++++++++++++++++++++++++++++++++++++++++++  NOTE: This report was transcribed using voice recognition software. Every effort was made to ensure accuracy; however, inadvertent computerized transcription errors may be present.

## 2021-07-06 NOTE — CONSULTS
[address] [place] [address],[address]  Neurology Consult    Date:  7/6/2021  Patient Name:  James Phillips  YOB: 1970  MRN: [id number]     PCP:  Mya Cohen DO   Referring:  No ref. provider found      Chief Complaint: tingling, headaches    History obtained from: patient, chart    Srinivasa Shaver is a 46 y.o. female with recent episode of afib and elevated BP on presentation to the ED which I suspect were the primary reason for her symptoms which have now resolved. She does however have other symptoms concerning for POTS +/- other autonomic symptoms for which he has follow up scheduled in [facility] in the near future. Plan  · MRI C-spine to rule out spinal etiology for bilateral UE paresthesias  · Low B12 noted given neurologic symptoms - check MMA  · Hx of gastric bypass, check B1  · MRV head given pulsating vessel noted over right eye  · Imaging can be done outpatient if it will significantly prolong inpatient stay        History of Present Illness:  James Pickettr is a 46 y.o. right handed female presenting for evaluation of paresthesias over her shoulders. Patient had an episode of afib the day of admission. She then noted a tingling feeling over her shoulders which settle in over the right side of her head and neck followed by a brief pain in her right eye. She then began having a feeling of being cold and tremors in her right shoulder. She did have a chiropractor work on her neck and mid-back and since then she has had some pain in the mid back region. MRI of her neck was done at [facility] which was of poor quality reportedly. She will also get a feeling of hearing her heartbeat in her head the past several years. She will at times get a feeling of being stabbed in the right side of her head with an ice pick. Pulsing over the right eye for the past couple of months. Recent hair leoss. Has a history of dry eyes and dry mouth.      Review of Systems:  As above    Medical History: Past Medical History:   Diagnosis Date    Atrial fibrillation (HCC)     Obesity     Prolonged emergence from general anesthesia     SENSITIVE TO MEDS, TAKES LESS TO PUT HER UNDER, DIFFICULTY WAKING, STOPS BREATHING PER PATIENT    Thyroid disease         Surgical History:   Past Surgical History:   Procedure Laterality Date    ABDOMEN SURGERY       SECTION      x2    CHOLECYSTECTOMY      COLONOSCOPY N/A 2020    COLORECTAL CANCER SCREENING, HIGH RISK performed by Irina Olivia MD at [address] [place] [address]      UPPER GASTROINTESTINAL ENDOSCOPY N/A 2020    EGD BIOPSY performed by Irina Olivia MD at [facility]        Family History:   No family history on file.     Social History:  Social History     Tobacco Use    Smoking status: Never Smoker    Smokeless tobacco: Never Used   Vaping Use    Vaping Use: Never used   Substance Use Topics    Alcohol use: No    Drug use: No        Current Medications:      Current Facility-Administered Medications   Medication Dose Route Frequency Provider Last Rate Last Admin    acetaminophen (TYLENOL) tablet 650 mg  650 mg Oral Q6H PRN Chanelle Dela Cruz MD   650 mg at 21 0935    apixaban (ELIQUIS) tablet 5 mg  5 mg Oral BID Erica Driver MD   5 mg at 21 0935    LORazepam (ATIVAN) tablet 0.5 mg  0.5 mg Oral BID Erica Driver MD   0.5 mg at 21 0734    pantoprazole (PROTONIX) tablet 40 mg  40 mg Oral QAM AC Erica Driver MD   40 mg at 21 4486    metoprolol succinate (TOPROL XL) extended release tablet 25 mg  25 mg Oral QPM Erica Driver MD        levothyroxine (SYNTHROID) tablet 112 mcg  112 mcg Oral QAM ARASH Driver MD        sodium chloride flush 0.9 % injection 5-40 mL  5-40 mL Intravenous 2 times per day Erica Driver MD   10 mL at 21 0734    sodium chloride flush 0.9 % injection 5-40 mL 5-40 mL Intravenous PRN Chey Burton MD        0.9 % sodium chloride infusion  25 mL Intravenous PRN Chey Burton MD        ondansetron (ZOFRAN-ODT) disintegrating tablet 4 mg  4 mg Oral Q8H PRN Chey Burton MD        Or    ondansetron [facility]) injection 4 mg  4 mg Intravenous Q6H PRN Chey Burton MD        polyethylene glycol [facility]) packet 17 g  17 g Oral Daily PRN Chey Burton MD        aspirin EC tablet 81 mg  81 mg Oral Daily Chey Burton MD   81 mg at 07/06/21 0935    Or    aspirin suppository 300 mg  300 mg Rectal Daily Chey Burton MD        atorvastatin (LIPITOR) tablet 40 mg  40 mg Oral Nightly Chey Burton MD            Allergies: Allergies   Allergen Reactions    Avelox [Moxifloxacin Hcl In Nacl]     Cephalexin     Ciprofloxacin     Clindamycin     Coffee Bean Extract [Coffea Arabica]     Magnesium Sulfate     Penicillins     Percocet [Oxycodone-Acetaminophen]     Sulfa Antibiotics         Physical Examination  Vitals   Vitals:    07/05/21 1900 07/05/21 1949 07/05/21 2345 07/06/21 0745   BP: 121/75  110/78 125/80   Pulse: 61  58 64   Resp: 16  16 18   Temp: 97.8 °F (36.6 °C)  98.2 °F (36.8 °C) 98.2 °F (36.8 °C)   TempSrc: Temporal  Temporal Temporal   SpO2: 97%  98% 98%   Weight:  250 lb (113.4 kg)     Height:  5' 3.5\" (1.613 m)          General: Patient appears in no acute distress  HEENT: Normocephalic, atraumatic. No bruit noted over auscultation of right periorbital and ocular region. Regular pulsations noted over the right superior aspect of eyelid  Chest: no respiratory distress noted  Extremities: No pitting edema or cyanosis noted    Neurologic Examination    Mental Status  Alert, and oriented to person, place and time with normal speech and language. No evidence of aphasia during conversation. No evidence of memory impairment. Attention and concentration appeared normal.     Cranial Nerves  II.  Visual fields full to confrontation bilaterally. Fundoscopic exam: Discs sharp bilaterally, venous pulsations present  III, IV, VI: Pupils equally round and reactive to light, 4 to 3 mm bilaterally. EOMs: full, no nystagmus. V. Facial sensation intact to light touch bilaterally  VII: Facial movements symmetric and strong  VIII: Hearing intact to voice  IX,X: Palate elevates symmetrically. No dysarthria  XI: Sternocleidomastoid and trapezius 5/5 bilaterally   XII: Tongue is midline    Motor     Right Left   Right Left   Deltoid 5 5  Hip Flexion 5 5   Biceps      5  5  Knee Extension 5 5   Triceps 5 5  Knee Flexion 5 5   Handgrip 5 5  Ankle Dorsiflexion 5 5       Ankle Plantarflexion 5 5     Tone: Normal in all four limbs    Bulk: Normal in all four limbs with no evidence of atrophy    Sensation  · Light Touch: Intact distally in all four limbs  · Vibration: Intact distally in all four limbs    Reflexes     Right Left   Biceps 2 2   Brachioradialis 2 2   Triceps 2 2   Patellar 1 1   Achilles 1 1   ankle clonus none none     Toes silent bilaterally. Coordination  Rapid alternating movements normal in bilateral upper extremities  Finger to nose testing normal bilaterally  Heel to shin testing normal bilaterally    Gait  Normal base, stride, and arm swing with casual gait. 2-3 steps to turn. Labs  Results for Pilar Kelso (MRN [id number]) as of 7/6/2021 09:40   Ref.  Range 7/6/2021 05:59   Sodium Latest Ref Range: 132 - 146 mmol/L 139   Potassium Latest Ref Range: 3.5 - 5.0 mmol/L 4.1   Chloride Latest Ref Range: 98 - 107 mmol/L 100   CO2 Latest Ref Range: 22 - 29 mmol/L 28   BUN Latest Ref Range: 6 - 20 mg/dL 9   Creatinine Latest Ref Range: 0.5 - 1.0 mg/dL 0.7   Anion Gap Latest Ref Range: 7 - 16 mmol/L 11   GFR Non- Latest Ref Range: >=60 mL/min/1.73 >60   GFR African American Unknown >60   Glucose Latest Ref Range: 74 - 99 mg/dL 90   Calcium Latest Ref Range: 8.6 - 10.2 mg/dL 9.2   Cholesterol, Total Latest Ref Range: 0 - 199 mg/dL 218 (H)   HDL Cholesterol Latest Ref Range: >40 mg/dL 77   LDL Calculated Latest Ref Range: 0 - 99 mg/dL 122 (H)   Triglycerides Latest Ref Range: 0 - 149 mg/dL 95   VLDL Cholesterol Calculated Latest Units: mg/dL 19   Hemoglobin A1C Latest Ref Range: 4.0 - 5.6 % 4.8   WBC Latest Ref Range: 4.5 - 11.5 E9/L 4.4 (L)   RBC Latest Ref Range: 3.50 - 5.50 E12/L 4.40   Hemoglobin Quant Latest Ref Range: 11.5 - 15.5 g/dL 13.5   Hematocrit Latest Ref Range: 34.0 - 48.0 % 42.1   MCV Latest Ref Range: 80.0 - 99.9 fL 95.7   MCH Latest Ref Range: 26.0 - 35.0 pg 30.7   MCHC Latest Ref Range: 32.0 - 34.5 % 32.1   MPV Latest Ref Range: 7.0 - 12.0 fL 10.6   RDW Latest Ref Range: 11.5 - 15.0 fL 14.4   Platelet Count Latest Ref Range: 130 - 450 E9/L 275   Folate Latest Ref Range: 4.8 - 24.2 ng/mL 13.7   Vitamin B-12 Latest Ref Range: 211 - 946 pg/mL 422       Imaging  MRI brain w/o contrast     Impression   Unremarkable MRI of the brain.  No evidence of acute intracranial abnormality. MRA head 5/26/21  RESULT: The distal vertebral, basilar and internal carotid arteries are   normal in appearance to their termini.  There is symmetric flow related   enhancement in the anterior, middle and posterior cerebral arteries   without significant stenosis or irregularity. No focal intracranial saccular aneurysm seen although aneurysms 3 mm or   less may not be detected by MRA technique. No arteriovenous malformation demonstrated. IMPRESSION:     Unremarkable intracranial MRA.            Electronically signed by: Sissy Riojas DO, 7/6/2021 9:39 AM

## 2021-07-06 NOTE — PROGRESS NOTES
Physical Therapy  Physical Therapy Initial Assessment     Name: Enmanuel Werner  : 1970  MRN: [id number]       Date of Service: 2021    Evaluating PT:  Marly Roberto, PT, DPT [id number]    Room #:  8697/1386-F  Diagnosis:  Paresthesias [R20.2]  PMHx/PSHx:  Thyroid disease, afib, obesity   Precautions:  falls  Equipment Needs:  none    SUBJECTIVE:    Pt lives with  and 2 children in a 1 story home with 3 stairs to enter and 1 rail. Bed is on first floor and bath is on first floor. Pt ambulated with no AD independently PTA. She reports she has 1 child who is total care at home. Pt reports 2 incidental falls recently. Also notes bouts of light headedness and rapid HR but states she has never passed out or fallen because of this. Pt also reports numbness/tingling to RUE as well as back pain that radiates into her RLE. OBJECTIVE:   Initial Evaluation  Date: 2021 Treatment Short Term/ Long Term   Goals   AM-PAC 6 Clicks 1460     Was pt agreeable to Eval/treatment? yes     Does pt have pain? Mild back pain     Bed Mobility  Rolling: Supervision  Supine to sit: Supervision  Sit to supine: Supervision  Scooting: Supervision  Rolling: Independent  Supine to sit: Independent  Sit to supine: Independent  Scooting: Independent   Transfers Sit to stand: SBA  Stand to sit: SBA  Stand pivot: SBA no AD  Sit to stand: Independent  Stand to sit:  Independent  Stand pivot: Independent   Ambulation    150 feet with no AD  SBA  300 feet with no AD Independent   Stair negotiation: ascended and descended  4 steps 2 rails SBA ascending forwards descending backwards  4 steps with 1 rail Saw   ROM BUE:  Per OT note  BLE:  WNL     Strength BUE:  Per OT note  BLE:  WNL     Balance Sitting EOB:  Independent  Dynamic Standing:  SBA  Sitting EOB:  Independent  Dynamic Standing:  Independent      Pt is A & O x 4  Sensation:  Pt denies numbness and tingling to extremities  Edema:  unremarkable    Vitals:  HR monitored throughout session   at rest 60s-80s  Standing 90s-120s  Pt reports mild light headedness at times but it did not affect mobility. Patient education  Pt educated on role of PT intervention. Pt educated on safety in room with utilization of call light for assistance with mobility. Pt educated on importance of maximizing OOB time by transferring to bedside chair for meals and ambulating to bathroom/transferring to bedside commode with assistance from nursing and therapy staff to increase functional activity tolerance and overall functional independence. Patient response to education:   Pt verbalized understanding Pt demonstrated skill Pt requires further education in this area   yes yes yes     ASSESSMENT:    Conditions Requiring Skilled Therapeutic Intervention:    [x]Decreased strength     []Decreased ROM  [x]Decreased functional mobility  []Decreased balance   [x]Decreased endurance   []Decreased posture  []Decreased sensation  []Decreased coordination   []Decreased vision  []Decreased safety awareness   []Increased pain       Comments:  RN cleared pt for activity prior to session. Pt received supine in bed and agreeable to PT intervention at this time. Pt performed all functional mobility as noted above. Pt anxious about mobility d/t her reporting concern for her BP and HR. Pt states she has POTS which limits her mobility but she also states she has not had any syncopal episodes or falls at home d/t this. Pt tolerated ambulation in hallway well. Pt returned to supine at end of session and left with all needs met and call light in reach. Pt requires continued skilled PT intervention for the purposes of maximizing functional mobility and independence by addressing deficits described above. Treatment:  Patient practiced and was instructed in the following treatment:     Therapeutic Activities Completed:  o Functional mobility as noted above:   - Bed mobility: as noted above.     - Transfer training: SBA. Vitals monitored throughout. Close SBA provided d/t pt's concerns for light headedness/POTS. - Ambulation: 150 feet no AD x 2 reps. Close SBA for vitals monitoring.  - Stair Trainin steps with 2 rails ascending forwards and descending backwards. o Skilled repositioning in supine with HOB elevated for comfort.  o Pt education as noted above. Pt's/ family goals   1. Return home. Prognosis is good for reaching above PT goals. Patient and or family understand(s) diagnosis, prognosis, and plan of care. yes    PHYSICAL THERAPY PLAN OF CARE:    PT POC is established based on physician order and patient diagnosis     Referring provider/PT Order:    21 [id number]  PT evaluation and treat Start: 21, End: 21, ONE TIME, Standing Count: 1 Occurrences, R      Lina Portillo MD     Diagnosis:  Paresthesias [R20.2]  Specific instructions for next treatment:  Ambulate as tolerated. Increase activity tolerance. Current Treatment Recommendations:     [x] Strengthening to improve independence with functional mobility   [x] ROM to improve independence with functional mobility   [x] Balance Training to improve static/dynamic balance and to reduce fall risk  [x] Endurance Training to improve activity tolerance during functional mobility   [x] Transfer Training to improve safety and independence with all functional transfers   [x] Gait Training to improve gait mechanics, endurance and assess need for appropriate assistive device  [x] Stair Training in preparation for safe discharge home and/or into the community   [x] Positioning to prevent skin breakdown and contractures  [x] Safety and Education Training   [x] Patient/Caregiver Education   [] HEP  [] Other     PT long term treatment goals are located in above grid    Frequency of treatments: 2-5x/week x 1-2 weeks.     Time in  1455  Time out  1510    Total Treatment Time  10 minutes     Evaluation Time includes thorough review of current medical information, gathering information on past medical history/social history and prior level of function, completion of standardized testing/informal observation of tasks, assessment of data and education on plan of care and goals.     CPT codes:  [x] Low Complexity PT evaluation 09107  [] Moderate Complexity PT evaluation 62144  [] High Complexity PT evaluation 69913  [] PT Re-evaluation 92787  [] Gait training 76239 0 minutes  [] Manual therapy 50451 0 minutes  [x] Therapeutic activities 74578 10 minutes  [] Therapeutic exercises 74756 0 minutes  [] Neuromuscular reeducation 33977 0 minutes     Tony Metzger, PT, DPT  [id number]

## 2021-07-07 ENCOUNTER — TELEPHONE (OUTPATIENT)
Dept: ENDOCRINOLOGY | Age: 51
End: 2021-07-07

## 2021-07-07 ENCOUNTER — APPOINTMENT (OUTPATIENT)
Dept: CT IMAGING | Age: 51
End: 2021-07-07
Payer: COMMERCIAL

## 2021-07-07 ENCOUNTER — APPOINTMENT (OUTPATIENT)
Dept: GENERAL RADIOLOGY | Age: 51
End: 2021-07-07
Payer: COMMERCIAL

## 2021-07-07 PROBLEM — H93.A1 PULSATILE TINNITUS OF RIGHT EAR: Status: ACTIVE | Noted: 2021-07-07

## 2021-07-07 LAB
ENA TO SSA (RO) ANTIBODY: NEGATIVE
ENA TO SSB (LA) ANTIBODY: NEGATIVE

## 2021-07-07 PROCEDURE — 6370000000 HC RX 637 (ALT 250 FOR IP): Performed by: INTERNAL MEDICINE

## 2021-07-07 PROCEDURE — G0378 HOSPITAL OBSERVATION PER HR: HCPCS

## 2021-07-07 PROCEDURE — 99226 PR SBSQ OBSERVATION CARE/DAY 35 MINUTES: CPT | Performed by: NURSE PRACTITIONER

## 2021-07-07 PROCEDURE — 6360000004 HC RX CONTRAST MEDICATION: Performed by: NURSE PRACTITIONER

## 2021-07-07 PROCEDURE — 70496 CT ANGIOGRAPHY HEAD: CPT

## 2021-07-07 PROCEDURE — 2580000003 HC RX 258: Performed by: INTERNAL MEDICINE

## 2021-07-07 PROCEDURE — 6370000000 HC RX 637 (ALT 250 FOR IP): Performed by: FAMILY MEDICINE

## 2021-07-07 PROCEDURE — 73140 X-RAY EXAM OF FINGER(S): CPT

## 2021-07-07 RX ORDER — LEVOTHYROXINE SODIUM 112 UG/1
112 CAPSULE ORAL DAILY
Status: DISCONTINUED | OUTPATIENT
Start: 2021-07-08 | End: 2021-07-08 | Stop reason: HOSPADM

## 2021-07-07 RX ORDER — LORAZEPAM 0.5 MG/1
0.5 TABLET ORAL 3 TIMES DAILY
Status: DISCONTINUED | OUTPATIENT
Start: 2021-07-07 | End: 2021-07-08 | Stop reason: HOSPADM

## 2021-07-07 RX ADMIN — Medication 10 ML: at 08:35

## 2021-07-07 RX ADMIN — APIXABAN 5 MG: 5 TABLET, FILM COATED ORAL at 20:47

## 2021-07-07 RX ADMIN — IOPAMIDOL 60 ML: 755 INJECTION, SOLUTION INTRAVENOUS at 19:42

## 2021-07-07 RX ADMIN — APIXABAN 5 MG: 5 TABLET, FILM COATED ORAL at 08:35

## 2021-07-07 RX ADMIN — ASPIRIN 81 MG: 81 TABLET, COATED ORAL at 08:35

## 2021-07-07 RX ADMIN — METOPROLOL SUCCINATE 25 MG: 25 TABLET, EXTENDED RELEASE ORAL at 18:59

## 2021-07-07 RX ADMIN — LORAZEPAM 0.5 MG: 0.5 TABLET ORAL at 16:23

## 2021-07-07 RX ADMIN — LORAZEPAM 0.5 MG: 0.5 TABLET ORAL at 08:35

## 2021-07-07 RX ADMIN — LEVOTHYROXINE SODIUM 112 MCG: 0.11 TABLET ORAL at 06:21

## 2021-07-07 RX ADMIN — PANTOPRAZOLE SODIUM 40 MG: 40 TABLET, DELAYED RELEASE ORAL at 06:22

## 2021-07-07 RX ADMIN — Medication 10 ML: at 20:47

## 2021-07-07 ASSESSMENT — PAIN SCALES - GENERAL
PAINLEVEL_OUTOF10: 0

## 2021-07-07 NOTE — TELEPHONE ENCOUNTER
litzy was changed to Tirosn 112 mcg it made her hyperthyroidism  made her go in to  a-fib,  The Tirosn   88 made her hypothyroidism. She stated you talked about going to 100 mcg, she would like to try the 100mcg. She was also question her parathyroid, she feels like it is off and be causing a lot of her problems she wanted to know if there is any test she can get to see it that's the problem.

## 2021-07-07 NOTE — PROGRESS NOTES
Grant Sandy is a 46 y.o. right handed female     Neuro is following for tingling and headaches    PMH: A. fib, HTN, thyroid disease, gastric bypass    Synopsis:  Presented with tingling in the right side of her face, right neck, right shoulder, and right upper back--with twitching of right upper back--beginning a day prior. NIHSS 0. /119 in the ER. Given Ativan in the ER with improvement of her symptoms. She had a spell of A. fib prior to the onset of her symptoms. She is on Eliquis. Pulsating vessel was found over her right eye on a neurology consult. She has [facility] evaluation for possible POTS    No significant stenosis or other findings on MRI of the cervical spine. MRA of the was done and radiologist questioned a small filling defect in her LEFT transverse sinus. Her headaches and pulsatile tinnitus were on the right side of her head and retro-orbital region--and have not reoccurred. No further paresthesias. No vision changes. B12 and folate were normal (she just had a B12 shot a week ago), Sjogren's antibodies were normal.  Methylmalonic acid and B1 levels are pending.     There is no family present she is otherwise medically stable with improved blood pressures    Current Facility-Administered Medications   Medication Dose Route Frequency Provider Last Rate Last Admin    [START ON 7/8/2021] Levothyroxine Sodium CAPS 112 mcg  112 mcg Oral Daily Mariela Miller MD        acetaminophen (TYLENOL) tablet 650 mg  650 mg Oral Q6H PRN Monty Ag MD   650 mg at 07/06/21 0935    apixaban (ELIQUIS) tablet 5 mg  5 mg Oral BID Mariela Miller MD   5 mg at 07/07/21 2681    LORazepam (ATIVAN) tablet 0.5 mg  0.5 mg Oral BID Mariela Miller MD   0.5 mg at 07/07/21 0835    pantoprazole (PROTONIX) tablet 40 mg  40 mg Oral QAM AC Mariela Miller MD   40 mg at 07/07/21 0439    metoprolol succinate (TOPROL XL) extended release tablet 25 mg  25 mg Oral QPM Mariela Miller MD       Jasmina Her sodium chloride flush 0.9 % injection 5-40 mL  5-40 mL Intravenous 2 times per day Yosef Langston MD   10 mL at 07/07/21 0835    sodium chloride flush 0.9 % injection 5-40 mL  5-40 mL Intravenous PRN Yosef Langston MD        0.9 % sodium chloride infusion  25 mL Intravenous PRN Yosef Langston MD        ondansetron (ZOFRAN-ODT) disintegrating tablet 4 mg  4 mg Oral Q8H PRN Yosef Langston MD        Or    ondansetron [facility]) injection 4 mg  4 mg Intravenous Q6H PRN Yosef Langston MD        polyethylene glycol [facility]) packet 17 g  17 g Oral Daily PRN Yosef Langston MD        aspirin EC tablet 81 mg  81 mg Oral Daily Yosef Langston MD   81 mg at 07/07/21 5480    Or    aspirin suppository 300 mg  300 mg Rectal Daily Yosef Langston MD        atorvastatin (LIPITOR) tablet 40 mg  40 mg Oral Nightly Yosef Langston MD         Objective:     /72   Pulse 74   Temp 97.2 °F (36.2 °C) (Temporal)   Resp 18   Ht 5' 3.5\" (1.613 m)   Wt 250 lb (113.4 kg)   SpO2 94%   BMI 43.59 kg/m²     General appearance: alert, appears stated age, cooperative and no distress  Head: normocephalic, without obvious abnormality, atraumatic  Eyes: conjunctivae/corneas clear--fundi not well-visualized  Neck: no carotid bruit--spasticity to cervical paraspinals and trapezius  Lungs: clear to auscultation bilaterally  Heart: regular rate and rhythm--NSR  Extremities: normal, atraumatic, no cyanosis or edema  Pulses: 2+ and symmetric  Skin: color, texture, turgor normal---no rashes or lesions      Mental Status: Alert, oriented x4    Appropriate attention/concentration  Intact fundus of knowledge  Intact memories    Speech: no dysarthria  Language: no aphasias    Cranial Nerves:  I: smell NA   II: visual acuity  NA   II: visual fields Full to confrontation   II: pupils CEZAR   III,VII: ptosis None   III,IV,VI: extraocular muscles  Full ROM   V: mastication Normal   V: facial light touch sensation  Normal   V,VII: corneal reflex     VII: facial muscle function - upper  Normal   VII: facial muscle function - lower Normal   VIII: hearing Normal   IX: soft palate elevation  Normal   IX,X: gag reflex    XI: trapezius strength  5/5   XI: sternocleidomastoid strength 5/5   XI: neck extension strength  5/5   XII: tongue strength  Normal     Motor:  5/5 throughout  Normal bulk and tone  No drift or abnormal movement    Sensory:  LT normal in all limbs    Coordination:   FN, FFM, ASHWIN normal    DTR:   No Hayward's    No pathological reflexes    Laboratory/Radiology:     CBC with Differential:    Lab Results   Component Value Date    WBC 4.4 07/06/2021    RBC 4.40 07/06/2021    HGB 13.5 07/06/2021    HCT 42.1 07/06/2021     07/06/2021    MCV 95.7 07/06/2021    MCH 30.7 07/06/2021    MCHC 32.1 07/06/2021    RDW 14.4 07/06/2021    SEGSPCT 63 04/27/2013    LYMPHOPCT 19.6 07/05/2021    MONOPCT 4.8 07/05/2021    BASOPCT 0.7 07/05/2021    MONOSABS 0.34 07/05/2021    LYMPHSABS 1.39 07/05/2021    EOSABS 0.17 07/05/2021    BASOSABS 0.05 07/05/2021     CMP:    Lab Results   Component Value Date     07/06/2021    K 4.1 07/06/2021     07/06/2021    CO2 28 07/06/2021    BUN 9 07/06/2021    CREATININE 0.7 07/06/2021    GFRAA >60 07/06/2021    LABGLOM >60 07/06/2021    GLUCOSE 90 07/06/2021    GLUCOSE 88 12/08/2010    PROT 7.8 07/05/2021    LABALBU 4.5 07/05/2021    LABALBU 4.1 12/08/2010    CALCIUM 9.2 07/06/2021    BILITOT 0.5 07/05/2021    ALKPHOS 108 07/05/2021    AST 20 07/05/2021    ALT 13 07/05/2021     HgBA1c:    Lab Results   Component Value Date    LABA1C 4.8 07/06/2021     TSH:    Lab Results   Component Value Date    TSH 6.840 06/06/2021     FOLATE:    Lab Results   Component Value Date    FOLATE 13.7 07/06/2021     B12 normal    B1 pending    Methylamonic acid pending    Sjogrens atb pending    MRI cervical spine: Mild multilevel degenerative disc disease without canal stenosis or foraminal narrowing. MRV head: Questionable small filling defect in the left transverse sinus as described above. This is of uncertain significance. Correlation with CT venography could be considered if clinically indicated to exclude the possibility of thrombus. Otherwise, unremarkable MRV of the head     All labs and images personally reviewed today    Assessment:     R sided paresthesias and headache: most likely due to accelerated HTN and PAF. No evidence of cervical stenosis. Possible absorption issues of B12 vs other vitamin deficiencies contributing. With presence of pulsatile tinnitus and questionable filling defect left in her transverse sinus seen MRV, will get CTV of the head before discharge--- though this is contralateral to her symptoms, and there is low suspicion for thrombus or occlusion.  Her exam is normal and symptoms resolved    Plan:     Reviewed MRV with Dr. Elena Sanchez    CTV head--if neg, pt can be discharged from neuro standpoint    Control BP    PCP can follow up final labs as OP if needed    Pt has eval for possible POTS planned at [facility]    Likely no need for neuro follow up as OP    ROSS Villar - CNP,   1:32 PM  7/7/2021

## 2021-07-07 NOTE — PROGRESS NOTES
Hospitalist Progress Note      SYNOPSIS: Patient admitted on  59-year-old female history of paroxysmal atrial fibrillation on Eliquis, anxiety, fibromyalgia, hypothyroidism presents to ED with tingling sensation. Patient is evaluated in the emergency department at [address] with paresthesias and headache. Patient notes intermittent paresthesias. Started yesterday with bilateral shoulder tingling. She notes that today she has had some right-sided facial tingling. Lasting for minutes no exacerbation or relief. this was associated with transient right neck and head pain. Lasting for minutes no exacerbation relief. Patient also reports episode of A. fib this morning when she woke up. She states A. fib is a new diagnosis she has been in A. fib a couple times now. She is on Eliquis. Patient denies history of stroke or migraine. She states that she thinks she has POTS and will be going to the [facility] for evaluation. Denies chest pain or trouble breathing. No speech or vision changes. Patient does report she has some difficulty swallowing today      SUBJECTIVE:    Patient seen and examined  Records reviewed. MRV head and MRI of cervical spine are currently pending. No new complaints today  Patient inquiring but B vitamin levels again. Reported B12 and MMA levels. She is vitamin D deficient which we will replace. Stable overnight. No other overnight issues reported. Temp (24hrs), Av.9 °F (36.6 °C), Min:97.7 °F (36.5 °C), Max:98.2 °F (36.8 °C)    DIET: ADULT DIET;  Regular; Low Fat/Low Chol/High Fiber/BEVERLEY  CODE: Full Code    Intake/Output Summary (Last 24 hours) at 2021 0814  Last data filed at 2021 1802  Gross per 24 hour   Intake 360 ml   Output    Net 360 ml       OBJECTIVE:    /82   Pulse 58   Temp 97.8 °F (36.6 °C) (Temporal)   Resp 16   Ht 5' 3.5\" (1.613 m)   Wt 250 lb (113.4 kg)   SpO2 97%   BMI 43.59 kg/m²     General appearance: No apparent distress, appears stated age and cooperative. HEENT:  Conjunctivae/corneas clear. Neck: Supple. No jugular venous distention. Respiratory: Clear to auscultation bilaterally, normal respiratory effort  Cardiovascular: Regular rate rhythm, normal S1-S2  Abdomen: Soft, nontender, nondistended  Musculoskeletal: No clubbing, cyanosis, no bilateral lower extremity edema. Brisk capillary refill. Skin:  No rashes  on visible skin  Neurologic: awake, alert and following commands     ASSESSMENT:    Paresthesias of upper back  Hypothyroidism  Fibromyalgia  Anxiety  Class III morbid obesity with a BMI of 48  History of atrial fibrillation  [place]     PLAN:    MRI brain is unremarkable. MR we had an MRI cervical spine is currently pending. Vitamin D deficient. Replete. Vitamin D, PTH, Sjogren's, MMA are currently pending  B12 folate unremarkable. . A1c is 4.8 Discussed BMP and CBC. B12 within normal limits. There is no anemia. Follow MMA  CT head upon admission was unremarkable. Appreciate neurology consultation. PT OT evals on going    DISPOSITION: Pending further neurological work-up.   Likely home today    Medications:  REVIEWED DAILY    Infusion Medications    sodium chloride       Scheduled Medications    apixaban  5 mg Oral BID    LORazepam  0.5 mg Oral BID    pantoprazole  40 mg Oral QAM AC    metoprolol succinate  25 mg Oral QPM    levothyroxine  112 mcg Oral QAM AC    sodium chloride flush  5-40 mL Intravenous 2 times per day    aspirin  81 mg Oral Daily    Or    aspirin  300 mg Rectal Daily    atorvastatin  40 mg Oral Nightly     PRN Meds: acetaminophen, sodium chloride flush, sodium chloride, ondansetron **OR** ondansetron, polyethylene glycol    Labs:     Recent Labs     07/05/21  1238 07/06/21  0559   WBC 7.1 4.4*   HGB 14.1 13.5   HCT 43.5 42.1    275       Recent Labs     07/05/21  1238 07/06/21  0559    139   K 4.2 4.1    100   CO2 29 28   BUN 9 9 CREATININE 0.7 0.7   CALCIUM 9.4 9.2       Recent Labs     07/05/21  1238   PROT 7.8   ALKPHOS 108*   ALT 13   AST 20   BILITOT 0.5       No results for input(s): INR in the last 72 hours. No results for input(s): Ning Comment in the last 72 hours. Chronic labs:    Lab Results   Component Value Date    CHOL 218 (H) 07/06/2021    TRIG 95 07/06/2021    HDL 77 07/06/2021    LDLCALC 122 (H) 07/06/2021    TSH 6.840 (H) 06/06/2021    INR 1.1 06/06/2021    LABA1C 4.8 07/06/2021       Radiology: REVIEWED DAILY    +++++++++++++++++++++++++++++++++++++++++++++++++  Gertrudis Phoenix MD  [facility] - [address] [place] [address]  +++++++++++++++++++++++++++++++++++++++++++++++++  NOTE: This report was transcribed using voice recognition software. Every effort was made to ensure accuracy; however, inadvertent computerized transcription errors may be present.

## 2021-07-08 ENCOUNTER — TELEPHONE (OUTPATIENT)
Dept: ENDOCRINOLOGY | Age: 51
End: 2021-07-08

## 2021-07-08 VITALS
WEIGHT: 250 LBS | SYSTOLIC BLOOD PRESSURE: 128 MMHG | OXYGEN SATURATION: 100 % | HEIGHT: 64 IN | HEART RATE: 66 BPM | RESPIRATION RATE: 22 BRPM | BODY MASS INDEX: 42.68 KG/M2 | DIASTOLIC BLOOD PRESSURE: 80 MMHG | TEMPERATURE: 97.6 F

## 2021-07-08 DIAGNOSIS — E03.9 HYPOTHYROIDISM, UNSPECIFIED TYPE: ICD-10-CM

## 2021-07-08 PROCEDURE — 2580000003 HC RX 258: Performed by: INTERNAL MEDICINE

## 2021-07-08 PROCEDURE — 99226 PR SBSQ OBSERVATION CARE/DAY 35 MINUTES: CPT | Performed by: NURSE PRACTITIONER

## 2021-07-08 PROCEDURE — 6370000000 HC RX 637 (ALT 250 FOR IP): Performed by: FAMILY MEDICINE

## 2021-07-08 PROCEDURE — 6370000000 HC RX 637 (ALT 250 FOR IP): Performed by: INTERNAL MEDICINE

## 2021-07-08 PROCEDURE — 6360000002 HC RX W HCPCS: Performed by: FAMILY MEDICINE

## 2021-07-08 PROCEDURE — G0378 HOSPITAL OBSERVATION PER HR: HCPCS

## 2021-07-08 PROCEDURE — 96372 THER/PROPH/DIAG INJ SC/IM: CPT

## 2021-07-08 RX ORDER — POLYVINYL ALCOHOL 14 MG/ML
1 SOLUTION/ DROPS OPHTHALMIC 3 TIMES DAILY PRN
Status: DISCONTINUED | OUTPATIENT
Start: 2021-07-08 | End: 2021-07-08 | Stop reason: HOSPADM

## 2021-07-08 RX ORDER — LEVOTHYROXINE SODIUM 100 UG/1
CAPSULE ORAL
Qty: 30 CAPSULE | Refills: 3 | Status: SHIPPED
Start: 2021-07-08 | End: 2021-09-07 | Stop reason: SDUPTHER

## 2021-07-08 RX ORDER — CYANOCOBALAMIN 1000 UG/ML
1000 INJECTION INTRAMUSCULAR; SUBCUTANEOUS ONCE
Status: COMPLETED | OUTPATIENT
Start: 2021-07-08 | End: 2021-07-08

## 2021-07-08 RX ADMIN — CYANOCOBALAMIN 1000 MCG: 1000 INJECTION, SOLUTION INTRAMUSCULAR; SUBCUTANEOUS at 11:58

## 2021-07-08 RX ADMIN — ASPIRIN 81 MG: 81 TABLET, COATED ORAL at 08:31

## 2021-07-08 RX ADMIN — LEVOTHYROXINE SODIUM 112 MCG: 112 CAPSULE ORAL at 05:59

## 2021-07-08 RX ADMIN — LORAZEPAM 0.5 MG: 0.5 TABLET ORAL at 08:31

## 2021-07-08 RX ADMIN — PANTOPRAZOLE SODIUM 40 MG: 40 TABLET, DELAYED RELEASE ORAL at 05:59

## 2021-07-08 RX ADMIN — APIXABAN 5 MG: 5 TABLET, FILM COATED ORAL at 08:30

## 2021-07-08 RX ADMIN — Medication 10 ML: at 08:31

## 2021-07-08 ASSESSMENT — PAIN SCALES - GENERAL
PAINLEVEL_OUTOF10: 0
PAINLEVEL_OUTOF10: 0

## 2021-07-08 NOTE — PLAN OF CARE
Problem: Pain:  Goal: Pain level will decrease  7/8/2021 0432 by Mary Cardenas RN  Outcome: Met This Shift  7/7/2021 2323 by Charmayne Galloway, RN  Outcome: Met This Shift  Goal: Control of acute pain  7/8/2021 0432 by Mary Cardenas RN  Outcome: Met This Shift  7/7/2021 2323 by Charmayne Galloway, RN  Outcome: Met This Shift  Goal: Control of chronic pain  7/8/2021 0432 by Mary Cardenas RN  Outcome: Met This Shift  7/7/2021 2323 by Charmayne Galloway, RN  Outcome: Met This Shift

## 2021-07-08 NOTE — TELEPHONE ENCOUNTER
She was also question her parathyroid, she feels like it is off and be causing a lot of her problems she wanted to know if there is any test she can get to see it that's the problem.

## 2021-07-08 NOTE — PLAN OF CARE
Problem: HEMODYNAMIC STATUS  Goal: Patient has stable vital signs and fluid balance  Outcome: Met This Shift     Problem: ACTIVITY INTOLERANCE/IMPAIRED MOBILITY  Goal: Mobility/activity is maintained at optimum level for patient  Outcome: Met This Shift     Problem: COMMUNICATION IMPAIRMENT  Goal: Ability to express needs and understand communication  Outcome: Met This Shift     Problem: Pain:  Goal: Pain level will decrease  Description: Pain level will decrease  Outcome: Met This Shift  Goal: Control of acute pain  Description: Control of acute pain  Outcome: Met This Shift  Goal: Control of chronic pain  Description: Control of chronic pain  Outcome: Met This Shift

## 2021-07-10 LAB — METHYLMALONIC ACID: 0.14 UMOL/L (ref 0–0.4)

## 2021-07-11 LAB — VITAMIN B1 WHOLE BLOOD: 132 NMOL/L (ref 70–180)

## 2021-07-13 ENCOUNTER — HOSPITAL ENCOUNTER (OUTPATIENT)
Dept: NON INVASIVE DIAGNOSTICS | Age: 51
Discharge: HOME | End: 2021-07-13
Payer: COMMERCIAL

## 2021-07-13 DIAGNOSIS — R00.2 PALPITATIONS: ICD-10-CM

## 2021-07-13 LAB
LV EF: 65 %
LVEF MODALITY: NORMAL

## 2021-07-13 PROCEDURE — 93306 TTE W/DOPPLER COMPLETE: CPT

## 2021-07-14 ENCOUNTER — TELEPHONE (OUTPATIENT)
Dept: NON INVASIVE DIAGNOSTICS | Age: 51
End: 2021-07-14

## 2021-07-14 ENCOUNTER — TELEPHONE (OUTPATIENT)
Dept: ENDOCRINOLOGY | Age: 51
End: 2021-07-14

## 2021-07-14 DIAGNOSIS — R00.2 PALPITATIONS: ICD-10-CM

## 2021-07-14 NOTE — TELEPHONE ENCOUNTER
----- Message from Mary Baker RN sent at 7/14/2021  1:48 PM EDT -----  Spoke to the patient and gave her TTE results from Dr. Akilah Tariq. While on the phone she reports that her HR is low in the 40's at rest with shallow breathing and low respirations. She states she had not been taking the lopressor due to bradycardia. When she gets up her HR goes into 170's and rapidly decreases to high 40's. Also she is not taking the the prescribed ativan due to bradycardia. Her PCP told her to ask you about when she can take her ativan due to bradycardia. She has a appointment with the [facility] for POTS. Thanks and please advise.   ----- Message -----  From: Isdioro Lewis  Sent: 7/14/2021   1:47 PM EDT  To: Mary Baker RN      ----- Message -----  From: Main Ball MD  Sent: 7/14/2021   1:15 PM EDT  To: Isidoro Lewis    Monitor showed some brief episodes of heart fluttering. Ok to try metoprolol tartrate 12.5mg bid and see how she feels on this.  Pls notify us within a week of starting it how she feels

## 2021-07-14 NOTE — TELEPHONE ENCOUNTER
The pt left a message on the clinical line. She stated that she hasn't been able to sleep x3days, her heart rate has been in the 40's at times, and she feels pressure on her chest that makes it feel hard to breath. I called the pt back with NA. I left a voicemail telling her to call her cardiologist as well. I requested that she return my call so I can obtain more details.

## 2021-07-14 NOTE — TELEPHONE ENCOUNTER
----- Message from Marilee Ruiz DO sent at 7/14/2021 12:43 PM EDT -----  Regarding: echo  Patient evaluated for AF during admit. Recommend outpatient TTE, apixaban 5 mg BID, metoprolol 25 mg AM and 12.5 mg PM, diet and exercise, sleep study, weight loss. Please let her know echo was grossly normal. How is she doing in regards to the other issues and medications?  She is to follow-up with Dr Laisha Martinez

## 2021-07-15 ENCOUNTER — TELEPHONE (OUTPATIENT)
Dept: ENDOCRINOLOGY | Age: 51
End: 2021-07-15

## 2021-07-15 DIAGNOSIS — E16.2 HYPOGLYCEMIA: Primary | ICD-10-CM

## 2021-07-15 NOTE — TELEPHONE ENCOUNTER
Likely reactive hypoglycemia from previous gastric bypass surgery     Lance is not very accurate in low BS. Please do finger stick at the time of low BS and send me readings    Lab order is in.  Please make sure to do it fasting in the morning

## 2021-07-15 NOTE — TELEPHONE ENCOUNTER
Patient called in request possible labs for her pancreas due to not feeling well. Her  put one of his concepcion sensors on her because her blood sugars have been going low, in the 50's and 80's at night while sleeping and sometimes during day. Her last A1c was 4.8% and she has no DM diagnosis. She has resent labs from 7/6/21 in but stated she needs to go get her TSH and T4 free labs; if you could order liver and pancreas labs at the same time. Other symptoms she is having are: nausea, weight lose, at night waking up with foam in her mouth, the low BS at night, fatigue. Please advise, thank you!

## 2021-07-27 LAB
A/G RATIO: NORMAL
ALBUMIN SERPL-MCNC: 4 G/DL
ALP BLD-CCNC: NORMAL U/L
ALT SERPL-CCNC: 13 U/L
AST SERPL-CCNC: 20 U/L
BILIRUB SERPL-MCNC: 0.4 MG/DL (ref 0.1–1.4)
BILIRUBIN DIRECT: NORMAL
BILIRUBIN, INDIRECT: NORMAL
BUN BLDV-MCNC: NORMAL MG/DL
C-PEPTIDE: 1.5
CALCIUM SERPL-MCNC: 9.3 MG/DL
CHLORIDE BLD-SCNC: 102 MMOL/L
CO2: NORMAL
CREAT SERPL-MCNC: 0.66 MG/DL
GFR CALCULATED: 60
GLOBULIN: NORMAL
GLUCOSE BLD-MCNC: NORMAL MG/DL
POTASSIUM SERPL-SCNC: 4.6 MMOL/L
PROTEIN TOTAL: 6.8 G/DL
SODIUM BLD-SCNC: 138 MMOL/L

## 2021-07-29 ENCOUNTER — TELEPHONE (OUTPATIENT)
Dept: ENDOCRINOLOGY | Age: 51
End: 2021-07-29

## 2021-07-29 DIAGNOSIS — E16.2 HYPOGLYCEMIA: ICD-10-CM

## 2021-07-29 DIAGNOSIS — E03.9 HYPOTHYROIDISM, UNSPECIFIED TYPE: Primary | ICD-10-CM

## 2021-08-09 ENCOUNTER — TELEPHONE (OUTPATIENT)
Dept: ENDOCRINOLOGY | Age: 51
End: 2021-08-09

## 2021-08-09 NOTE — TELEPHONE ENCOUNTER
Notify pt,  I have reviewed your recent results    Thyroid hormones still low.  Please check is she was missing any doses and confirm he current dose of thyroid medication

## 2021-08-19 ENCOUNTER — TELEPHONE (OUTPATIENT)
Dept: ENDOCRINOLOGY | Age: 51
End: 2021-08-19

## 2021-08-19 DIAGNOSIS — E03.9 HYPOTHYROIDISM, UNSPECIFIED TYPE: Primary | ICD-10-CM

## 2021-08-19 NOTE — TELEPHONE ENCOUNTER
There is really no need to check T3 in hypothyroid patient.  Checking T3 level is not going to add any benefits and we don't use T3 to adjust Synthroid level    There is also a good chance that your insurance will not cover T3 in your case and you will be responsible for the cost

## 2021-09-03 ENCOUNTER — TELEPHONE (OUTPATIENT)
Dept: ENDOCRINOLOGY | Age: 51
End: 2021-09-03

## 2021-09-03 NOTE — TELEPHONE ENCOUNTER
Called patient to watch her carbs sweets and starches and simple carbs   Patient was worried about her labs make patient an vv apt

## 2021-09-07 ENCOUNTER — VIRTUAL VISIT (OUTPATIENT)
Dept: ENDOCRINOLOGY | Age: 51
End: 2021-09-07
Payer: COMMERCIAL

## 2021-09-07 DIAGNOSIS — E03.9 HYPOTHYROIDISM, UNSPECIFIED TYPE: Primary | ICD-10-CM

## 2021-09-07 DIAGNOSIS — Z98.84 HX OF GASTRIC BYPASS: ICD-10-CM

## 2021-09-07 DIAGNOSIS — E04.2 MULTINODULAR GOITER: ICD-10-CM

## 2021-09-07 PROCEDURE — 99214 OFFICE O/P EST MOD 30 MIN: CPT | Performed by: INTERNAL MEDICINE

## 2021-09-07 RX ORDER — LEVOTHYROXINE SODIUM 100 UG/1
CAPSULE ORAL
Qty: 30 CAPSULE | Refills: 5 | Status: SHIPPED
Start: 2021-09-07 | End: 2021-11-04

## 2021-09-07 RX ORDER — LEVOTHYROXINE SODIUM 88 UG/1
CAPSULE ORAL DAILY
Qty: 30 CAPSULE | Status: CANCELLED | OUTPATIENT
Start: 2021-09-07

## 2021-09-07 NOTE — PROGRESS NOTES
Maribell Villalta was read the following message We want to confirm that, for purposes of billing, this is a virtual visit with your provider for which we will submit a claim for reimbursement with your insurance company. You will be responsible for any copays, coinsurance amounts or other amounts not covered by your insurance company. If you do not accept this, unfortunately we will not be able to schedule or proceed with a virtual visit with the provider. Do you accept? Martha responded Yes .

## 2021-09-07 NOTE — PROGRESS NOTES
[address] [place] [address] Department of Endocrinology Diabetes and Metabolism   [address] [facility] [address]   Phone: [phone]  Fax: [phone]    Date of Service: 9/7/2021  Primary Care Physician: Lindy Hernandez DO  Provider: Marilynn Tompkins MD        Reason for the visit:  Primary Hypothyroidism    History of Present Illness: The history is provided by the patient. No  was used. Accuracy of the patient data is excellent. Ivy Parisi is a very pleasant 46 y.o. female seen today for follow up visit   The patent was diagnosed with hypothyroidism in her early 19's. She is currently on Tirosint 100 mcg daily. Patient takes Tirosint in the morning at empty stomach, wait one hour before eating , avoid multivitamins containing calcium  or iron with it. TSH was high in 8/2021 but she admit missing doses   Lab Results   Component Value Date/Time    TSH 18.600 08/05/2021 12:00 AM    T4FREE 1.2 08/05/2021 12:00 AM    Y6OFMLO 8.4 06/06/2021 02:02 AM    P0UWYPG 82.29 06/06/2021 02:02 AM    TSI <0.10 10/03/2020 08:12 AM    TPOABS 2.6 10/03/2020 08:12 AM    THGAB <0.9 10/03/2020 08:12 AM     She wasn't able to tolerate Levothyroxine, name brand synthroid    She had gastric bypass surgery about 16 years ago and since then she has been struggling to maintain normal  thyroid level     MNG   Thyroid US 10/2019   Atrophic heterogenous  thyroid gland   Rt lobe measures 2.6 x 0.7 x 0.7 cm, Lt lobe measures 2.1 x \0.8 x 0.5 cm, isthmus measures 0.1 cm   Non specific calcification in interpolar region measures 0.2 cm      Thyroid US 12/2020 --> tiny 2 and 2 mm thyroid nodules     Martha Johnson reported some discomfort at thyroid level but denies any voice change, or shortness of breath. No family history of thyroid cancer. No prior history of radiation to head or neck region.     PAST MEDICAL HISTORY   Past Medical History:   Diagnosis Date    Atrial fibrillation ([address]     MCH 30.7 07/06/2021 05:59 AM    MCHC 32.1 07/06/2021 05:59 AM    RDW 14.4 07/06/2021 05:59 AM     07/06/2021 05:59 AM    MPV 10.6 07/06/2021 05:59 AM      Lab Results   Component Value Date/Time     07/27/2021 12:00 AM    K 4.6 07/27/2021 12:00 AM    K 4.1 07/06/2021 05:59 AM    CO2 28 07/06/2021 05:59 AM    BUN 9 07/06/2021 05:59 AM    CREATININE 0.66 07/27/2021 12:00 AM    CALCIUM 9.3 07/27/2021 12:00 AM    LABGLOM 60 07/27/2021 12:00 AM    LABGLOM >60 07/06/2021 05:59 AM    GFRAA >60 07/06/2021 05:59 AM      Lab Results   Component Value Date/Time    TSH 18.600 08/05/2021 12:00 AM    T4FREE 1.2 08/05/2021 12:00 AM    H9CITZD 8.4 06/06/2021 02:02 AM    L8VGWNF 82.29 06/06/2021 02:02 AM    TSI <0.10 10/03/2020 08:12 AM    TPOABS 2.6 10/03/2020 08:12 AM    THGAB <0.9 10/03/2020 08:12 AM     Lab Results   Component Value Date    LABA1C 4.8 07/06/2021    GLUCOSE 90 07/06/2021    GLUCOSE 88 12/08/2010     Lab Results   Component Value Date    TRIG 95 07/06/2021    HDL 77 07/06/2021    LDLCALC 122 07/06/2021    CHOL 218 07/06/2021     Lab Results   Component Value Date    VITD25 21 07/06/2021    VITD25 32 05/28/2021     ASSESSMENT & RECOMMENDATIONS   Martha Jasso, a 46 y.o.-old female seen in for following issues     Primary hypothyroidism   · I had a long discussion with pt and encouraged he take Tirosin every morning at empty stomach, wait one hour before eating , avoid multivitamins containing calcium  or iron with it. · On Tirosint 100 mcg daily   · TFT in 6 weeks   · Discussed the risk of cardiac arrhythmia and loss of bone density with over treating hypothyroidism   · The had gastric bypass surgery about 16 years ago and since then she has been struggling to maintain normal  thyroid level     VitD deficiency  · Continue vitD supplement      MNG   · I have reviewed previous thyroid US images myself (10/2019, 12/2020). Thyroid gland is very heterogenous and atrophic without any discrete nodules. Finding very consistent with chronic thyroiditis. Non specific tiny calcification seen in interpolar region measures 0.2 cm and nodule measuring 3 mm    · Continue monitoring with periodic US     I personally reviewed external notes from PCP and other patient's care team providers, and personally interpreted labs associated with the above diagnosis. I also ordered labs to further assess and manage the above addressed medical conditions    Return in about 6 months (around 3/7/2022) for hypothyroidism, VitD deficiency. The above issues were reviewed with the patient who understood and agreed with the plan. Thank you for allowing us to participate in the care of this patient. Please do not hesitate to contact us with any additional questions. Diagnosis Orders   1. Hypothyroidism, unspecified type  TSH without Reflex    T4, Free   2. Multinodular goiter     3. Hx of gastric bypass         Sil Pink MD  Endocrinologist, [facility] Diabetes Care and Endocrinology   [address] [facility] [address]   Phone: [phone]  Fax: [phone]  ------------------------------  An electronic signature was used to authenticate this note.  Adilene Pike MD on 9/7/2021 at 2:08 PM

## 2021-09-10 PROBLEM — E04.2 MULTINODULAR GOITER: Status: ACTIVE | Noted: 2021-09-10

## 2021-09-24 ENCOUNTER — TELEPHONE (OUTPATIENT)
Dept: ENDOCRINOLOGY | Age: 51
End: 2021-09-24

## 2021-10-07 ENCOUNTER — OFFICE VISIT (OUTPATIENT)
Dept: ENDOCRINOLOGY | Age: 51
End: 2021-10-07
Payer: COMMERCIAL

## 2021-10-07 VITALS
BODY MASS INDEX: 45.89 KG/M2 | HEART RATE: 62 BPM | WEIGHT: 259 LBS | HEIGHT: 63 IN | DIASTOLIC BLOOD PRESSURE: 85 MMHG | SYSTOLIC BLOOD PRESSURE: 126 MMHG

## 2021-10-07 DIAGNOSIS — E04.2 MULTINODULAR GOITER: ICD-10-CM

## 2021-10-07 DIAGNOSIS — E03.9 HYPOTHYROIDISM, UNSPECIFIED TYPE: Primary | ICD-10-CM

## 2021-10-07 DIAGNOSIS — E16.2 HYPOGLYCEMIA: ICD-10-CM

## 2021-10-07 PROCEDURE — 99214 OFFICE O/P EST MOD 30 MIN: CPT | Performed by: INTERNAL MEDICINE

## 2021-10-07 RX ORDER — LEVOTHYROXINE SODIUM 13 UG/1
13 CAPSULE ORAL DAILY
Qty: 4 CAPSULE | Refills: 11 | Status: SHIPPED
Start: 2021-10-07 | End: 2021-11-04

## 2021-10-07 NOTE — PROGRESS NOTES
[address] [facility] Department of Endocrinology Diabetes and Metabolism   [address] [facility] [address]   Phone: [phone]  Fax: [phone]    Date of Service: 10/7/2021  Primary Care Physician: Alfred Carlson DO  Provider: Marcelina Polanco MD        Reason for the visit:  Primary Hypothyroidism    History of Present Illness: The history is provided by the patient. No  was used. Accuracy of the patient data is excellent. Alesha Marshall is a very pleasant 46 y.o. female seen today for follow up visit   The patent was diagnosed with hypothyroidism in her early 19's. She is currently on Tirosint 100 mcg daily. Patient takes Tirosint in the morning at empty stomach, wait one hour before eating , avoid multivitamins containing calcium  or iron with it. Pt has h/o gastric bypass surgery 16 years ago and since then she has been struggling to maintain normal  thyroid level   TSH has been running high with mid normal FT4   Lab Results   Component Value Date/Time    TSH 22.300 09/27/2021 12:00 AM    T4FREE 1.3 09/27/2021 12:00 AM    X5RHJPK 8.4 06/06/2021 02:02 AM    K9TMPLP 82.29 06/06/2021 02:02 AM    TSI <0.10 10/03/2020 08:12 AM    TPOABS 2.6 10/03/2020 08:12 AM    THGAB <0.9 10/03/2020 08:12 AM     She wasn't able to tolerate Levothyroxine, name brand synthroid    MNG   Thyroid US 10/2019   Atrophic heterogenous  thyroid gland   Rt lobe measures 2.6 x 0.7 x 0.7 cm, Lt lobe measures 2.1 x \0.8 x 0.5 cm, isthmus measures 0.1 cm   Non specific calcification in interpolar region measures 0.2 cm      Thyroid US 12/2020 --> tiny 2 and 2 mm thyroid nodules     Martha ROSEMARY Velázquezshin Silva reported some discomfort at thyroid level but denies any voice change, or shortness of breath. No family history of thyroid cancer. No prior history of radiation to head or neck region.       PAST MEDICAL HISTORY   Past Medical History:   Diagnosis Date    Atrial fibrillation ([address]     Obesity  Prolonged emergence from general anesthesia     SENSITIVE TO MEDS, TAKES LESS TO PUT HER UNDER, DIFFICULTY WAKING, STOPS BREATHING PER PATIENT    Thyroid disease        PAST SURGICAL HISTORY   Past Surgical History:   Procedure Laterality Date    ABDOMEN SURGERY       SECTION      x2    CHOLECYSTECTOMY      COLONOSCOPY N/A 2020    COLORECTAL CANCER SCREENING, HIGH RISK performed by Terrance Carter MD at [address]      UPPER GASTROINTESTINAL ENDOSCOPY N/A 2020    EGD BIOPSY performed by Terrance Carter MD at [address]   Tobacco:   reports that she has never smoked. She has never used smokeless tobacco.  Alcohol:   reports no history of alcohol use. Drugs:   reports no history of drug use. FAMILY HISTORY   No family history on file. ALLERGIES AND DRUG REACTIONS   Allergies   Allergen Reactions    Avelox [Moxifloxacin Hcl In Nacl]     Cephalexin     Ciprofloxacin     Clindamycin     Coffee Bean Extract [Coffea Arabica]     Magnesium Sulfate     Milk-Related Compounds     Penicillins     Percocet [Oxycodone-Acetaminophen]     Sulfa Antibiotics        CURRENT MEDICATIONS   Current Outpatient Medications   Medication Sig Dispense Refill    TIROSINT 100 MCG CAPS Take 1 capsule daily 30 capsule 5    vitamin D3 (CHOLECALCIFEROL) 25 MCG (1000 UT) TABS tablet Take 1 tablet by mouth daily 90 tablet 1    metoprolol tartrate (LOPRESSOR) 25 MG tablet Take 0.5 tablets by mouth every evening 60 tablet 3    apixaban (ELIQUIS) 5 MG TABS tablet Take 1 tablet by mouth 2 times daily 60 tablet 3    omeprazole (PRILOSEC) 20 MG delayed release capsule Take 20 mg by mouth daily Patient states not delayed release      LORazepam (ATIVAN) 0.5 MG tablet lorazepam 0.5 mg tablet       No current facility-administered medications for this visit.      Review of Systems  Constitutional: No fever, no chills, no diaphoresis, no generalized weakness. HEENT: No blurred vision, No sore throat, no ear pain, no hair loss  Neck: denied any neck swelling, difficulty swallowing,   Cadrdiopulomary: No CP, SOB or palpitation, No orthopnea or PND. No cough or wheezing. GI: No N/V/D, no constipation, No abdominal pain, no melena or hematochezia   : Denied any dysuria, hematuria, flank pain, discharge, or incontinence. Skin: denied any rash, ulcer, Hirsute, or hyperpigmentation. MSK: denied any joint deformity, joint pain/swelling, muscle pain, or back pain. Neuro: no numbess, no tingling, no weakness,     OBJECTIVE    /85   Pulse 62   Ht 5' 3\" (1.6 m)   Wt 259 lb (117.5 kg)   BMI 45.88 kg/m²   BP Readings from Last 4 Encounters:   10/07/21 126/85   07/08/21 128/80   06/09/21 120/78   06/07/21 111/75     Wt Readings from Last 6 Encounters:   10/07/21 259 lb (117.5 kg)   07/05/21 250 lb (113.4 kg)   06/09/21 250 lb 3.2 oz (113.5 kg)   06/06/21 250 lb (113.4 kg)   06/02/21 256 lb 12.8 oz (116.5 kg)   01/28/21 252 lb (114.3 kg)       Physical examination:  General: awake alert, oriented x3, no abnormal position or movements. HEENT: normocephalic non traumatic  Neck: supple, no LN enlargement, no thyroid tenderness, no JVD. Pulm: Clear equal air entry no added sounds, no wheezing or rhonchi    CVS: S1 + S2, no murmur, no heave. Dorsalis pedis pulse palpable   Abd: soft lax, no tenderness, no organomegaly, audible bowel sounds. Skin: warm, no lesions, no rash.   Musculoskeletal: No back tenderness, no kyphosis/scoliosis   Neuro: CN intact, sensation normal , muscle power normal.  Psych: normal mood, and affect    Review of Laboratory Data:  I have reviewed the following:  Lab Results   Component Value Date/Time    WBC 4.4 (L) 07/06/2021 05:59 AM    RBC 4.40 07/06/2021 05:59 AM    HGB 13.5 07/06/2021 05:59 AM    HCT 42.1 07/06/2021 05:59 AM    MCV 95.7 07/06/2021 05:59 AM MCH 30.7 07/06/2021 05:59 AM    MCHC 32.1 07/06/2021 05:59 AM    RDW 14.4 07/06/2021 05:59 AM     07/06/2021 05:59 AM    MPV 10.6 07/06/2021 05:59 AM      Lab Results   Component Value Date/Time     07/27/2021 12:00 AM    K 4.6 07/27/2021 12:00 AM    K 4.1 07/06/2021 05:59 AM    CO2 28 07/06/2021 05:59 AM    BUN 9 07/06/2021 05:59 AM    CREATININE 0.66 07/27/2021 12:00 AM    CALCIUM 9.3 07/27/2021 12:00 AM    LABGLOM 60 07/27/2021 12:00 AM    LABGLOM >60 07/06/2021 05:59 AM    GFRAA >60 07/06/2021 05:59 AM      Lab Results   Component Value Date/Time    TSH 22.300 09/27/2021 12:00 AM    T4FREE 1.3 09/27/2021 12:00 AM    L1SESCL 8.4 06/06/2021 02:02 AM    Y7FNVZL 82.29 06/06/2021 02:02 AM    TSI <0.10 10/03/2020 08:12 AM    TPOABS 2.6 10/03/2020 08:12 AM    THGAB <0.9 10/03/2020 08:12 AM     Lab Results   Component Value Date    LABA1C 4.8 07/06/2021    GLUCOSE 90 07/06/2021    GLUCOSE 88 12/08/2010     Lab Results   Component Value Date    TRIG 95 07/06/2021    HDL 77 07/06/2021    LDLCALC 122 07/06/2021    CHOL 218 07/06/2021     Lab Results   Component Value Date    VITD25 21 07/06/2021    VITD25 32 05/28/2021     ASSESSMENT & RECOMMENDATIONS   Martha Baez, a 46 y.o.-old female seen in for following issues     Primary hypothyroidism   · I had a long discussion with pt and encouraged he take Tirosin every morning at empty stomach, wait one hour before eating , avoid multivitamins containing calcium  or iron with it. · Will change Tirosint 100 mcg 6 days a week and 113 mcg on Sunday   · TFT in 4-6 weeks   · Pt has Afib, for this reason will avoid TSH suppression   · The had gastric bypass surgery about 16 years ago and since then she has been struggling to maintain normal  thyroid level     VitD deficiency  · Continue vitD supplement      MNG   · I have reviewed previous thyroid US images myself (10/2019, 12/2020). Thyroid gland is very heterogenous and atrophic without any discrete nodules. Finding very consistent with chronic thyroiditis. Non specific tiny calcification seen in interpolar region measures 0.2 cm and nodule measuring 3 mm    · Continue monitoring with periodic US     I personally reviewed external notes from PCP and other patient's care team providers, and personally interpreted labs associated with the above diagnosis. I also ordered labs to further assess and manage the above addressed medical conditions    Return in about 4 months (around 2/7/2022). The above issues were reviewed with the patient who understood and agreed with the plan. Thank you for allowing us to participate in the care of this patient. Please do not hesitate to contact us with any additional questions. Diagnosis Orders   1. Hypothyroidism, unspecified type  Levothyroxine Sodium (TIROSINT) 13 MCG CAPS    TSH without Reflex    T4, Free   2. Multinodular goiter     3. Hypoglycemia         Shannan Soares MD  Endocrinologist, [place] [address] [facility] Endocrinology   [address] [place] [address]   Phone: [phone]  Fax: [phone]  ------------------------------  An electronic signature was used to authenticate this note.  Jennifer Boston MD on 10/7/2021 at 10:35 AM

## 2021-10-22 LAB
T4 FREE: 1.2
T4 TOTAL: 7.1
TSH SERPL DL<=0.05 MIU/L-ACNC: 15.3 UIU/ML

## 2021-10-26 ENCOUNTER — OFFICE VISIT (OUTPATIENT)
Dept: NON INVASIVE DIAGNOSTICS | Age: 51
End: 2021-10-26

## 2021-10-26 VITALS
RESPIRATION RATE: 16 BRPM | DIASTOLIC BLOOD PRESSURE: 84 MMHG | HEART RATE: 61 BPM | WEIGHT: 261.8 LBS | SYSTOLIC BLOOD PRESSURE: 128 MMHG | OXYGEN SATURATION: 98 % | BODY MASS INDEX: 46.39 KG/M2 | HEIGHT: 63 IN

## 2021-10-26 DIAGNOSIS — F41.9 ANXIETY: ICD-10-CM

## 2021-10-26 DIAGNOSIS — R06.09 DYSPNEA ON EXERTION: ICD-10-CM

## 2021-10-26 DIAGNOSIS — E66.01 MORBID OBESITY (HCC): ICD-10-CM

## 2021-10-26 DIAGNOSIS — Z86.39 H/O THYROID NODULE: ICD-10-CM

## 2021-10-26 DIAGNOSIS — Z98.84 HX OF GASTRIC BYPASS: ICD-10-CM

## 2021-10-26 DIAGNOSIS — R00.2 PALPITATIONS: Primary | ICD-10-CM

## 2021-10-26 DIAGNOSIS — I48.0 PAF (PAROXYSMAL ATRIAL FIBRILLATION) (HCC): ICD-10-CM

## 2021-10-26 DIAGNOSIS — E03.8 OTHER SPECIFIED HYPOTHYROIDISM: ICD-10-CM

## 2021-10-26 PROCEDURE — 99215 OFFICE O/P EST HI 40 MIN: CPT | Performed by: INTERNAL MEDICINE

## 2021-10-26 PROCEDURE — 93000 ELECTROCARDIOGRAM COMPLETE: CPT | Performed by: INTERNAL MEDICINE

## 2021-10-26 ASSESSMENT — ENCOUNTER SYMPTOMS
SHORTNESS OF BREATH: 0
ABDOMINAL PAIN: 0
COUGH: 0
DIARRHEA: 0
COLOR CHANGE: 0
ABDOMINAL DISTENTION: 0
WHEEZING: 0
CHEST TIGHTNESS: 0
NAUSEA: 0
SINUS PRESSURE: 0
EYE REDNESS: 0

## 2021-10-26 NOTE — PROGRESS NOTES
Cardiac Electrophysiology Outpatient Progress Note    Nati Escobedo  1970  Date of Service: 10/26/2021  Referring Provider/PCP: Evelyn Chambers DO  Chief Complaint: AF managment    HISTORY OF PRESENT ILLNESS    Nati Escobedo presents to the office today for follow up of these Electrophysiology conditions: AF managment. 6/7/2021: Nati Escobedo is a 46 y.o. female with a history of HTN, morbid obesity sp gastric bypass, and hypothyroidism. Her home medications include metoprolol 25 mg PRN and prilosec. She presented on 5/31/21 with chief complaint of palpitations that awoke her from sleep. She was diagnosed with atrial fibrillation, which spontaneously terminated a few hours after onset in the ED. She reports a similar symptoms ~10 days previously. She reports weight gain, snoring, and apneic episodes. She denies any other complaints at this time. 6/9/2021: She presents today and reports having ongoing palpitations prior to being diagnosed with AF in May 2021. Her palpitations having increased in occurrence over the past year, in which occur when her thyroid is elevated and her episode lasts a few seconds to minutes. She currently follows with endocrinology and is having her thyroid medication adjusted. She attributes the being of symptoms occurring when receiving IV iron infusion. 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 She presents today in NSR. She reports not taking the Lopressor due to bradycardia at rest. The patient denies any chest pain, dyspnea, palpitations, dizziness, syncope, orthopnea or paroxysmal nocturnal dyspnea. She is very anxious today and was mostly reassured. Her palpitations are mostly every few weeks. 10/26/21: TTE 7/2021 showed normal LVEF with trace MR, TR, Mild LAE. 2 week zio showed 1 NSVT 4 beats, 13 SVTs longest 12 secs. She has not had the tilt table test as of yet due to insurance issues.  She does report having episodes of \"severe\" palpitations. She uses her smart watch to monitor her HR. She did not try the Lopressor, because her smart watch says her HR is 49 bpm. She reports she stopped the Eliquis on her own. She presents today in SR. The patient denies any chest pain, dyspnea, palpitations, dizziness, syncope, orthopnea or paroxysmal nocturnal dyspnea. She is looking into reversing her gastric bypass. Patient Active Problem List    Diagnosis Date Noted    Multinodular goiter 09/10/2021    Pulsatile tinnitus of right ear 07/07/2021    Paresthesias 07/05/2021    Electrocardiogram abnormal 06/09/2021    Palpitations 06/06/2021    Paroxysmal atrial fibrillation ([address] 06/02/2021    Hypothyroidism 10/02/2020    Pituitary disorder ([address] 10/02/2020    H/O thyroid nodule 10/02/2020    Bloating symptom 11/27/2017    Compound heterozygous MTHFR mutation [id number] 11/27/2017    Fibromyalgia 11/27/2017    Hx of gastric bypass 11/27/2017    Pancreatic insufficiency 11/27/2017    B-complex deficiency 11/27/2017    Vitamin D deficiency 11/27/2017    Yeast in stool 11/27/2017    Anxiety 08/21/2017    Perimenopause 08/21/2017    Dyspnea on exertion 03/13/2017    Abscess 05/08/2014    Allergy to drug 05/08/2014    Autoimmune disease ([facility] [address] 05/08/2014    Cellulitis 70/54/9605    Complication of anesthesia 05/08/2014    Hypocalcemia 05/08/2014    Ventral hernia 04/16/2013    Pemphigoid 01/01/2012    Anemia, iron deficiency 05/01/2009    Other and unspecified coagulation defects 08/30/2006    Other and unspecified postsurgical nonabsorption 08/11/2006    Essential hypertension, benign 11/18/2004    Morbid obesity ([address] 11/18/2004    Other and unspecified hyperlipidemia 11/18/2004       History reviewed. No pertinent family history.     SOCIAL HISTORY   Social History     Socioeconomic History    Marital status:      Spouse name: Not on file    Number of children: Not on file    Years of education: Not on file    Highest education level: Not on file   Occupational History    Not on file   Tobacco Use    Smoking status: Never Smoker    Smokeless tobacco: Never Used   Vaping Use    Vaping Use: Never used   Substance and Sexual Activity    Alcohol use: No    Drug use: No    Sexual activity: Not on file   Other Topics Concern    Not on file   Social History Narrative    Not on file     Social Determinants of Health     Financial Resource Strain:     Difficulty of Paying Living Expenses:    Food Insecurity:     Worried About Running Out of Food in the Last Year:     Ran Out of Food in the Last Year:    Transportation Needs:     Lack of Transportation (Medical):      Lack of Transportation (Non-Medical):    Physical Activity:     Days of Exercise per Week:     Minutes of Exercise per Session:    Stress:     Feeling of Stress :    Social Connections:     Frequency of Communication with Friends and Family:     Frequency of Social Gatherings with Friends and Family:     Attends Anabaptist Services:     Active Member of Clubs or Organizations:     Attends Club or Organization Meetings:     Marital Status:    Intimate Partner Violence:     Fear of Current or Ex-Partner:     Emotionally Abused:     Physically Abused:     Sexually Abused:          Past Surgical History:   Procedure Laterality Date    [address] [facility]      x2    CHOLECYSTECTOMY      COLONOSCOPY N/A 1/29/2020    COLORECTAL CANCER SCREENING, HIGH RISK performed by Gisela Manriquez MD at [place] [address] N/A 1/29/2020    EGD BIOPSY performed by Gisela Manriquez MD at [facility] ENDOSCOPY       Current Outpatient Medications   Medication Sig Dispense Refill    Levothyroxine Sodium (TIROSINT) 13 MCG CAPS Take 13 mcg by mouth Daily Take 1 capsule every Sunday along with 100 mcg of Tirosint (Patient taking differently: Take 13 mcg by mouth Daily 13 mcg once a week) 4 capsule 11    TIROSINT 100 MCG CAPS Take 1 capsule daily 30 capsule 5    vitamin D3 (CHOLECALCIFEROL) 25 MCG (1000 UT) TABS tablet Take 1 tablet by mouth daily 90 tablet 1    omeprazole (PRILOSEC) 20 MG delayed release capsule Take 20 mg by mouth daily Patient states not delayed release      LORazepam (ATIVAN) 0.5 MG tablet lorazepam 0.5 mg tablet      metoprolol tartrate (LOPRESSOR) 25 MG tablet Take 0.5 tablets by mouth every evening (Patient not taking: Reported on 10/26/2021) 60 tablet 3     No current facility-administered medications for this visit. Allergies   Allergen Reactions    Avelox [Moxifloxacin Hcl In Nacl]     Cephalexin     Ciprofloxacin     Clindamycin     Coffee Bean Extract [Coffea Arabica]     Magnesium Sulfate     Milk-Related Compounds     Penicillins     Percocet [Oxycodone-Acetaminophen]     Sulfa Antibiotics            ROS:   Review of Systems   Constitutional: Negative for fatigue and fever. HENT: Negative for congestion, nosebleeds and sinus pressure. Eyes: Negative for redness and visual disturbance. Respiratory: Negative for cough, chest tightness, shortness of breath and wheezing. Cardiovascular: Negative for chest pain, palpitations and leg swelling. Gastrointestinal: Negative for abdominal distention, abdominal pain, diarrhea and nausea. Endocrine: Negative for cold intolerance, heat intolerance, polydipsia and polyphagia. Genitourinary: Negative for difficulty urinating, frequency and urgency. Musculoskeletal: Negative for arthralgias, joint swelling and myalgias. Skin: Negative for color change and wound. Neurological: Negative for dizziness, syncope, weakness and numbness. Psychiatric/Behavioral: Negative for agitation, behavioral problems, confusion, decreased concentration, hallucinations and suicidal ideas. The patient is not nervous/anxious.             PHYSICAL EXAM:  Vitals:    10/26/21 1157   BP: 128/84   Site: Left Upper Arm   Position: Sitting   Cuff Size: Large Adult   Pulse: 61   Resp: 16   SpO2: 98%   Weight: 261 lb 12.8 oz (118.8 kg)   Height: 5' 3\" (1.6 m)     Constitutional: Oriented to person, place, and time. Obese and cooperative. Head: Normocephalic and atraumatic. Eyes: Conjunctivae are normal.  Neck: No  JVD present. Cardiovascular: S1 normal, S2 normal  A regular rhythm present. Pulmonary/Chest: Effort normal and breath sounds normal. No respiratory distress. Abdominal: Soft. Normal appearance   Musculoskeletal: Normal range of motion of all extremities, no muscle weakness. Neurological: Alert and oriented to person, place, and time. Skin: Skin is warm and dry. No bruising, no ecchymosis and no rash noted. Extremity: No clubbing or cyanosis. No edema. Psychiatric: Normal mood and affect.  Thought content normal.         Pertinent Labs:    CBC:   WBC (E9/L)   Date Value   07/06/2021 4.4 (L)   07/05/2021 7.1   06/07/2021 5.0     Hemoglobin (g/dL)   Date Value   07/06/2021 13.5   07/05/2021 14.1   06/07/2021 13.0     Hematocrit (%)   Date Value   07/06/2021 42.1   07/05/2021 43.5   06/07/2021 40.8     Platelets (F8/C)   Date Value   07/06/2021 275   07/05/2021 291   06/07/2021 243      BMP:   Sodium (mmol/L)   Date Value   07/27/2021 138   07/06/2021 139   07/05/2021 141     Potassium (mmol/L)   Date Value   07/27/2021 4.6     Potassium reflex Magnesium (mmol/L)   Date Value   07/06/2021 4.1   07/05/2021 4.2   06/07/2021 4.2     Magnesium (mg/dL)   Date Value   06/06/2021 2.1   05/31/2021 2.2   05/28/2021 2.1     Chloride (mmol/L)   Date Value   07/27/2021 102   07/06/2021 100   07/05/2021 101     CO2 (mmol/L)   Date Value   07/06/2021 28   07/05/2021 29   06/07/2021 28     BUN (mg/dL)   Date Value   07/06/2021 9   07/05/2021 9   06/07/2021 12     CREATININE   Date Value   07/27/2021 0.66   07/06/2021 0.7 mg/dL   07/05/2021 0.7 mg/dL Glucose (mg/dL)   Date Value   07/06/2021 90   07/05/2021 104 (H)   06/07/2021 86   12/08/2010 88     Calcium (mg/dL)   Date Value   07/27/2021 9.3   07/06/2021 9.2   07/05/2021 9.4      INR:   INR (no units)   Date Value   06/06/2021 1.1   05/31/2021 1.0      BNP: No results found for: BNP   TSH:   TSH (uIU/mL)   Date Value   09/27/2021 22.300   08/05/2021 18.600   06/06/2021 6.840 (H)      Cardiac Injury Profile: Total CK (U/L)   Date Value   05/28/2021 65     Troponin (ng/mL)   Date Value   01/03/2020 <0.01     Lipid Profile:   Triglycerides (mg/dL)   Date Value   07/06/2021 95     HDL (mg/dL)   Date Value   07/06/2021 77     LDL Calculated (mg/dL)   Date Value   07/06/2021 122 (H)     Cholesterol, Total (mg/dL)   Date Value   07/06/2021 218 (H)      Hemoglobin A1C:   Hemoglobin A1C (%)   Date Value   07/06/2021 4.8       Pertinent Cardiac Testing:     Stress: 2020  1. No reversible perfusion defect   2. Ejection fraction is greater than 70  %.   3. No significant wall motion abnormality     ECHO 2021:    Summary   Normal left ventricular chamber size. Normal left ventricular systolic function, LVEF is 65%. Mild left ventricular concentric hypertrophy noted, 1.2cm   Normal LV diastolic function. Left atrium is mildly enlarged (4cm x 6cm). Mildly increased left atrial volume, 78cc. Interatrial septum not well visualized but appears intact. Normal right ventricle size and function. There is trace mitral regurgitation. No mitral valve prolapse. There is mild aortic regurgitation. There is trace tricuspid regurgitation, RVSP 24mmHg. Aortic root 3.4cm. No evidence of pericardial effusion. No intra cardiac mass or thrombus. No comparison study available. MCOT 2021:   Patient had a min HR of 40 bpm, max HR of 167 bpm, and avg HR of 65  bpm. Predominant underlying rhythm was Sinus Rhythm.  1 run of  Ventricular Tachycardia occurred lasting 6 beats with a max rate of  130 bpm (avg CPAP in treating sleep apnea, smoking cessation and limited ETOH intake. 2. Obesity  - Body mass index is 46.38 kg/m². - encourged weigh loss  - s/p gastric bypass surgery 2017  - she is considering a gastric bypass reversal     3. CLINTON  - She reports snoring and apneic episodes. -  sleep study completed. CLINTON mild per patient. 4. HTN  - BP goal < 130/80 mmHg. - Management per PCP    5. Hypothyroidism  -Management per Endocrinology   Lab Results   Component Value Date    TSH 22.300 09/27/2021      6. COVID 19  - in October 2020    7. Anxiety  - managed by PCP  - on Ativan    8. CLINTON  - patient reports \"mild\"  - encouraged compliance with CPAP    Plan  1. Aggressive hydration. 2. Limit caffeine and ETOH intake. 3. Encouraged trying the  Lopressor daily or PRN. 4. Urged completion of tilt table testing at the [facility]. 5. Lifestyle modifications as outline above. 6. Follow up in 6 months or sooner PRN. Encouraged the patient to call the office for any questions or concerns. Thank you for allowing me to participate in your patient's care. I have spent a total of 40 minutes with the patient and his/her family reviewing the above stated recommendations. A total of >50% of that time involved face-to-face time providing counseling and or coordination of care with the other providers.     Lala Farah MD  Cardiac Electrophysiology  [address] [place] [address]

## 2021-11-04 ENCOUNTER — TELEPHONE (OUTPATIENT)
Dept: ENDOCRINOLOGY | Age: 51
End: 2021-11-04

## 2021-11-04 DIAGNOSIS — E16.1 REACTIVE HYPOGLYCEMIA: ICD-10-CM

## 2021-11-04 DIAGNOSIS — Z98.84 HX OF GASTRIC BYPASS: Primary | ICD-10-CM

## 2021-11-04 DIAGNOSIS — E03.9 HYPOTHYROIDISM, UNSPECIFIED TYPE: Primary | ICD-10-CM

## 2021-11-04 RX ORDER — LANCETS 33 GAUGE
EACH MISCELLANEOUS
Qty: 200 EACH | Refills: 11 | Status: SHIPPED
Start: 2021-11-04 | End: 2022-03-21

## 2021-11-04 RX ORDER — LEVOTHYROXINE SODIUM 112 UG/1
CAPSULE ORAL
Qty: 15 CAPSULE | Refills: 5 | Status: SHIPPED
Start: 2021-11-04 | End: 2021-11-16 | Stop reason: SDUPTHER

## 2021-11-04 RX ORDER — LEVOTHYROXINE SODIUM 100 UG/1
CAPSULE ORAL
Qty: 15 CAPSULE | Refills: 5 | Status: SHIPPED
Start: 2021-11-04 | End: 2021-11-16 | Stop reason: SDUPTHER

## 2021-11-04 RX ORDER — BLOOD SUGAR DIAGNOSTIC
1 STRIP MISCELLANEOUS
Qty: 150 EACH | Refills: 11 | Status: SHIPPED
Start: 2021-11-04 | End: 2022-03-21

## 2021-11-05 NOTE — TELEPHONE ENCOUNTER
Notify pt,  I have reviewed your recent results    Thyroid hormones better than before but are still low     Will change Tirosin from 100 mcg 6 days a week and 113 on  Sunday to alternating between 100 and 112 mcg     Recheck lab in 6-8 weeks

## 2021-11-15 NOTE — TELEPHONE ENCOUNTER
Called and spoke to patient , She received script for 100 mcg then 112 mcg alternating daily from the pharmacy. Patient wanted to know if we can do a 2 month (30/30) supply so that breaking the box up costs more than buying 30 pills vs 15 pills.

## 2021-11-16 DIAGNOSIS — E03.9 HYPOTHYROIDISM, UNSPECIFIED TYPE: ICD-10-CM

## 2021-11-16 RX ORDER — LEVOTHYROXINE SODIUM 112 UG/1
CAPSULE ORAL
Qty: 45 CAPSULE | Refills: 3 | Status: SHIPPED
Start: 2021-11-16 | End: 2021-12-07 | Stop reason: SDUPTHER

## 2021-11-16 RX ORDER — LEVOTHYROXINE SODIUM 100 UG/1
CAPSULE ORAL
Qty: 45 CAPSULE | Refills: 3 | Status: SHIPPED
Start: 2021-11-16 | End: 2021-12-07 | Stop reason: SDUPTHER

## 2021-12-07 DIAGNOSIS — E03.9 HYPOTHYROIDISM, UNSPECIFIED TYPE: ICD-10-CM

## 2021-12-07 RX ORDER — LEVOTHYROXINE SODIUM 112 UG/1
CAPSULE ORAL
Qty: 30 CAPSULE | Refills: 5 | Status: SHIPPED
Start: 2021-12-07 | End: 2021-12-21 | Stop reason: SDUPTHER

## 2021-12-07 RX ORDER — LEVOTHYROXINE SODIUM 100 UG/1
CAPSULE ORAL
Qty: 30 CAPSULE | Refills: 5 | Status: SHIPPED
Start: 2021-12-07 | End: 2021-12-21 | Stop reason: SDUPTHER

## 2021-12-09 ENCOUNTER — TELEPHONE (OUTPATIENT)
Dept: ADMINISTRATIVE | Age: 51
End: 2021-12-09

## 2021-12-09 NOTE — TELEPHONE ENCOUNTER
I have tried to call patient multiple times with no answer. I left 2 voicemails so far informing pt that we sent her medication to her pharmacy on 12/7.

## 2021-12-09 NOTE — TELEPHONE ENCOUNTER
Patient said she has left 4 messages and her pharmacy has tried to reach booal. She is out of her meds. Please call patient. Thank you!

## 2021-12-13 LAB
T4 FREE: 1.1
TSH SERPL DL<=0.05 MIU/L-ACNC: 16.6 UIU/ML

## 2021-12-17 ENCOUNTER — TELEPHONE (OUTPATIENT)
Dept: ENDOCRINOLOGY | Age: 51
End: 2021-12-17

## 2021-12-17 DIAGNOSIS — E03.9 HYPOTHYROIDISM, UNSPECIFIED TYPE: ICD-10-CM

## 2021-12-17 NOTE — TELEPHONE ENCOUNTER
Please review labs from 12/13. Pt is concerned that TSH is still 16. Pt states she did miss 2 pills about 4 weeks ago but otherwise has been taking them consistently.

## 2021-12-20 NOTE — TELEPHONE ENCOUNTER
Her level is low, will tiny adjust her regimen from alternating Tirosin 100/112 mcg to 112 mcg 5 days a week and 100 on Saturday and Sunday

## 2021-12-21 RX ORDER — LEVOTHYROXINE SODIUM 112 UG/1
CAPSULE ORAL
Qty: 30 CAPSULE | Refills: 5 | Status: SHIPPED
Start: 2021-12-21 | End: 2022-02-08 | Stop reason: SDUPTHER

## 2021-12-21 RX ORDER — LEVOTHYROXINE SODIUM 100 UG/1
CAPSULE ORAL
Qty: 30 CAPSULE | Refills: 1 | Status: SHIPPED
Start: 2021-12-21 | End: 2022-02-08 | Stop reason: SDUPTHER

## 2022-01-18 ENCOUNTER — TELEPHONE (OUTPATIENT)
Dept: ENDOCRINOLOGY | Age: 52
End: 2022-01-18

## 2022-01-18 ENCOUNTER — TELEPHONE (OUTPATIENT)
Dept: NON INVASIVE DIAGNOSTICS | Age: 52
End: 2022-01-18

## 2022-01-18 DIAGNOSIS — E03.9 HYPOTHYROIDISM, UNSPECIFIED TYPE: Primary | ICD-10-CM

## 2022-01-18 NOTE — TELEPHONE ENCOUNTER
Patient called the office stating she has been using her smart watch to monitor her HR. She states her HR can go up to 123bpm and as low as 38bpm. She reports being asymptomatic with these readings. She goes on to state that her bradycardia occurred when she was sleeping. She is not using the CPAP, has not been using the Lopressor and is trying get her thyroid under control. She wore a monitor in July 2021. Please advise.

## 2022-01-18 NOTE — TELEPHONE ENCOUNTER
We can check thyroid levels. TSH and free T4 ordered. Will check CMP and mag. Labs ordered. I do not feel Nicolasashawn Darden is causing bradycardia. Also 150 mcg of iodine should not affect her thyroid levels. .  She should follow-up with PCP regarding bradycardia. Patient also has history of A. fib. She should also make an appointment with electrophysiologist or cardiologist to discuss.   If continues then she develops chest pain or shortness of breath she needs to go to the emergency department for evaluation

## 2022-01-18 NOTE — TELEPHONE ENCOUNTER
Pt noticed that her heart rate is declining after she takes her tirosint. Pt states when she doesn't take her medication she feels good and her heart rate is normal. Pt's heart rate is dropping as low as 39. At times she feels dizzy and lightheaded. Pt states she is cold and really tired. She thinks that her thyroid levels are off. Please advise if she should get labs. Pt was also prescribed a vitamin dekas bariatric and it has 150mcg iodine in it and would like to know if iodine is safe to take with thyroid issues. Pt would also like her sodium, magnesium and potassium checked.

## 2022-01-19 NOTE — TELEPHONE ENCOUNTER
Spoke to Dr. Sami Newberry and she would like to have the patient wear a 48 hour HM monitor. Patient notified of recommendations as per Dr. Sami Newberry. The patient verbalized understanding.  She is scheduled for a monitor hookup on 1/21/2022 at 1:40pm.

## 2022-01-19 NOTE — TELEPHONE ENCOUNTER
I called and left voice mail, I read off the message given by Rika Streeter answering all her questions. Also letting her know to call the office concerning any endo questions she may have. But if she had any chest pains or SOB to go to the ER. And to please follow up with her elecrophysiologist or cardiologist for the possible bradycardia/A-fib like feelings she is having.

## 2022-01-20 ENCOUNTER — NURSE ONLY (OUTPATIENT)
Dept: NON INVASIVE DIAGNOSTICS | Age: 52
End: 2022-01-20

## 2022-01-20 DIAGNOSIS — R00.1 BRADYCARDIA: Primary | ICD-10-CM

## 2022-01-20 NOTE — PROGRESS NOTES
Patient was seen in Office today to have 48 hour holter monitor put on per Dr. Nestor Yousif. Pt tolerated placement and understood use and return of device number [id number].     Electronically signed by John Millard MA on 1/20/2022 at 2:25 PM

## 2022-02-02 DIAGNOSIS — R00.1 BRADYCARDIA: ICD-10-CM

## 2022-02-07 ENCOUNTER — TELEPHONE (OUTPATIENT)
Dept: NON INVASIVE DIAGNOSTICS | Age: 52
End: 2022-02-07

## 2022-02-07 NOTE — TELEPHONE ENCOUNTER
----- Message from Maria R Silveira [id number] sent at 2/3/2022  1:55 PM EST -----    ----- Message -----  From: Suzi Robertson MD  Sent: 2/3/2022   1:40 PM EST  To: Aydee Espinosa Pls let her know that monitor did not show any significant finding

## 2022-02-08 ENCOUNTER — VIRTUAL VISIT (OUTPATIENT)
Dept: ENDOCRINOLOGY | Age: 52
End: 2022-02-08
Payer: COMMERCIAL

## 2022-02-08 DIAGNOSIS — E04.2 MULTINODULAR GOITER: Primary | ICD-10-CM

## 2022-02-08 DIAGNOSIS — Z98.84 HX OF GASTRIC BYPASS: ICD-10-CM

## 2022-02-08 DIAGNOSIS — E03.9 HYPOTHYROIDISM, UNSPECIFIED TYPE: ICD-10-CM

## 2022-02-08 PROCEDURE — 99214 OFFICE O/P EST MOD 30 MIN: CPT | Performed by: INTERNAL MEDICINE

## 2022-02-08 RX ORDER — LEVOTHYROXINE SODIUM 100 UG/1
CAPSULE ORAL
Qty: 30 CAPSULE | Refills: 5 | Status: SHIPPED
Start: 2022-02-08 | End: 2022-03-03

## 2022-02-08 RX ORDER — LEVOTHYROXINE SODIUM 112 UG/1
CAPSULE ORAL
Qty: 30 CAPSULE | Refills: 5 | Status: SHIPPED
Start: 2022-02-08 | End: 2022-03-03 | Stop reason: SDUPTHER

## 2022-02-08 NOTE — PROGRESS NOTES
Brea Fox was read the following message We want to confirm that, for purposes of billing, this is a virtual visit with your provider for which we will submit a claim for reimbursement with your insurance company. You will be responsible for any copays, coinsurance amounts or other amounts not covered by your insurance company. If you do not accept this, unfortunately we will not be able to schedule or proceed with a virtual visit with the provider. Do you accept? Martha responded Yes .

## 2022-02-08 NOTE — PROGRESS NOTES
[address] [place] [address] Department of Endocrinology Diabetes and Metabolism   [address] [facility] [address]   Phone: [phone]  Fax: [phone]    Date of Service: 2/8/2022  Primary Care Physician: Lolis Patel DO  Provider: Viviane Mary MD        Reason for the visit:  Primary Hypothyroidism    History of Present Illness: The history is provided by the patient. No  was used. Accuracy of the patient data is excellent. Elayne Gaffney is a very pleasant 46 y.o. female seen today for follow up visit   The patent was diagnosed with hypothyroidism in her early 19's. She is currently on Tirosint 112 mcg 5 days a week and 100 mcg 2 days a wk. Patient takes Tirosint in the morning at empty stomach, wait one hour before eating , avoid multivitamins containing calcium  or iron with it. Pt has h/o gastric bypass surgery 16 years ago and since then she has been struggling to maintain normal  thyroid level   TSH has been running high with mid normal FT4   1/21/2022 - TSH 4.2, FT4 1.3  Lab Results   Component Value Date/Time    TSH 16.600 12/13/2021 12:00 AM    T4FREE 1.1 12/13/2021 12:00 AM    U1BEDVK 7.1 10/22/2021 12:00 AM    T2QCXZB 82.29 06/06/2021 02:02 AM    TSI <0.10 10/03/2020 08:12 AM    TPOABS 2.6 10/03/2020 08:12 AM    THGAB <0.9 10/03/2020 08:12 AM       MNG   Thyroid US 10/2019   Atrophic heterogenous  thyroid gland   Rt lobe measures 2.6 x 0.7 x 0.7 cm, Lt lobe measures 2.1 x \0.8 x 0.5 cm, isthmus measures 0.1 cm   Non specific calcification in interpolar region measures 0.2 cm      Thyroid US 12/2020 --> tiny 2 and 2 mm thyroid nodules     Martha García reported some discomfort at thyroid level but denies any voice change, or shortness of breath. No family history of thyroid cancer. No prior history of radiation to head or neck region.       PAST MEDICAL HISTORY   Past Medical History:   Diagnosis Date    Atrial fibrillation ([address]     Obesity     Prolonged emergence from general anesthesia     SENSITIVE TO MEDS, TAKES LESS TO PUT HER UNDER, DIFFICULTY WAKING, STOPS BREATHING PER PATIENT    Thyroid disease        PAST SURGICAL HISTORY   Past Surgical History:   Procedure Laterality Date    ABDOMEN SURGERY       SECTION      x2    CHOLECYSTECTOMY      COLONOSCOPY N/A 2020    COLORECTAL CANCER SCREENING, HIGH RISK performed [facility] at [facility]      TUBAL LIGATION      UPPER GASTROINTESTINAL ENDOSCOPY N/A 2020    EGD BIOPSY performed by Destinee Ludwig MD at [address]   Tobacco:   reports that she has never smoked. She has never used smokeless tobacco.  Alcohol:   reports no history of alcohol use. Drugs:   reports no history of drug use. FAMILY HISTORY   No family history on file. ALLERGIES AND DRUG REACTIONS   Allergies   Allergen Reactions    Avelox [Moxifloxacin Hcl In Nacl]     Cephalexin     Ciprofloxacin     Clindamycin     Coffee Bean Extract [Coffea Arabica]     Magnesium Sulfate     Milk-Related Compounds     Penicillins     Percocet [Oxycodone-Acetaminophen]     Sulfa Antibiotics        CURRENT MEDICATIONS   Current Outpatient Medications   Medication Sig Dispense Refill    TIROSINT 112 MCG CAPS Take Monday through Friday 30 capsule 5    TIROSINT 100 MCG CAPS Take Saturday and  30 capsule 5    Lancets (ONETOUCH DELICA PLUS [id number]) MISC To test 5x/day 200 each 11    blood glucose test strips (ONETOUCH VERIO) strip 1 each by In Vitro route 5 times daily As needed.  150 each 11    vitamin D3 (CHOLECALCIFEROL) 25 MCG (1000 UT) TABS tablet Take 1 tablet by mouth daily 90 tablet 1    metoprolol tartrate (LOPRESSOR) 25 MG tablet Take 0.5 tablets by mouth every evening (Patient not taking: Reported on 10/26/2021) 60 tablet 3    omeprazole (PRILOSEC) 20 MG delayed release capsule Take 20 mg by mouth daily Patient states not delayed release      LORazepam (ATIVAN) 0.5 MG tablet lorazepam 0.5 mg tablet       No current facility-administered medications for this visit. Review of Systems  Constitutional: No fever, no chills, no diaphoresis, no generalized weakness. HEENT: No blurred vision, No sore throat, no ear pain, no hair loss  Neck: denied any neck swelling, difficulty swallowing,   Cadrdiopulomary: No CP, SOB or palpitation, No orthopnea or PND. No cough or wheezing. GI: No N/V/D, no constipation, No abdominal pain, no melena or hematochezia   : Denied any dysuria, hematuria, flank pain, discharge, or incontinence. Skin: denied any rash, ulcer, Hirsute, or hyperpigmentation. MSK: denied any joint deformity, joint pain/swelling, muscle pain, or back pain. Neuro: no numbess, no tingling, no weakness,     OBJECTIVE    There were no vitals taken for this visit. BP Readings from Last 4 Encounters:   10/26/21 128/84   10/07/21 126/85   07/08/21 128/80   06/09/21 120/78     Wt Readings from Last 6 Encounters:   10/26/21 261 lb 12.8 oz (118.8 kg)   10/07/21 259 lb (117.5 kg)   07/05/21 250 lb (113.4 kg)   06/09/21 250 lb 3.2 oz (113.5 kg)   06/06/21 250 lb (113.4 kg)   06/02/21 256 lb 12.8 oz (116.5 kg)       Physical examination:  General: awake alert, oriented x3, no abnormal position or movements. HEENT: normocephalic non traumatic  Neck: supple, no LN enlargement, no thyroid tenderness, no JVD. Pulm: Clear equal air entry no added sounds, no wheezing or rhonchi    CVS: S1 + S2, no murmur, no heave. Dorsalis pedis pulse palpable   Abd: soft lax, no tenderness, no organomegaly, audible bowel sounds. Skin: warm, no lesions, no rash.   Musculoskeletal: No back tenderness, no kyphosis/scoliosis   Neuro: CN intact, sensation normal , muscle power normal.  Psych: normal mood, and affect    Review of Laboratory Data:  I have reviewed the following:  Lab Results   Component Value Date/Time WBC 4.4 (L) 07/06/2021 05:59 AM    RBC 4.40 07/06/2021 05:59 AM    HGB 13.5 07/06/2021 05:59 AM    HCT 42.1 07/06/2021 05:59 AM    MCV 95.7 07/06/2021 05:59 AM    MCH 30.7 07/06/2021 05:59 AM    MCHC 32.1 07/06/2021 05:59 AM    RDW 14.4 07/06/2021 05:59 AM     07/06/2021 05:59 AM    MPV 10.6 07/06/2021 05:59 AM      Lab Results   Component Value Date/Time     07/27/2021 12:00 AM    K 4.6 07/27/2021 12:00 AM    K 4.1 07/06/2021 05:59 AM    CO2 28 07/06/2021 05:59 AM    BUN 9 07/06/2021 05:59 AM    CREATININE 0.66 07/27/2021 12:00 AM    CALCIUM 9.3 07/27/2021 12:00 AM    LABGLOM 60 07/27/2021 12:00 AM    LABGLOM >60 07/06/2021 05:59 AM    GFRAA >60 07/06/2021 05:59 AM      Lab Results   Component Value Date/Time    TSH 16.600 12/13/2021 12:00 AM    T4FREE 1.1 12/13/2021 12:00 AM    H8FEIML 7.1 10/22/2021 12:00 AM    T4NZMZQ 82.29 06/06/2021 02:02 AM    TSI <0.10 10/03/2020 08:12 AM    TPOABS 2.6 10/03/2020 08:12 AM    THGAB <0.9 10/03/2020 08:12 AM     Lab Results   Component Value Date    LABA1C 4.8 07/06/2021    GLUCOSE 90 07/06/2021    GLUCOSE 88 12/08/2010     Lab Results   Component Value Date    TRIG 95 07/06/2021    HDL 77 07/06/2021    LDLCALC 122 07/06/2021    CHOL 218 07/06/2021     Lab Results   Component Value Date    VITD25 21 07/06/2021    VITD25 32 05/28/2021     ASSESSMENT & RECOMMENDATIONS   Martha Nicklas Rain, a 46 y.o.-old female seen in for following issues     Primary hypothyroidism   · I had a long discussion with pt and encouraged he take Tirosin every morning at empty stomach, wait one hour before eating , avoid multivitamins containing calcium  or iron with it. · Continue Tirosint 112 mcg 5 days a week and 100 mcg 2 days a wk.   · TFT in few months   · The had gastric bypass surgery about 16 years ago and since then she has been struggling to maintain normal  thyroid level     VitD deficiency  · Continue vitD supplement      MNG   · I have reviewed previous thyroid US images myself (10/2019, 12/2020). Thyroid gland is very heterogenous and atrophic without any discrete nodules. Finding very consistent with chronic thyroiditis. Non specific tiny calcification seen in interpolar region measures 0.2 cm and nodule measuring 3 mm    · Continue monitoring with periodic US     I personally reviewed external notes from PCP and other patient's care team providers, and personally interpreted labs associated with the above diagnosis. I also ordered labs to further assess and manage the above addressed medical conditions    Return in about 3 months (around 5/8/2022) for hypothyroidism, VitD deficiency. The above issues were reviewed with the patient who understood and agreed with the plan. Thank you for allowing us to participate in the care of this patient. Please do not hesitate to contact us with any additional questions. Diagnosis Orders   1. Multinodular goiter     2. Hypothyroidism, unspecified type  TIROSINT 112 MCG CAPS    TIROSINT 100 MCG CAPS    TSH without Reflex    T4, Free   3. Hx of gastric bypass         Sheri Murphy MD  Endocrinologist, [facility] Diabetes Care and Endocrinology   [address] [facility] [address]   Phone: [phone]  Fax: [phone]  ------------------------------  An electronic signature was used to authenticate this note.  Lemuel Bland MD on 2/8/2022 at 11:28 AM

## 2022-03-03 ENCOUNTER — HOSPITAL ENCOUNTER (OUTPATIENT)
Age: 52
Discharge: HOME | End: 2022-03-03
Payer: COMMERCIAL

## 2022-03-03 ENCOUNTER — TELEPHONE (OUTPATIENT)
Dept: ENDOCRINOLOGY | Age: 52
End: 2022-03-03

## 2022-03-03 DIAGNOSIS — E03.9 HYPOTHYROIDISM, UNSPECIFIED TYPE: ICD-10-CM

## 2022-03-03 LAB
T4 FREE: 1.16 NG/DL (ref 0.93–1.7)
TSH SERPL DL<=0.05 MIU/L-ACNC: 9.64 UIU/ML (ref 0.27–4.2)

## 2022-03-03 PROCEDURE — 36415 COLL VENOUS BLD VENIPUNCTURE: CPT

## 2022-03-03 PROCEDURE — 84443 ASSAY THYROID STIM HORMONE: CPT

## 2022-03-03 PROCEDURE — 84439 ASSAY OF FREE THYROXINE: CPT

## 2022-03-03 RX ORDER — LEVOTHYROXINE SODIUM 112 UG/1
CAPSULE ORAL
Qty: 30 CAPSULE | Refills: 5 | Status: SHIPPED
Start: 2022-03-03 | End: 2022-05-02 | Stop reason: SDUPTHER

## 2022-03-04 NOTE — TELEPHONE ENCOUNTER
Notify pt,  I have reviewed your recent results    Thyroid hormone are much better but still below goal     I recommend slightly adjust Tirosint dose from 112 mcg 5 days a wee and 100 mcg 2 days a wk to 112 mcg daily     Recheck labs in 8 weeks

## 2022-03-04 NOTE — TELEPHONE ENCOUNTER
----- Message from ROSS Gillespie sent at 3/3/2022  3:50 PM EST -----  Recently saw you in office   ----- Message -----  From: Nita Rebolledo Incoming Results From [employer]  Sent: 3/3/2022   3:22 PM EST  To: ROSS Gillespie

## 2022-03-21 ENCOUNTER — HOSPITAL ENCOUNTER (INPATIENT)
Age: 52
LOS: 2 days | Discharge: HOME | DRG: 309 | End: 2022-03-23
Attending: EMERGENCY MEDICINE | Admitting: FAMILY MEDICINE
Payer: COMMERCIAL

## 2022-03-21 ENCOUNTER — APPOINTMENT (OUTPATIENT)
Dept: GENERAL RADIOLOGY | Age: 52
DRG: 309 | End: 2022-03-21
Payer: COMMERCIAL

## 2022-03-21 DIAGNOSIS — I48.91 ATRIAL FIBRILLATION WITH RVR (HCC): Primary | ICD-10-CM

## 2022-03-21 PROBLEM — F41.1 GAD (GENERALIZED ANXIETY DISORDER): Status: ACTIVE | Noted: 2022-03-21

## 2022-03-21 PROBLEM — Z65.8 PSYCHOSOCIAL STRESSORS: Status: ACTIVE | Noted: 2022-03-21

## 2022-03-21 PROBLEM — G47.33 OSA (OBSTRUCTIVE SLEEP APNEA): Status: ACTIVE | Noted: 2022-03-21

## 2022-03-21 LAB
ALBUMIN SERPL-MCNC: 4.2 G/DL (ref 3.5–5.2)
ALP BLD-CCNC: 111 U/L (ref 35–104)
ALT SERPL-CCNC: 10 U/L (ref 0–32)
ANION GAP SERPL CALCULATED.3IONS-SCNC: 13 MMOL/L (ref 7–16)
AST SERPL-CCNC: 19 U/L (ref 0–31)
BASOPHILS ABSOLUTE: 0.05 E9/L (ref 0–0.2)
BASOPHILS RELATIVE PERCENT: 0.8 % (ref 0–2)
BILIRUB SERPL-MCNC: 0.5 MG/DL (ref 0–1.2)
BUN BLDV-MCNC: 11 MG/DL (ref 6–20)
CALCIUM SERPL-MCNC: 9.3 MG/DL (ref 8.6–10.2)
CHLORIDE BLD-SCNC: 100 MMOL/L (ref 98–107)
CO2: 24 MMOL/L (ref 22–29)
CREAT SERPL-MCNC: 0.6 MG/DL (ref 0.5–1)
D DIMER: 693 NG/ML DDU
EKG ATRIAL RATE: 83 BPM
EKG P AXIS: 54 DEGREES
EKG P-R INTERVAL: 178 MS
EKG Q-T INTERVAL: 384 MS
EKG QRS DURATION: 92 MS
EKG QTC CALCULATION (BAZETT): 451 MS
EKG R AXIS: -16 DEGREES
EKG T AXIS: 43 DEGREES
EKG VENTRICULAR RATE: 83 BPM
EOSINOPHILS ABSOLUTE: 0.33 E9/L (ref 0.05–0.5)
EOSINOPHILS RELATIVE PERCENT: 5 % (ref 0–6)
GFR AFRICAN AMERICAN: >60
GFR NON-AFRICAN AMERICAN: >60 ML/MIN/1.73
GLUCOSE BLD-MCNC: 112 MG/DL (ref 74–99)
HCT VFR BLD CALC: 41.8 % (ref 34–48)
HEMOGLOBIN: 13.1 G/DL (ref 11.5–15.5)
IMMATURE GRANULOCYTES #: 0.02 E9/L
IMMATURE GRANULOCYTES %: 0.3 % (ref 0–5)
INR BLD: 1.2
LACTIC ACID, SEPSIS: 1.6 MMOL/L (ref 0.5–1.9)
LYMPHOCYTES ABSOLUTE: 1.67 E9/L (ref 1.5–4)
LYMPHOCYTES RELATIVE PERCENT: 25.2 % (ref 20–42)
MAGNESIUM: 2.2 MG/DL (ref 1.6–2.6)
MCH RBC QN AUTO: 29.6 PG (ref 26–35)
MCHC RBC AUTO-ENTMCNC: 31.3 % (ref 32–34.5)
MCV RBC AUTO: 94.4 FL (ref 80–99.9)
METER GLUCOSE: 101 MG/DL (ref 74–99)
MONOCYTES ABSOLUTE: 0.43 E9/L (ref 0.1–0.95)
MONOCYTES RELATIVE PERCENT: 6.5 % (ref 2–12)
NEUTROPHILS ABSOLUTE: 4.14 E9/L (ref 1.8–7.3)
NEUTROPHILS RELATIVE PERCENT: 62.2 % (ref 43–80)
PDW BLD-RTO: 14.6 FL (ref 11.5–15)
PLATELET # BLD: 266 E9/L (ref 130–450)
PMV BLD AUTO: 10.7 FL (ref 7–12)
POTASSIUM REFLEX MAGNESIUM: 3.8 MMOL/L (ref 3.5–5)
PRO-BNP: 39 PG/ML (ref 0–125)
PROTHROMBIN TIME: 13.5 SEC (ref 9.3–12.4)
RBC # BLD: 4.43 E12/L (ref 3.5–5.5)
SARS-COV-2, NAAT: NOT DETECTED
SODIUM BLD-SCNC: 137 MMOL/L (ref 132–146)
TOTAL PROTEIN: 7.6 G/DL (ref 6.4–8.3)
TROPONIN, HIGH SENSITIVITY: 7 NG/L (ref 0–9)
TROPONIN, HIGH SENSITIVITY: <6 NG/L (ref 0–9)
TSH SERPL DL<=0.05 MIU/L-ACNC: 2.99 UIU/ML (ref 0.27–4.2)
WBC # BLD: 6.6 E9/L (ref 4.5–11.5)

## 2022-03-21 PROCEDURE — 83735 ASSAY OF MAGNESIUM: CPT

## 2022-03-21 PROCEDURE — 94660 CPAP INITIATION&MGMT: CPT

## 2022-03-21 PROCEDURE — 96374 THER/PROPH/DIAG INJ IV PUSH: CPT

## 2022-03-21 PROCEDURE — 83605 ASSAY OF LACTIC ACID: CPT

## 2022-03-21 PROCEDURE — 93005 ELECTROCARDIOGRAM TRACING: CPT | Performed by: EMERGENCY MEDICINE

## 2022-03-21 PROCEDURE — G0378 HOSPITAL OBSERVATION PER HR: HCPCS

## 2022-03-21 PROCEDURE — 2060000000 HC ICU INTERMEDIATE R&B

## 2022-03-21 PROCEDURE — 36415 COLL VENOUS BLD VENIPUNCTURE: CPT

## 2022-03-21 PROCEDURE — 87635 SARS-COV-2 COVID-19 AMP PRB: CPT

## 2022-03-21 PROCEDURE — 2500000003 HC RX 250 WO HCPCS: Performed by: GENERAL PRACTICE

## 2022-03-21 PROCEDURE — 93010 ELECTROCARDIOGRAM REPORT: CPT | Performed by: INTERNAL MEDICINE

## 2022-03-21 PROCEDURE — 80053 COMPREHEN METABOLIC PANEL: CPT

## 2022-03-21 PROCEDURE — 96361 HYDRATE IV INFUSION ADD-ON: CPT

## 2022-03-21 PROCEDURE — 83880 ASSAY OF NATRIURETIC PEPTIDE: CPT

## 2022-03-21 PROCEDURE — 84484 ASSAY OF TROPONIN QUANT: CPT

## 2022-03-21 PROCEDURE — 71045 X-RAY EXAM CHEST 1 VIEW: CPT

## 2022-03-21 PROCEDURE — 82962 GLUCOSE BLOOD TEST: CPT

## 2022-03-21 PROCEDURE — 85610 PROTHROMBIN TIME: CPT

## 2022-03-21 PROCEDURE — 85378 FIBRIN DEGRADE SEMIQUANT: CPT

## 2022-03-21 PROCEDURE — 85025 COMPLETE CBC W/AUTO DIFF WBC: CPT

## 2022-03-21 PROCEDURE — 2580000003 HC RX 258: Performed by: FAMILY MEDICINE

## 2022-03-21 PROCEDURE — 93005 ELECTROCARDIOGRAM TRACING: CPT | Performed by: GENERAL PRACTICE

## 2022-03-21 PROCEDURE — 2500000003 HC RX 250 WO HCPCS: Performed by: EMERGENCY MEDICINE

## 2022-03-21 PROCEDURE — 99285 EMERGENCY DEPT VISIT HI MDM: CPT

## 2022-03-21 PROCEDURE — 2580000003 HC RX 258: Performed by: GENERAL PRACTICE

## 2022-03-21 PROCEDURE — 84443 ASSAY THYROID STIM HORMONE: CPT

## 2022-03-21 PROCEDURE — 6370000000 HC RX 637 (ALT 250 FOR IP): Performed by: FAMILY MEDICINE

## 2022-03-21 RX ORDER — DILTIAZEM HYDROCHLORIDE 5 MG/ML
10 INJECTION INTRAVENOUS ONCE
Status: COMPLETED | OUTPATIENT
Start: 2022-03-21 | End: 2022-03-21

## 2022-03-21 RX ORDER — APIXABAN 5 MG/1
5 TABLET, FILM COATED ORAL 2 TIMES DAILY
COMMUNITY

## 2022-03-21 RX ORDER — ONDANSETRON 2 MG/ML
4 INJECTION INTRAMUSCULAR; INTRAVENOUS EVERY 6 HOURS PRN
Status: DISCONTINUED | OUTPATIENT
Start: 2022-03-21 | End: 2022-03-23 | Stop reason: HOSPADM

## 2022-03-21 RX ORDER — SODIUM CHLORIDE 0.9 % (FLUSH) 0.9 %
5-40 SYRINGE (ML) INJECTION PRN
Status: DISCONTINUED | OUTPATIENT
Start: 2022-03-21 | End: 2022-03-23 | Stop reason: HOSPADM

## 2022-03-21 RX ORDER — MECOBALAMIN 5000 MCG
5 TABLET,DISINTEGRATING ORAL NIGHTLY
Status: DISCONTINUED | OUTPATIENT
Start: 2022-03-21 | End: 2022-03-23 | Stop reason: HOSPADM

## 2022-03-21 RX ORDER — LORAZEPAM 0.5 MG/1
0.5 TABLET ORAL EVERY 6 HOURS PRN
Status: DISCONTINUED | OUTPATIENT
Start: 2022-03-21 | End: 2022-03-23 | Stop reason: HOSPADM

## 2022-03-21 RX ORDER — ASPIRIN 81 MG/1
324 TABLET, CHEWABLE ORAL ONCE
Status: COMPLETED | OUTPATIENT
Start: 2022-03-21 | End: 2022-03-21

## 2022-03-21 RX ORDER — SODIUM CHLORIDE 0.9 % (FLUSH) 0.9 %
5-40 SYRINGE (ML) INJECTION EVERY 12 HOURS SCHEDULED
Status: DISCONTINUED | OUTPATIENT
Start: 2022-03-21 | End: 2022-03-23 | Stop reason: HOSPADM

## 2022-03-21 RX ORDER — 0.9 % SODIUM CHLORIDE 0.9 %
1000 INTRAVENOUS SOLUTION INTRAVENOUS ONCE
Status: COMPLETED | OUTPATIENT
Start: 2022-03-21 | End: 2022-03-21

## 2022-03-21 RX ORDER — PANTOPRAZOLE SODIUM 20 MG/1
20 TABLET, DELAYED RELEASE ORAL
Status: DISCONTINUED | OUTPATIENT
Start: 2022-03-22 | End: 2022-03-23 | Stop reason: HOSPADM

## 2022-03-21 RX ORDER — ONDANSETRON 4 MG/1
4 TABLET, ORALLY DISINTEGRATING ORAL EVERY 8 HOURS PRN
Status: DISCONTINUED | OUTPATIENT
Start: 2022-03-21 | End: 2022-03-23 | Stop reason: HOSPADM

## 2022-03-21 RX ORDER — SODIUM CHLORIDE 9 MG/ML
25 INJECTION, SOLUTION INTRAVENOUS PRN
Status: DISCONTINUED | OUTPATIENT
Start: 2022-03-21 | End: 2022-03-23 | Stop reason: HOSPADM

## 2022-03-21 RX ORDER — POLYETHYLENE GLYCOL 3350 17 G/17G
17 POWDER, FOR SOLUTION ORAL DAILY PRN
Status: DISCONTINUED | OUTPATIENT
Start: 2022-03-21 | End: 2022-03-23 | Stop reason: HOSPADM

## 2022-03-21 RX ORDER — LEVOTHYROXINE SODIUM 112 UG/1
112 TABLET ORAL DAILY
Status: DISCONTINUED | OUTPATIENT
Start: 2022-03-22 | End: 2022-03-23 | Stop reason: HOSPADM

## 2022-03-21 RX ADMIN — ASPIRIN 81 MG 324 MG: 81 TABLET ORAL at 14:03

## 2022-03-21 RX ADMIN — DILTIAZEM HYDROCHLORIDE 10 MG: 5 INJECTION INTRAVENOUS at 10:38

## 2022-03-21 RX ADMIN — DILTIAZEM HYDROCHLORIDE 10 MG: 5 INJECTION INTRAVENOUS at 10:26

## 2022-03-21 RX ADMIN — SODIUM CHLORIDE 1000 ML: 9 INJECTION, SOLUTION INTRAVENOUS at 10:29

## 2022-03-21 RX ADMIN — SODIUM CHLORIDE, PRESERVATIVE FREE 10 ML: 5 INJECTION INTRAVENOUS at 21:23

## 2022-03-21 ASSESSMENT — ENCOUNTER SYMPTOMS
COUGH: 0
WHEEZING: 0
CHEST TIGHTNESS: 0
SORE THROAT: 0
DIARRHEA: 0
ABDOMINAL PAIN: 0
SHORTNESS OF BREATH: 1
VOMITING: 0
NAUSEA: 0
SINUS PAIN: 0
CHOKING: 0
EYE PAIN: 0

## 2022-03-21 ASSESSMENT — PAIN SCALES - GENERAL
PAINLEVEL_OUTOF10: 0

## 2022-03-21 NOTE — H&P
9.64-> 2.99. She had 48-hour Holter in February that showed 2 occurrences of SVT, few PACs/PVCs, no A. fib. Last documented echo was in 2021, EF was 65%. Last documented stress test was in  which was negative with EF greater than 70%. EKG today initially in the ER showed A. fib RVR rate of 139, repeat showed normal sinus rhythm rate of 83. She converted to sinus rhythm after receiving bolus of Cardizem. She is being admitted for further management. Past Medical History:          Diagnosis Date    Atrial fibrillation (HCC)     Obesity     Prolonged emergence from general anesthesia     SENSITIVE TO MEDS, TAKES LESS TO PUT HER UNDER, DIFFICULTY WAKING, STOPS BREATHING PER PATIENT    Thyroid disease        Past Surgical History:          Procedure Laterality Date    ABDOMEN SURGERY       SECTION      x2    CHOLECYSTECTOMY      COLONOSCOPY N/A 2020    COLORECTAL CANCER SCREENING, HIGH RISK performed by René Magallon MD at [address]      UPPER GASTROINTESTINAL ENDOSCOPY N/A 2020    EGD BIOPSY performed by René Magallon MD at [address] [facility]       Medications Prior to Admission:      Prior to Admission medications    Medication Sig Start Date End Date Taking? Authorizing Provider   apixaban (ELIQUIS) 5 MG TABS tablet Take 5 mg by mouth 2 times daily    Yes Historical Provider, MD   TIROSINT 112 MCG CAPS Take 1 capsule daily 3/3/22   Caitie Kimball MD   LORazepam (ATIVAN) 0.5 MG tablet lorazepam 0.5 mg tablet    Historical Provider, MD       Allergies: Avelox [moxifloxacin hcl in nacl], Cephalexin, Ciprofloxacin, Clindamycin, Coffee bean extract [coffea arabica], Magnesium sulfate, Milk-related compounds, Penicillins, Percocet [oxycodone-acetaminophen], and Sulfa antibiotics    Social History:      The patient currently lives at home.   TOBACCO:   reports that she has never smoked. She has never used smokeless tobacco.  ETOH:   reports no history of alcohol use. Family History:     Reviewed in detail Positive as follows: Father had MI and strokes. Mother with heart disease and strokes. REVIEW OF SYSTEMS:   Pertinent positives as noted in the HPI. All other systems reviewed and negative. PHYSICAL EXAM:    /73   Pulse 84   Temp 98 °F (36.7 °C)   Resp 18   Ht 5' 3\" (1.6 m)   Wt 255 lb (115.7 kg)   SpO2 100%   BMI 45.17 kg/m²     General appearance: Middle-aged female, morbidly obese, tearful but not in acute painful or respiratory distress, appears stated age and cooperative. HEENT:  Normal cephalic, atraumatic without obvious deformity. Pupils equal, round, and reactive to light. Extra ocular muscles intact. Conjunctivae/corneas clear. Neck: Supple, with full range of motion. No jugular venous distention. Trachea midline. Respiratory:  Normal respiratory effort. Clear to auscultation, bilaterally without Rales/Wheezes/Rhonchi. Cardiovascular:  Regular rate and rhythm with normal S1/S2 without murmurs, rubs or gallops. Abdomen: Soft, non-tender, non-distended with normal bowel sounds. Musculoskeletal:  No clubbing, cyanosis or edema bilaterally. Full range of motion without deformity. Skin: Skin color, texture, turgor normal.  No rashes or lesions. Neurologic:  Neurovascularly intact without any focal sensory/motor deficits.  Cranial nerves: II-XII intact, grossly non-focal.  Psychiatric:  Alert and oriented, thought content appropriate, normal insight  Capillary Refill: Brisk,< 3 seconds   Peripheral Pulses: +2 palpable, equal bilaterally       Labs:     Recent Labs     03/21/22  0837   WBC 6.6   HGB 13.1   HCT 41.8        Recent Labs     03/21/22  0837      K 3.8      CO2 24   BUN 11   CREATININE 0.6   CALCIUM 9.3     Recent Labs     03/21/22  0837   AST 19   ALT 10   BILITOT 0.5   ALKPHOS 111*     Recent Labs     03/21/22  1348 year ago. She has not been using CPAP. Most of her symptoms may be due to sleep apnea. We will try her on CPAP while she is in the hospital.  9.  Morbid obesity status post gastric bypass surgery    DVT Prophylaxis: Eliquis  Diet: ADULT DIET; Regular; Low Fat/Low Chol/High Fiber/BEVERLEY  Code Status: Full Code    PT/OT Eval Status: As needed    Dispo -inpatient/telemetry       Jocelyn Amin MD    Thank you Ric Fischer DO for the opportunity to be involved in this patient's care.

## 2022-03-21 NOTE — ED PROVIDER NOTES
ED  Provider Note  Admit Date/RoomTime: 3/21/2022  8:04 AM  ED Room: 14A/14A-14     HPI:   Mohan Mujica is a 46 y.o. female presenting to the ED for racing heart rate, beginning last night. History comes primarily from the patient. Patient Active Problem List:     Hypothyroidism     Pituitary disorder ([address]     H/O thyroid nodule     Palpitations     Abscess     Allergy to drug     Anemia, iron deficiency     Anxiety     Autoimmune disease (HCC)     Bloating symptom     Cellulitis     Complication of anesthesia     Compound heterozygous MTHFR mutation [id number]     Dyspnea on exertion     Electrocardiogram abnormal     Essential hypertension, benign     Fibromyalgia     Hx of gastric bypass     Hypocalcemia     Morbid obesity (HCC)     Other and unspecified coagulation defects     Other and unspecified hyperlipidemia     Other and unspecified postsurgical nonabsorption     Pancreatic insufficiency     Paroxysmal atrial fibrillation (HCC)     Pemphigoid     Perimenopause     Ventral hernia     B-complex deficiency     Vitamin D deficiency     Yeast in stool     Paresthesias     Pulsatile tinnitus of right ear     Multinodular goiter  . The complaint has been persistent, moderate in severity, improved by nothing and worsened by nothing. Associated symptoms include none. Martha has a past medical history consistent with atrial fibrillation, however she had successfully converted out of it on her last episode and has been without it for long enough that she was able to be discontinued off of anticoagulation. Yesterday evening she went to bed and this morning when she woke up she was in atrial fibrillation. She stated that in the evening she got multiple notifications that her heart rate was very slow in the 40s, however this morning it was in the 190s and irregular.   This reason she took an Eliquis that was leftover from last time that she had A. fib and presented [facility] [place] Coordination: Coordination normal.          Procedures     MDM  Number of Diagnoses or Management Options  Atrial fibrillation with RVR [facility])  Diagnosis management comments: Emergency department evaluation was notable for findings consistent with atrial fibrillation with rapid ventricular response. Patient did convert with 20 mg of Cardizem administered the emergency department however she was not anticoagulated prior to this morning, and it is unclear when this atrial fibrillation started and she is at high risk for development of clot and requires therapeutic anticoagulation before she can be considered stable for discharge to home. Patient will be assessed with cardiology in the inpatient setting as well. Vital signs been stable of the patient was in the emergency department and she will be admitted to the telemetry service     This information was relayed to the patient who understood this plan of care and was amenable to the plan. Patient was discussed with the admitting service (Dr. Kesha Watson) who concurred with the decision for admission, and have agreed to admit the patient to telemetry. Amount and/or Complexity of Data Reviewed  Clinical lab tests: reviewed  Tests in the radiology section of CPT®: reviewed  Tests in the medicine section of CPT®: reviewed  Decide to obtain previous medical records or to obtain history from someone other than the patient: yes       ED Course as of 03/21/22 1532   Mon Mar 21, 2022   0809 EKG: This EKG is signed and interpreted by me. Rate: 139  Rhythm: Atrial fibrillation  Interpretation: Fib with rapid ventricular response, incomplete right bundle branch block seen on prior EKG, normal QTC, nonspecific ST abnormalities  Comparison: changes compared to previous EKG-NSR on previous EKG   [JA]   6739 I reviewed the patient's chart. ECHO 7/13/21:   Normal left ventricular chamber size. Normal left ventricular systolic function, LVEF is 65%.    Mild left ventricular concentric hypertrophy noted, 1.2cm   Normal LV diastolic function. Left atrium is mildly enlarged (4cm x 6cm). Mildly increased left atrial volume, 78cc. Interatrial septum not well visualized but appears intact. Normal right ventricle size and function. There is trace mitral regurgitation. No mitral valve prolapse. There is mild aortic regurgitation. There is trace tricuspid regurgitation, RVSP 24mmHg. Aortic root 3.4cm. No evidence of pericardial effusion. No intra cardiac mass or thrombus. No comparison study available. [JA]   0869 Last cardiac stress January 2020 [JA]   0957 Patient is a 51-year-old female with history of A. fib previously followed by Dr. Jimena Robbins with EP presenting with tachycardia. She states her symptoms began around 7 AM today. She has been having intermittent chest discomfort. She states that she is previously on Eliquis though she stopped this medication because she was concerned with family history of bleeding. She states that she has personally never had any bleeding, hematemesis, or black or bloody stools. States that she felt like she is back in A. fib this morning, so she took a dose of Eliquis this morning. She had a previous admission for A. fib that she spontaneously converted. She is not currently in a rate control. She states that she has been monitoring her heart rates at home, and her heart rates have been dropping into the 40s but then also increasing into the 140s. She also states that she noted that she is becoming hypoxic at night according to her health loretta on her phone. She does not use a CPAP at night. She states that when she lays flat at night she also feels short of breath. She has some increased pedal edema as well. Patient is in A. fib with RVR on my examination. She is awake and alert in no acute distress. Her blood pressures are stable. She is saturating well on room air. Cardizem has been ordered.   Plan for admission [JA]   1132 REPEAT EKG: This EKG is signed and interpreted by me. Rate: 83  Rhythm: Sinus  Interpretation: Normal sinus rhythm, normal NV interval, normal QRS, normal QT interval, no acute ST or T wave changes  Comparison: changes compared to previous EKG-spontaneous conversion to sinus rhythm     [JA]      ED Course User Index  [JA] Mary Charles MD       --------------------------------------------- PAST HISTORY ---------------------------------------------  Past Medical History:  has a past medical history of Atrial fibrillation ([facility] [address] Obesity, Prolonged emergence from general anesthesia, and Thyroid disease. Past Surgical History:  has a past surgical history that includes Cholecystectomy; Tubal ligation; Gastric bypass surgery;  section; hernia repair; Hysterectomy; Abdomen surgery; Upper gastrointestinal endoscopy (N/A, 2020); and Colonoscopy (N/A, 2020). Social History:  reports that she has never smoked. She has never used smokeless tobacco. She reports that she does not drink alcohol and does not use drugs. Family History: family history is not on file. The patients home medications have been reviewed. Allergies:  Avelox [moxifloxacin hcl in nacl], Cephalexin, Ciprofloxacin, Clindamycin, Coffee bean extract [coffea arabica], Magnesium sulfate, Milk-related compounds, Penicillins, Percocet [oxycodone-acetaminophen], and Sulfa antibiotics    -------------------------------------------------- RESULTS -------------------------------------------------    LABS:  Results for orders placed or performed during the hospital encounter of 22   CBC with Auto Differential   Result Value Ref Range    WBC 6.6 4.5 - 11.5 E9/L    RBC 4.43 3.50 - 5.50 E12/L    Hemoglobin 13.1 11.5 - 15.5 g/dL    Hematocrit 41.8 34.0 - 48.0 %    MCV 94.4 80.0 - 99.9 fL    MCH 29.6 26.0 - 35.0 pg    MCHC 31.3 (L) 32.0 - 34.5 %    RDW 14.6 11.5 - 15.0 fL    Platelets 447 545 - 716 E9/L    MPV 10.7 7.0 - 12.0 fL    Neutrophils % 62.2 43.0 - 80.0 %    Immature Granulocytes % 0.3 0.0 - 5.0 %    Lymphocytes % 25.2 20.0 - 42.0 %    Monocytes % 6.5 2.0 - 12.0 %    Eosinophils % 5.0 0.0 - 6.0 %    Basophils % 0.8 0.0 - 2.0 %    Neutrophils Absolute 4.14 1.80 - 7.30 E9/L    Immature Granulocytes # 0.02 E9/L    Lymphocytes Absolute 1.67 1.50 - 4.00 E9/L    Monocytes Absolute 0.43 0.10 - 0.95 E9/L    Eosinophils Absolute 0.33 0.05 - 0.50 E9/L    Basophils Absolute 0.05 0.00 - 0.20 E9/L   Comprehensive Metabolic Panel w/ Reflex to MG   Result Value Ref Range    Sodium 137 132 - 146 mmol/L    Potassium reflex Magnesium 3.8 3.5 - 5.0 mmol/L    Chloride 100 98 - 107 mmol/L    CO2 24 22 - 29 mmol/L    Anion Gap 13 7 - 16 mmol/L    Glucose 112 (H) 74 - 99 mg/dL    BUN 11 6 - 20 mg/dL    CREATININE 0.6 0.5 - 1.0 mg/dL    GFR Non-African American >60 >=60 mL/min/1.73    GFR African American >60     Calcium 9.3 8.6 - 10.2 mg/dL    Total Protein 7.6 6.4 - 8.3 g/dL    Albumin 4.2 3.5 - 5.2 g/dL    Total Bilirubin 0.5 0.0 - 1.2 mg/dL    Alkaline Phosphatase 111 (H) 35 - 104 U/L    ALT 10 0 - 32 U/L    AST 19 0 - 31 U/L   Troponin   Result Value Ref Range    Troponin, High Sensitivity <6 0 - 9 ng/L   Brain Natriuretic Peptide   Result Value Ref Range    Pro-BNP 39 0 - 125 pg/mL   Lactate, Sepsis   Result Value Ref Range    Lactic Acid, Sepsis 1.6 0.5 - 1.9 mmol/L   Troponin   Result Value Ref Range    Troponin, High Sensitivity 7 0 - 9 ng/L   TSH   Result Value Ref Range    TSH 2.990 0.270 - 4.200 uIU/mL   Magnesium   Result Value Ref Range    Magnesium 2.2 1.6 - 2.6 mg/dL   Protime-INR   Result Value Ref Range    Protime 13.5 (H) 9.3 - 12.4 sec    INR 1.2    EKG 12 Lead   Result Value Ref Range    Ventricular Rate 83 BPM    Atrial Rate 83 BPM    P-R Interval 178 ms    QRS Duration 92 ms    Q-T Interval 384 ms    QTc Calculation (Bazett) 451 ms    P Axis 54 degrees    R Axis -16 degrees    T Axis 43 degrees RADIOLOGY:  XR CHEST PORTABLE   Final Result   No acute process. ------------------------- NURSING NOTES AND VITALS REVIEWED ---------------------------  Date / Time Roomed:  3/21/2022  8:04 AM  ED Bed Assignment:  14A/14A-14    The nursing notes within the ED encounter and vital signs as below have been reviewed. Patient Vitals for the past 24 hrs:   BP Temp Pulse Resp SpO2 Height Weight   03/21/22 1500 124/73 -- 84 18 100 % -- --   03/21/22 1133 117/79 -- 83 18 99 % -- --   03/21/22 1045 -- -- 99 22 96 % -- --   03/21/22 1030 (!) 130/97 -- 140 17 98 % -- --   03/21/22 0953 132/87 -- 137 -- -- -- --   03/21/22 0930 (!) 139/109 -- 140 16 96 % -- --   03/21/22 0813 -- -- -- 18 97 % 5' 3\" (1.6 m) 255 lb (115.7 kg)   03/21/22 0809 (!) 114/90 98 °F (36.7 °C) 145 (!) 2 97 % -- --       Oxygen Saturation Interpretation: Normal    ------------------------------------------ PROGRESS NOTES ------------------------------------------  Re-evaluation(s):  Time: 3:33 PM EDT  Patients symptoms show no change  Repeat physical examination is not changed    Counseling:  I have spoken with the patient and discussed todays results, in addition to providing specific details for the plan of care and counseling regarding the diagnosis and prognosis. Their questions are answered at this time and they are agreeable with the plan of admission.    --------------------------------- ADDITIONAL PROVIDER NOTES ---------------------------------  Consultations:  Time: 3:33 PM EDT. Spoke with Dr. Nicole Gorman. Discussed case. They will admit the patient. This patient's ED course included: a personal history and physicial examination, re-evaluation prior to disposition, multiple bedside re-evaluations, IV medications, cardiac monitoring and continuous pulse oximetry    This patient has remained hemodynamically stable during their ED course. Diagnosis:  1.  Atrial fibrillation with RVR (HCC)        Disposition:  Patient's disposition: Admit to telemetry  Patient's condition is fair. Please note that the withdrawal or failure to initiate urgent interventions for this patient would likely result in a life threatening deterioration or permanent disability. Accordingly this patient received 30 minutes of critical care time, excluding separately billable procedures.        [address] DO  Resident  03/21/22 5905

## 2022-03-21 NOTE — PROGRESS NOTES
Patients home medications collected and placed in med room:     Bottle of prescription eliquis  Bottle of prescription Metoprolol  Advil    Prescription tirosint taken to pharmacy for verification

## 2022-03-22 LAB
ANION GAP SERPL CALCULATED.3IONS-SCNC: 10 MMOL/L (ref 7–16)
BUN BLDV-MCNC: 10 MG/DL (ref 6–20)
CALCIUM SERPL-MCNC: 8.8 MG/DL (ref 8.6–10.2)
CHLORIDE BLD-SCNC: 106 MMOL/L (ref 98–107)
CO2: 25 MMOL/L (ref 22–29)
CREAT SERPL-MCNC: 0.7 MG/DL (ref 0.5–1)
EKG ATRIAL RATE: 150 BPM
EKG Q-T INTERVAL: 282 MS
EKG QRS DURATION: 92 MS
EKG QTC CALCULATION (BAZETT): 429 MS
EKG R AXIS: 68 DEGREES
EKG T AXIS: 14 DEGREES
EKG VENTRICULAR RATE: 139 BPM
GFR AFRICAN AMERICAN: >60
GFR NON-AFRICAN AMERICAN: >60 ML/MIN/1.73
GLUCOSE BLD-MCNC: 87 MG/DL (ref 74–99)
POTASSIUM SERPL-SCNC: 3.8 MMOL/L (ref 3.5–5)
SODIUM BLD-SCNC: 141 MMOL/L (ref 132–146)

## 2022-03-22 PROCEDURE — 6370000000 HC RX 637 (ALT 250 FOR IP): Performed by: STUDENT IN AN ORGANIZED HEALTH CARE EDUCATION/TRAINING PROGRAM

## 2022-03-22 PROCEDURE — 36415 COLL VENOUS BLD VENIPUNCTURE: CPT

## 2022-03-22 PROCEDURE — 93010 ELECTROCARDIOGRAM REPORT: CPT | Performed by: INTERNAL MEDICINE

## 2022-03-22 PROCEDURE — 94660 CPAP INITIATION&MGMT: CPT

## 2022-03-22 PROCEDURE — 99221 1ST HOSP IP/OBS SF/LOW 40: CPT | Performed by: NURSE PRACTITIONER

## 2022-03-22 PROCEDURE — 99223 1ST HOSP IP/OBS HIGH 75: CPT | Performed by: STUDENT IN AN ORGANIZED HEALTH CARE EDUCATION/TRAINING PROGRAM

## 2022-03-22 PROCEDURE — G0378 HOSPITAL OBSERVATION PER HR: HCPCS

## 2022-03-22 PROCEDURE — 2580000003 HC RX 258: Performed by: FAMILY MEDICINE

## 2022-03-22 PROCEDURE — 6370000000 HC RX 637 (ALT 250 FOR IP): Performed by: FAMILY MEDICINE

## 2022-03-22 PROCEDURE — 6370000000 HC RX 637 (ALT 250 FOR IP): Performed by: NURSE PRACTITIONER

## 2022-03-22 PROCEDURE — 2060000000 HC ICU INTERMEDIATE R&B

## 2022-03-22 PROCEDURE — 80048 BASIC METABOLIC PNL TOTAL CA: CPT

## 2022-03-22 RX ORDER — BUSPIRONE HYDROCHLORIDE 5 MG/1
5 TABLET ORAL 3 TIMES DAILY
Status: DISCONTINUED | OUTPATIENT
Start: 2022-03-22 | End: 2022-03-23 | Stop reason: HOSPADM

## 2022-03-22 RX ADMIN — PANTOPRAZOLE SODIUM 20 MG: 40 TABLET, DELAYED RELEASE ORAL at 07:00

## 2022-03-22 RX ADMIN — APIXABAN 5 MG: 5 TABLET, FILM COATED ORAL at 21:00

## 2022-03-22 RX ADMIN — DILTIAZEM HYDROCHLORIDE 30 MG: 30 TABLET, FILM COATED ORAL at 17:36

## 2022-03-22 RX ADMIN — SODIUM CHLORIDE, PRESERVATIVE FREE 10 ML: 5 INJECTION INTRAVENOUS at 09:02

## 2022-03-22 RX ADMIN — BUSPIRONE HYDROCHLORIDE 5 MG: 5 TABLET ORAL at 17:36

## 2022-03-22 RX ADMIN — APIXABAN 5 MG: 5 TABLET, FILM COATED ORAL at 09:02

## 2022-03-22 RX ADMIN — LEVOTHYROXINE SODIUM 112 MCG: 112 TABLET ORAL at 07:00

## 2022-03-22 RX ADMIN — SODIUM CHLORIDE, PRESERVATIVE FREE 10 ML: 5 INJECTION INTRAVENOUS at 20:29

## 2022-03-22 ASSESSMENT — PAIN SCALES - GENERAL
PAINLEVEL_OUTOF10: 0

## 2022-03-22 NOTE — CARE COORDINATION
Care Coordination  Met with patient in room to discuss transition of care planning. The patient lives in a [address] with her  and daughters. Patient was independent prior to admission. Her primary Physician is Dr Deng So and her pharmacy is [facility]. Her plan is to return home once stable and her ride home is her . Patient was admitted due to chest pain and racing heart. Patient not sleeping well  has had bouts of atrial fib over the last year. She stopped taking her Eliquis and lopressor and has been having anxiety attacks. EP and Cardiology on consult. Psych on consult . The patient is on room air.  Will follow

## 2022-03-22 NOTE — CONSULTS
Reason for consult: anxiety      Consulting Physician: Dr Marylou Shelley       HPI:  46 y.o. female who presented to [facility] with medical history of hypothyroidism, morbid obesity status post gastric bypass surgery 16 years ago, atrial fibrillation diagnosed in May 2021, took herself off anticoagulation, not on any rate control agents, vertigo, seizure following eclampsia, and COVID-19 viral infection in October 2020. Patient has been having chest pain on and off for the last 2 weeks. Pain is mostly in the left side of the chest, described as heaviness, radiates towards the left nipple and back, mild in intensity and associated with diaphoresis, palpitations, bradycardia, heart rates in the 40s, shortness of breath and episodes of hypoxia, saturations in the 80s at home and indigestion. She also states that she has been having trouble sleeping. She reports an extensive medical history including Hypothyroidism, Pituitary disorder ([address]  H/O thyroid nodule, Palpitations, Abscess   Allergy to multiple drug, Anemia, iron deficiency, Anxiety, Autoimmune disease ([address] Bloating symptom, Cellulitis, Compound heterozygous MTHFR mutation [id number]   Dyspnea on exertion, Electrocardiogram abnormal, Essential hypertension, Fibromyalgia,    Hx of gastric bypass, Hypocalcemia, Morbid obesity, Other and unspecified postsurgical nonabsorption, Pancreatic insufficiency, Paroxysmal atrial fibrillation ([address] Pemphigoid,  Perimenopause, Ventral hernia, B-complex deficiency, Vitamin D deficiency, Yeast in stool   Paresthesias and multinodular goiter. Psychiatry was consulted for anxiety. Upon assessment today the patient is focused on her multiple health concerns. She states that she has multiple drug sensitivities, she states she has side effects to most medications including vitamins. She denies suicidal or homicidal ideation intent or plan. She is not psychotic, denies any auditory or visual hallucinations.  She is rather troubled by her physical health concerns along with many health issues in her family including her children. She states that she is overwhelmed with her anxiety and relates this to both her health concerns and her family health concerns. Yet she has not been to counseling in over a year and a half and she has not been seeing a psychiatrist for medication management. Patient is counseled that the gold standard treatment for anxiety is counseling called CBT. When she is educated on this point, she states that she has been taking ativan at night and this helps her but she does not have it prescribed for the day time. She also mentions that she has been prescribed prozac in the past but had side effects. Due to her complaints of POTS, A-fib and other significant health concerns, psychiatry would not recommend use of ativan or aggressive management of anxiety with medications as this could drop blood pressure further and increase hypotension. Patient encouraged to seek counseling to help her with her stressors. The risk of aggressive treatment with psychotropic medications does not outweigh the whole health benefit of this patient considering her significant health concerns and her additional reports of medication sensitivities. At this time we recommend buspar 5 mg TID for her anxiety. MSE  Mental status examination reveals a 71-year-old  female average hygiene average grooming is forthcoming on assessment. Psychomotor reveals no agitation retardation involuntary movement or abnormal posture. Speech is normal rate volume tone well articulated. Eye contact is good. Mood is \"I am anxious. \"  Affect is congruent with stated mood she does appear worried and preoccupied. Thought process is linear goal-directed and future focused. Thought content is devoid of auditory or visual hallucination she is devoid of suicidal or homicidal ideation intent or plan.   Memory is intact during conversation she appears to be a good historian. Cognitive function appears to be baseline. Insight judgment impulse control are adequate. And she is alert and oriented to person place time situation and can easily recount events leading to hospitalization. DX  Anxiety       Plan/Recommendations: The patient case, plan of care and recommendations has been discussed with Dr Delores Hull and the collaborative psychiatric care team.     At this time we recommend buspar 5 mg TID for her anxiety. We do not recommend aggressive treatment with psychotropic medications as the risks do not outweigh the benefit to her health. Recommended patient schedule with a counselor for CBT which is the recommended intervention for anxiety. Treatment of anxiety can be handled on an outpatient basis. Unit SW can schedule patient for intake with an OP provider if patient agreeable. The patient is not suicidal, homicidal, psychotic or manic and does not meet criteria for inpatient psychiatric hospitalization. Thank you for the consult. Psychiatry signs off.

## 2022-03-22 NOTE — CONSULTS
[address] ELECTROPHYSIOLOGY DEPARTMENT/DIVISION OF CARDIOLOGY  Inpatient consultation Report  PATIENT: Sabra Nino  MEDICAL RECORD NUMBER: [id number]  DATE OF SERVICE:  3/22/2022  ATTENDING ELECTROPHYSIOLOGIST:  Gulshan Johnson DO  Established electrophysiologist: Dr. Alexander Ng  REFERRING PHYSICIAN: No ref. provider found and Alfonso Couch DO  CHIEF COMPLAINT: atrial fibrillation    HPI: Sabra Nino is a 46 y.o. female with a history of nonvalvular paroxysmal AF, HTN, morbid obesity despite prior gastric bypass, CLINTON-noncompliant with CPAP, GERD, anxiety disorder, and hypothyroidism. She is managed by Dr Alexander Ng with apixaban 5 mg twice daily. In May 2021, she was diagnosed with nonvalvular paroxysmal AF. Her AF symptoms are palpitations. She believes sleep is a trigger. She has a history of CLINTON, but reportedly noncompliant with CPAP. On 3/21/2022, she presented with a chief complaint of palpitations which occurred during sleep. An EKG in the ER reportedly had AF with RVR at 139 bpm.  She reports none compliance with CPAP, despite being aware that she is becoming hypoxic at night based on her home oximeter reading. She was treated with Cardizem. Shortly after this she spontaneously converted to sinus rhythm. EP service is now consulted for further evaluation and management of atrial fibrillation. In the past she has declined AV bridget inhibitors due to concern for asymptomatic sinus bradycardia while sleeping in the 40s. Despite counseling that this is not a concerning issue she continues to decline appropriate therapy. Patient denies any other complaints at this time. Prior cardiac testing:  · ECG (3/21/2022): Sinus rhythm at 83 bpm.  · 48-hour Holter monitor (2/2/2022):  Sinus rhythm, rare PACs and PVCs, 2 episodes of nonsustained SVT (longest 5 beats), patient events without clear correlation to dysrhythmia, no AF, VT, advanced AV block, or significant pauses. · 14-day event monitor (2021): SR 40 to 148 bpm (mean: 65 bpm), 26 patient events without clear correlation to dysrhythmias, SVE burden less than 1%, VE burden less than 1%, 1 episode of asymptomatic nonsustained VT (6 beats), 13 episodes of nonsustained SVT (longest: 12 seconds at 111 bpm, fastest: 167 bpm for 18 beats), no AF, advanced AV block or significant pauses. · TTE (2021): LVEF =65%, mild concentric LVH (1.2 cm), mild LAE, mild AI. · Exercise nuclear stress test (1/3/2020): LVEF =70%, normal perfusion at 95% of age-predicted maximal heart rate, 6.4 METS. Past Medical History:   Diagnosis Date    Atrial fibrillation (HCC)     Obesity     Prolonged emergence from general anesthesia     SENSITIVE TO MEDS, TAKES LESS TO PUT HER UNDER, DIFFICULTY WAKING, STOPS BREATHING PER PATIENT    Thyroid disease      Past Surgical History:   Procedure Laterality Date    ABDOMEN SURGERY       SECTION      x2    CHOLECYSTECTOMY      COLONOSCOPY N/A 2020    COLORECTAL CANCER SCREENING, HIGH RISK performed by Jeferson Garcia MD at [address]      UPPER GASTROINTESTINAL ENDOSCOPY N/A 2020    EGD BIOPSY performed by Jeferson Garcia MD at [address]      History reviewed. No pertinent family history.   There is no family history of sudden cardiac arrest    Social History     Tobacco Use    Smoking status: Never Smoker    Smokeless tobacco: Never Used   Substance Use Topics    Alcohol use: No     Current Facility-Administered Medications   Medication Dose Route Frequency Provider Last Rate Last Admin    pantoprazole (PROTONIX) tablet 20 mg  20 mg Oral QAM AC Alida Combs MD   20 mg at 22 0700    levothyroxine (TIROSINT) tablet 112 mcg  112 mcg Oral Daily Alida Combs MD   112 mcg at 22 0700    sodium chloride flush 0.9 % injection 5-40 mL  5-40 mL IntraVENous 2 times per day Yaima Kline MD   10 mL at 03/22/22 0902    sodium chloride flush 0.9 % injection 5-40 mL  5-40 mL IntraVENous PRN Yaima Kline MD        0.9 % sodium chloride infusion  25 mL IntraVENous PRN Yaima Kline MD        ondansetron (ZOFRAN-ODT) disintegrating tablet 4 mg  4 mg Oral Q8H PRN Yaima Kline MD        Or    ondansetron (ZOFRAN) injection 4 mg  4 mg IntraVENous Q6H PRN Yaima Kline MD        polyethylene glycol (GLYCOLAX) packet 17 g  17 g Oral Daily PRN Yaima Kline MD        perflutren lipid microspheres (DEFINITY) injection 1.65 mg  1.5 mL IntraVENous ONCE PRN Yaima Kilne MD        melatonin disintegrating tablet 5 mg  5 mg Oral Nightly Yaima Kline MD        apixaban (ELIQUIS) tablet 5 mg  5 mg Oral BID Yaima Kline MD   5 mg at 03/22/22 0902    LORazepam (ATIVAN) tablet 0.5 mg  0.5 mg Oral Q6H PRN Yaima Kline MD          Allergies   Allergen Reactions    Avelox [Moxifloxacin Hcl In Nacl]     Cephalexin     Ciprofloxacin     Clindamycin     Coffee Bean Extract [Coffea Arabica]     Magnesium Sulfate     Milk-Related Compounds     Penicillins     Percocet [Oxycodone-Acetaminophen]     Sulfa Antibiotics      ROS:   Constitutional: Negative for fever, activity change and appetite change. HENT: Negative for epistaxis. Eyes: Negative for diploplia, blurred vision. Respiratory: Negative for cough, chest tightness, shortness of breath and wheezing. Cardiovascular: pertinent positives in HPI  Gastrointestinal: Negative for abdominal pain and blood in stool. Genitourinary: Negative for hematuria and difficulty urinating. Musculoskeletal: Negative for myalgias and gait problem. Skin: Negative for color change and rash. Neurological: Negative for syncope and light-headedness. Psychiatric/Behavioral: Negative for confusion and agitation. The patient is not nervous/anxious.   Heme: no bleeding disorders, no melena or hematochezia  All other review of systems are negative     PHYSICAL EXAM:  Vitals:    03/22/22 0258 03/22/22 0426 03/22/22 0645 03/22/22 0730   BP:  137/89  120/75   Pulse: 62 58  65   Resp:  16  18   Temp:  97.9 °F (36.6 °C)  96.6 °F (35.9 °C)   TempSrc:  Temporal  Temporal   SpO2: 94% 95%  97%   Weight:   250 lb (113.4 kg)    Height:       Limited exam due to COVID-19 pandemic. Constitutional: NAD, obese  Head: Normocephalic and atraumatic. Neck: supple and no JVD present  Cardiovascular: RRR  Pulmonary/Chest:  No respiratory distress, no audible wheeze  Abdominal: nondistended  Musculoskeletal: Normal range of motion of all extremities  Neurological: Alert & O x 3, grossly intact  Skin: Skin is warm and dry  Extremity: no edema   Psychiatric: Normal mood and affect, A&O x3    Data:    Recent Labs     03/21/22  0837   WBC 6.6   HGB 13.1   HCT 41.8        Recent Labs     03/21/22  0837 03/22/22  0639    141   K 3.8 3.8    106   CO2 24 25   BUN 11 10   CREATININE 0.6 0.7   CALCIUM 9.3 8.8     Recent Labs     03/21/22  1348   INR 1.2     Recent Labs     03/21/22  1058   TSH 2.990     Lab Results   Component Value Date    MG 2.2 03/21/2022     Telemetry: sinus rhythm with HR = 55 -74 bpm    Assessment/Plan:  1. Nonvalvular paroxysmal Atrial Fibrillation  -Initially diagnosed in May 2021.  -CHADSVASC = 2 (HTN, female). Recommend [address] in females with score greater than or equal to 2. DOAC preferred  -Continue apixaban 5 mg twice daily. Recommend annual CBC and CMP. -Symptomatic recurrences triggered by sleep in the setting of untreated sleep apnea due to noncompliance with CPAP. Recommend compliance with CPAP. I discussed options of antiarrhythmic therapy with patient. She is resistant to medications in the past due to concern for asymptomatic sinus bradycardia at rest in the 40s, particularly during sleep.   I discussed pathophysiology of sleep apnea extensively with patient and how this can cause episodes of bradycardia as well as atrial fibrillation. She is now agreeable to low-dose diltiazem. -At this point she is not a candidate for AF ablation due to morbid obesity despite prior diet gastric bypass surgery.  -I reviewed modifiable risk factors:  --Obesity: BMI goal less than 27. Currently her obesity is extremely uncontrolled, despite prior gastric bypass surgery. Recommend diet and exercise. Recommend referral to bariatric weight loss program or dietitian.  --CLINTON: Noncompliant with CPAP. Recommend outpatient referral to sleep medicine (Dr. Dwight Grimaldo). --HTN: BP goal < 130/80 mmHg. --EtOH: she reports no EtOH use. -EP service will sign off. Please contact with questions or concerns.  -Recommend outpatient follow-up with EP/Dr Victoria in 3 months. The office will arrange. 2.  Anxiety  -I suspect this is underlying issue causing the majority of her other medical problems or at least have a significant impact on them. She is currently being evaluated by psychiatry.  -She is concerned that she has POTS, however orthostatic vital signs not consistent with POTS. I spent a total of 60 minutes reviewing previous notes, test results, and face to face with the patient discussing the diagnosis and importance of compliance with the treatment plan as well as documenting on the day of the visit. Time of the day of service includes:  · Preparing to see the patient (eg. Review of the medical record, such as tests). · Obtaining and/or reviewing separately obtained history. · Ordering prescription medications, tests, and/or procedures. · Communicating results to the patient/family/caregiver. · Counseling/educating the patient/family/caregiver. · Documenting clinical information in the patients electronic record. · Coordination of care for the patient. · Performing a medical appropriate exam and/or evaluation.     Thank you for allowing me to participate in your patient's care.  Please call me if there are any questions. Hardeep Jolly D.O.   Cardiac Electrophysiology  [address] [place] [address]

## 2022-03-22 NOTE — PROGRESS NOTES
Date: 3/21/2022    Time: 10:35 PM    Patient Placed On BIPAP/CPAP/ Non-Invasive Ventilation? Yes    If no must comment. Facial area red/color change? No           If YES are Blister/Lesion present? No   If yes must notify nursing staff  BIPAP/CPAP skin barrier?   Yes    Skin barrier type:mepilexlite       Comments:        Renzo Villarreal RCP

## 2022-03-22 NOTE — PROGRESS NOTES
Hospitalist Progress Note      SYNOPSIS: Patient admitted on 3/21/2022     46 y.o. female who presented to [facility] with medical history of hypothyroidism, morbid obesity status post gastric bypass surgery 16 years ago, atrial fibrillation diagnosed in May 2021, took herself off anticoagulation, not on any rate control agents, vertigo, seizure following eclampsia, and COVID-19 viral infection in 2020. Patient has been having chest pain on and off for the last 2 weeks  She has not had bouts of atrial fibrillation for at least a year. She stopped using her Eliquis and never started her Lopressor. No leg swelling. She is undergoing a lot of psychosocial stressors   Admission Vital signs notable for heart rate in the 140s, respiratory 22, labs showed troponin of 6-7, normal CBC and chemistry, TSH 2.99. She had 48-hour Holter in February that showed 2 occurrences of SVT, few PACs/PVCs, no A. fib. Last documented echo was in 2021, EF was 65%. Last documented stress test was in  which was negative with EF greater than 70%. EKG today initially in the ER showed A. fib RVR rate of 139 She converted to sinus rhythm after receiving bolus of Cardizem and rate controlled. SUBJECTIVE:    Patient seen and examined  Records reviewed. The pt has multiple questions about her condition. All questions were answered. She denies any chest pain at this time. She has apple watch and has been monitoring herself on regular basis. Temp (24hrs), Av.9 °F (36.6 °C), Min:96.6 °F (35.9 °C), Max:98.4 °F (36.9 °C)    DIET: ADULT DIET; Regular; Low Fat/Low Chol/High Fiber/BEVERLEY  CODE: Full Code  No intake or output data in the 24 hours ending 22 1430    OBJECTIVE:    /75   Pulse 65   Temp 96.6 °F (35.9 °C) (Temporal)   Resp 18   Ht 5' 3\" (1.6 m)   Wt 250 lb (113.4 kg)   SpO2 97%   BMI 44.29 kg/m²     General appearance: No apparent distress, appears stated age and cooperative.   HEENT: Conjunctivae/corneas clear. Neck: Supple. No jugular venous distention. Respiratory: Clear to auscultation bilaterally, normal respiratory effort  Cardiovascular: Regular rate rhythm, normal S1-S2  Abdomen: Soft, nontender, nondistended  Musculoskeletal: No clubbing, cyanosis, no bilateral lower extremity edema. Brisk capillary refill. Skin:  No rashes  on visible skin  Neurologic: awake, alert and following commands     ASSESSMENT and PLAN:    A-fib with RVR  -Cardizem bolus given in ED rate controlled  -Eliquis resumed  -EP consulted. -The pt has stopped BB as well in the past.     Severe Anxiety disorder   -Doubt if watch is helpful at this point as significant contribution to her anxiety with constant monitoring and loss of sleep at night. Sleep Apnea  -Pt stopped CPAP as it was lost while she was moving  -Resume CPAP    Hypothyroidism  -Cont synthroid  -TSH 2.990    Cont Protonix      DISPOSITION:     Medications:  REVIEWED DAILY    Infusion Medications    sodium chloride       Scheduled Medications    pantoprazole  20 mg Oral QAM AC    levothyroxine  112 mcg Oral Daily    sodium chloride flush  5-40 mL IntraVENous 2 times per day    melatonin  5 mg Oral Nightly    apixaban  5 mg Oral BID     PRN Meds: sodium chloride flush, sodium chloride, ondansetron **OR** ondansetron, polyethylene glycol, perflutren lipid microspheres, LORazepam    Labs:     Recent Labs     03/21/22  0837   WBC 6.6   HGB 13.1   HCT 41.8          Recent Labs     03/21/22  0837 03/22/22  0639    141   K 3.8 3.8    106   CO2 24 25   BUN 11 10   CREATININE 0.6 0.7   CALCIUM 9.3 8.8       Recent Labs     03/21/22  0837   PROT 7.6   ALKPHOS 111*   ALT 10   AST 19   BILITOT 0.5       Recent Labs     03/21/22  1348   INR 1.2       No results for input(s): CKTOTAL, TROPONINI in the last 72 hours.     Chronic labs:    Lab Results   Component Value Date    CHOL 218 (H) 07/06/2021    TRIG 95 07/06/2021    HDL 77 07/06/2021    LDLCALC 122 (H) 07/06/2021    TSH 2.990 03/21/2022    INR 1.2 03/21/2022    LABA1C 4.8 07/06/2021       Radiology: REVIEWED DAILY    +++++++++++++++++++++++++++++++++++++++++++++++++  Kinjal Hale MD  [facility] - [address] [place] [address]  +++++++++++++++++++++++++++++++++++++++++++++++++  NOTE: This report was transcribed using voice recognition software. Every effort was made to ensure accuracy; however, inadvertent computerized transcription errors may be present.

## 2022-03-23 VITALS
WEIGHT: 253.4 LBS | DIASTOLIC BLOOD PRESSURE: 88 MMHG | HEART RATE: 92 BPM | BODY MASS INDEX: 44.9 KG/M2 | TEMPERATURE: 97.2 F | RESPIRATION RATE: 18 BRPM | OXYGEN SATURATION: 98 % | SYSTOLIC BLOOD PRESSURE: 135 MMHG | HEIGHT: 63 IN

## 2022-03-23 DIAGNOSIS — R00.2 PALPITATIONS: Primary | ICD-10-CM

## 2022-03-23 LAB
ANION GAP SERPL CALCULATED.3IONS-SCNC: 10 MMOL/L (ref 7–16)
BUN BLDV-MCNC: 10 MG/DL (ref 6–20)
CALCIUM SERPL-MCNC: 8.6 MG/DL (ref 8.6–10.2)
CHLORIDE BLD-SCNC: 107 MMOL/L (ref 98–107)
CO2: 27 MMOL/L (ref 22–29)
CREAT SERPL-MCNC: 0.6 MG/DL (ref 0.5–1)
GFR AFRICAN AMERICAN: >60
GFR NON-AFRICAN AMERICAN: >60 ML/MIN/1.73
GLUCOSE BLD-MCNC: 83 MG/DL (ref 74–99)
LV EF: 65 %
LVEF MODALITY: NORMAL
POTASSIUM SERPL-SCNC: 4.1 MMOL/L (ref 3.5–5)
REASON FOR REJECTION: NORMAL
REJECTED TEST: NORMAL
SODIUM BLD-SCNC: 144 MMOL/L (ref 132–146)

## 2022-03-23 PROCEDURE — 93306 TTE W/DOPPLER COMPLETE: CPT

## 2022-03-23 PROCEDURE — 6370000000 HC RX 637 (ALT 250 FOR IP): Performed by: FAMILY MEDICINE

## 2022-03-23 PROCEDURE — 2580000003 HC RX 258: Performed by: FAMILY MEDICINE

## 2022-03-23 PROCEDURE — G0378 HOSPITAL OBSERVATION PER HR: HCPCS

## 2022-03-23 PROCEDURE — 6370000000 HC RX 637 (ALT 250 FOR IP): Performed by: NURSE PRACTITIONER

## 2022-03-23 PROCEDURE — 6370000000 HC RX 637 (ALT 250 FOR IP): Performed by: STUDENT IN AN ORGANIZED HEALTH CARE EDUCATION/TRAINING PROGRAM

## 2022-03-23 PROCEDURE — 36415 COLL VENOUS BLD VENIPUNCTURE: CPT

## 2022-03-23 PROCEDURE — 80048 BASIC METABOLIC PNL TOTAL CA: CPT

## 2022-03-23 PROCEDURE — 94660 CPAP INITIATION&MGMT: CPT

## 2022-03-23 RX ORDER — BUSPIRONE HYDROCHLORIDE 5 MG/1
5 TABLET ORAL 3 TIMES DAILY
Qty: 90 TABLET | Refills: 0 | Status: SHIPPED | OUTPATIENT
Start: 2022-03-23

## 2022-03-23 RX ADMIN — DILTIAZEM HYDROCHLORIDE 30 MG: 30 TABLET, FILM COATED ORAL at 13:55

## 2022-03-23 RX ADMIN — DILTIAZEM HYDROCHLORIDE 30 MG: 30 TABLET, FILM COATED ORAL at 00:05

## 2022-03-23 RX ADMIN — SODIUM CHLORIDE, PRESERVATIVE FREE 10 ML: 5 INJECTION INTRAVENOUS at 10:10

## 2022-03-23 RX ADMIN — LEVOTHYROXINE SODIUM 112 MCG: 112 TABLET ORAL at 07:00

## 2022-03-23 RX ADMIN — BUSPIRONE HYDROCHLORIDE 5 MG: 5 TABLET ORAL at 10:10

## 2022-03-23 RX ADMIN — DILTIAZEM HYDROCHLORIDE 30 MG: 30 TABLET, FILM COATED ORAL at 07:00

## 2022-03-23 RX ADMIN — BUSPIRONE HYDROCHLORIDE 5 MG: 5 TABLET ORAL at 13:54

## 2022-03-23 RX ADMIN — APIXABAN 5 MG: 5 TABLET, FILM COATED ORAL at 10:10

## 2022-03-23 ASSESSMENT — PAIN SCALES - GENERAL
PAINLEVEL_OUTOF10: 0

## 2022-03-23 NOTE — DISCHARGE SUMMARY
Hospitalist Discharge Summary    Patient ID: Shruthi Draper   Patient : 1970  Patient's PCP: Noralee Angelucci, DO    Admit Date: 3/21/2022   Admitting Physician: Alejandra De La Rosa MD    Discharge Date:  3/23/2022   Discharge Physician: Dixon Hamman, MD   Discharge Condition: Stable  Discharge Disposition: [facility] course in brief:  (Please refer to daily progress notes for a comprehensive review of the hospitalization by requesting medical records)    Patient admitted on 3/21/2022 . 51 y.o. female who presented to [facility] with medical history of hypothyroidism, morbid obesity status post gastric bypass surgery 16 years ago, atrial fibrillation diagnosed in May 2021, took herself off anticoagulation, not on any rate control agents, vertigo, seizure following eclampsia, and COVID-19 viral infection in 2020. Dolores Rock has been having chest pain on and off for the last 2 weeks  She has not had bouts of atrial fibrillation for at least a year. Rosalie Triplett stopped using her Eliquis and never started her Lopressor.  No leg swelling. Rosalie Triplett is undergoing a lot of psychosocial stressors   Admission Vital signs notable for heart rate in the 140s, respiratory 22, labs showed troponin of 6-7, normal CBC and chemistry, TSH 2.99.  She had 48-hour Holter in February that showed 2 occurrences of SVT, few PACs/PVCs, no A. fib.  Last documented echo was in 2021, EF was 65%.  Last documented stress test was in  which was negative with EF greater than 70%.  EKG today initially in the ER showed A. fib RVR rate of 139 She converted to sinus rhythm after receiving bolus of Cardizem and rate controlled. The patient has history of sleep apnea. She is to be on CPAP and the patient is not compliant. The patient was given a CPAP while in the hospital, she refused. Patient understands the risk of worsening atrial fibrillation with her sleep apnea.   Patient also refused beta-blockers due to history of bradycardia. EP also saw the patient. The patient was started on Cardizem 30 mg every 6 hours. Electrophysiology did not think the patient has POTS. Psychiatry also saw the patient for severe anxiety. Patient is to follow-up with outpatient behavioral therapy. Patient was started on BuSpar 5 mg 3 times daily. On the day of discharge patient does not have any chest pain. She does have underlying severe anxiety. . Electrophysiology has cleared the patient and signed off. The patient is stable for discharge home today. Gianni Hooker He is to get an echocardiogram as an outpatient. Consults:   IP CONSULT TO HOSPITALIST  IP CONSULT TO CARDIOLOGY  IP CONSULT TO PSYCHIATRY  IP CONSULT TO ELECTROPHYSIOLOGY    Discharge Diagnoses:    A-fib with RVR-rate controlled  -Cardizem bolus given in ED rate controlled  -Eliquis resumed  -EP consulted. The patient is to follow-up with EP as an outpatient  -The pt has stopped BB as well in the past.  She refused beta-blockers to EP. She agrees to Cardizem     Severe Anxiety disorder   -Psychiatry saw the patient  -BuSpar 5 mg 3 times daily initiated  -Patient requires outpatient behavioral therapy     Sleep Apnea  -Pt stopped CPAP as it was lost while she was moving  -The patient needs to call Dr. Helio Carrasquillo to schedule an appointment for her sleep study and resumption of CPAP.     Hypothyroidism  -Cont synthroid  -TSH 2.990     Discharge Instructions / Follow up: Follow-up with PCP within a week  Follow-up with electrophysiology in 4 weeks  Follow-up with outpatient psychiatry and behavioral therapy for anxiety  Patient needs an outpatient echo cardiogram per PCP  Patient is to get sleep study as an outpatient.   She needs a CPAP    Future Appointments   Date Time Provider Ann Plasencia   5/9/2022  9:30 AM George Medrano MD [facility]       Continued appropriate risk factor modification of blood pressure, diabetes and serum lipids will remain essential to reducing risk of future atherosclerotic development    Activity: activity as tolerated    Significant labs:  CBC:   Recent Labs     03/21/22  0837   WBC 6.6   RBC 4.43   HGB 13.1   HCT 41.8   MCV 94.4   RDW 14.6        BMP:   Recent Labs     03/21/22  0837 03/21/22  1348 03/22/22  0639 03/23/22  0657     --  141 144   K 3.8  --  3.8 4.1     --  106 107   CO2 24  --  25 27   BUN 11  --  10 10   CREATININE 0.6  --  0.7 0.6   MG  --  2.2  --   --      LFT:  Recent Labs     03/21/22  0837   PROT 7.6   ALKPHOS 111*   ALT 10   AST 19   BILITOT 0.5     PT/INR:   Recent Labs     03/21/22  1348   INR 1.2     BNP: No results for input(s): BNP in the last 72 hours. Hgb A1C:   Lab Results   Component Value Date    LABA1C 4.8 07/06/2021     Folate and B12:   Lab Results   Component Value Date    YLCQCIZQ56 577 07/06/2021   ,   Lab Results   Component Value Date    FOLATE 13.7 07/06/2021     Thyroid Studies:   Lab Results   Component Value Date    TSH 2.990 03/21/2022    K1QRKPE 82.29 06/06/2021    H5NWVRZ 7.1 10/22/2021       Urinalysis:    Lab Results   Component Value Date    NITRU Negative 07/05/2021    BLOODU Negative 07/05/2021    SPECGRAV <=1.005 07/05/2021    GLUCOSEU Negative 07/05/2021       Imaging:  XR CHEST PORTABLE    Result Date: 3/21/2022  EXAMINATION: ONE XRAY VIEW OF THE CHEST 3/21/2022 8:53 am COMPARISON: 07/05/2021 HISTORY: ORDERING SYSTEM PROVIDED HISTORY: SOB TECHNOLOGIST PROVIDED HISTORY: Reason for exam:->SOB What reading provider will be dictating this exam?->CRC FINDINGS: The lungs are without acute focal process. There is no effusion or pneumothorax. The cardiomediastinal silhouette is without acute process. The osseous structures are without acute process. No acute process.        Discharge Medications:      Medication List      START taking these medications    busPIRone 5 MG tablet  Commonly known as: BUSPAR  Take 1 tablet by mouth 3 times daily     dilTIAZem 30 MG tablet  Commonly known as: CARDIZEM  Take 1 tablet by mouth 4 times daily        CONTINUE taking these medications    Eliquis 5 MG Tabs tablet  Generic drug: apixaban     LORazepam 0.5 MG tablet  Commonly known as: ATIVAN     Tirosint 112 MCG Caps  Generic drug: Levothyroxine Sodium  Take 1 capsule daily           Where to Get Your Medications      These medications were sent to [facility] [address] [address]    Phone: [phone]   · busPIRone 5 MG tablet  · dilTIAZem 30 MG tablet         Time Spent on discharge is more than 45 minutes in the examination, evaluation, counseling and review of medications and discharge plan.    +++++++++++++++++++++++++++++++++++++++++++++++++  Patrice Tang MD  [address]  +++++++++++++++++++++++++++++++++++++++++++++++++  NOTE: This report was transcribed using voice recognition software. Every effort was made to ensure accuracy; however, inadvertent computerized transcription errors may be present.

## 2022-03-23 NOTE — PROGRESS NOTES
CLINICAL PHARMACY NOTE: MEDS TO BEDS    Total # of Prescriptions Filled: 2   The following medications were delivered to the patient:  · Diltiazem 30  · Buspirone 5    Additional Documentation:    Notified pt to refill diltiazem only had enough for 18 days In stock

## 2022-03-23 NOTE — PROGRESS NOTES
Called EP department so ECHO can be scheduled outpatient. EP said they will schedule. Also, stress/ ECHO number was given so pt can call.  Number is [phone]

## 2022-03-24 ENCOUNTER — TELEPHONE (OUTPATIENT)
Dept: SLEEP CENTER | Age: 52
End: 2022-03-24

## 2022-03-24 NOTE — TELEPHONE ENCOUNTER
Pt called in stating she was recently in the hospital for a-fib. She was told by Dr in hospital to make appt with  Dr Zbigniew Torrez for evaluation of CLINTON. I let her know Dr Zbigniew Torrez has available appt on 4/20. Pt agreed to see Dr Luis Miguel Coleman because she can get in sooner with him.   appt made for 3/31

## 2022-03-31 ENCOUNTER — OFFICE VISIT (OUTPATIENT)
Dept: SLEEP CENTER | Age: 52
End: 2022-03-31
Payer: COMMERCIAL

## 2022-03-31 VITALS
OXYGEN SATURATION: 98 % | RESPIRATION RATE: 20 BRPM | HEART RATE: 75 BPM | BODY MASS INDEX: 45.75 KG/M2 | HEIGHT: 64 IN | SYSTOLIC BLOOD PRESSURE: 141 MMHG | DIASTOLIC BLOOD PRESSURE: 84 MMHG | WEIGHT: 268 LBS

## 2022-03-31 DIAGNOSIS — F41.9 ANXIETY: ICD-10-CM

## 2022-03-31 DIAGNOSIS — E66.9 OBESITY, UNSPECIFIED CLASSIFICATION, UNSPECIFIED OBESITY TYPE, UNSPECIFIED WHETHER SERIOUS COMORBIDITY PRESENT: ICD-10-CM

## 2022-03-31 DIAGNOSIS — G47.00 INSOMNIA, UNSPECIFIED TYPE: ICD-10-CM

## 2022-03-31 DIAGNOSIS — G47.33 OSA (OBSTRUCTIVE SLEEP APNEA): Primary | ICD-10-CM

## 2022-03-31 PROCEDURE — 99204 OFFICE O/P NEW MOD 45 MIN: CPT | Performed by: STUDENT IN AN ORGANIZED HEALTH CARE EDUCATION/TRAINING PROGRAM

## 2022-03-31 ASSESSMENT — SLEEP AND FATIGUE QUESTIONNAIRES
HOW LIKELY ARE YOU TO NOD OFF OR FALL ASLEEP IN A CAR, WHILE STOPPED FOR A FEW MINUTES IN TRAFFIC: 0
HOW LIKELY ARE YOU TO NOD OFF OR FALL ASLEEP WHEN YOU ARE A PASSENGER IN A CAR FOR AN HOUR WITHOUT A BREAK: 0
HOW LIKELY ARE YOU TO NOD OFF OR FALL ASLEEP WHILE SITTING QUIETLY AFTER LUNCH WITHOUT ALCOHOL: 3
HOW LIKELY ARE YOU TO NOD OFF OR FALL ASLEEP WHILE SITTING INACTIVE IN A PUBLIC PLACE: 1
HOW LIKELY ARE YOU TO NOD OFF OR FALL ASLEEP WHILE WATCHING TV: 2
HOW LIKELY ARE YOU TO NOD OFF OR FALL ASLEEP WHILE SITTING AND READING: 2
HOW LIKELY ARE YOU TO NOD OFF OR FALL ASLEEP WHILE SITTING AND TALKING TO SOMEONE: 0
ESS TOTAL SCORE: 10
HOW LIKELY ARE YOU TO NOD OFF OR FALL ASLEEP WHILE LYING DOWN TO REST IN THE AFTERNOON WHEN CIRCUMSTANCES PERMIT: 2

## 2022-03-31 NOTE — PROGRESS NOTES
[facility] Sleep Medicine    Patient Name: Sofía Diehl  Age: 46 y.o.   : 1970    Date of Visit: 3/31/22        CHA Diehl is a 46 y.o. female with  has a past medical history of Atrial fibrillation ([address] Obesity, Prolonged emergence from general anesthesia, and Thyroid disease. who presents as a new patient to Sleep Clinic, referred by Dr. Darshana hayes. provider found, for Sleep Apnea    Patient presents today to establish with sleep medicine. Patient was diagnosed with sleep apnea 1 year ago, at [facility]. No sleep study is available today. She was started on Pap therapy in an unknown pressure setting. She reports that he she used it for several days before her move. Device was displaced during this time and by the time it was found, patient was requested to return the device due to noncompliance. Her initial and return sleep symptoms include dry eyes and dry mouth at night. Patient also presents today as part of the work-up for her atrial fibrillation, which was first diagnosed in May 2021 and a subsequent inpatient stay 1 week ago. Also of note, the patient reports a longstanding history of difficulty falling and staying asleep. Sleep Study History: No sleep study on file. Previous sleep study at [facility].     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 N  Tonsillectomy? N  Have you lost or gained weight recently? Gained- 10 lbs       PARASOMNIAS/ NARCOLEPSY:  Hypnogogic/Hynopompic Hallucinations: N   Sleep paralysis: N  Cataplexy: N   REM behavior symptoms: N  Nightmare: N   Sleep Walking: N  Sleep Talking: N  RLS Symptoms: N           PMH:  Past Medical History:   Diagnosis Date    Atrial fibrillation (HCC)     Obesity     Prolonged emergence from general anesthesia     SENSITIVE TO MEDS, TAKES LESS TO PUT HER UNDER, DIFFICULTY WAKING, STOPS BREATHING PER PATIENT    Thyroid disease         PSH:  Past Surgical History:   Procedure Laterality Date    ABDOMEN SURGERY       SECTION      x2    CHOLECYSTECTOMY      COLONOSCOPY N/A 2020    COLORECTAL CANCER SCREENING, HIGH RISK performed by Darby Hicks MD at [address]      UPPER GASTROINTESTINAL ENDOSCOPY N/A 2020    EGD BIOPSY performed by Darby Hicks MD at [address] [facility] [address] Hx:  Social History     Tobacco Use    Smoking status: Never Smoker    Smokeless tobacco: Never Used   Vaping Use    Vaping Use: Never used   Substance Use Topics    Alcohol use: No    Drug use: No        Fam Hx:  History reviewed. No pertinent family history. Current Outpatient Medications   Medication Sig Dispense Refill    busPIRone (BUSPAR) 5 MG tablet Take 1 tablet by mouth 3 times daily 90 tablet 0    dilTIAZem (CARDIZEM) 30 MG tablet Take 1 tablet by mouth 4 times daily 120 tablet 0    apixaban (ELIQUIS) 5 MG TABS tablet Take 5 mg by mouth 2 times daily       TIROSINT 112 MCG CAPS Take 1 capsule daily 30 capsule 5    LORazepam (ATIVAN) 0.5 MG tablet lorazepam 0.5 mg tablet (Patient not taking: Reported on 3/31/2022)       No current facility-administered medications for this visit. Review of Systems  (Sleep ROS above)     Constitutional: no chills, no fever   Eyes:  Mild Blurry vision in right eye (chronic)  ENT: no hoarseness and no sore throat. Cardiovascular: no chest pain, no lower extremity edema. Respiratory: no cough, no shortness of breath   Gastrointestinal:  no nausea,  no vomiting, no diarrhea. Musculoskeletal: no back pain and no myalgias. + joint pain (chronic)  Integumentary: no rashes or lacerations. Neurological:  no diplopia, no dizziness,  no headache, no memory changes,  and no tingling. Endocrine: No changes in appetite, no feelings of weakness      Objective:   Physical Exam  BP (!) 141/84 (Site: Left Upper Arm, Position: Sitting, Cuff Size: Large Adult)   Pulse 75   Resp 20   Ht 5' 4\" (1.626 m)   Wt 268 lb (121.6 kg)   SpO2 98%   BMI 46.00 kg/m²             General: No acute distress. BMI of Body mass index is 46 kg/m². HEENT: Normocephalic, atraumatic. No oropharyngeal lesions. Mallampati class- 3  Tonsils- 1     Neck: Trachea midline  Lungs: Clear to auscultation bilaterally. No wheezes or crackles  Cardiac: Regular rate and rhythm. No murmurs appreciated. Neurologic: Normal gait. Balance intact. Psychiatric: Alert and oriented. Appropriate affect. Denies SI/HI  Extremities: No clubbing or no peripheral edema  Skin: No lesions or rashes  Hematologic/lymphatic/immunologic: No bruises or bleeding    Sleep Medicine 3/31/2022   Sitting and reading 2   Watching TV 2   Sitting, inactive in a public place (e.g. a theatre or a meeting) 1   As a passenger in a car for an hour without a break 0   Lying down to rest in the afternoon when circumstances permit 2   Sitting and talking to someone 0   Sitting quietly after a lunch without alcohol 3   In a car, while stopped for a few minutes in traffic 0   Total score 10         SLEEP STUDY HISTORY      No sleep study on file. Previous sleep study at [facility].     PERTINENT LAB RESULTS  No results found for: FERRITIN       Assessment:      Martha was seen today for sleep apnea. Diagnoses and all orders for this visit:    CLINTON (obstructive sleep apnea)  -     Covid-19 Ambulatory; Future  -     Baseline Diagnostic Sleep Study; Future    Obesity, unspecified classification, unspecified obesity type, unspecified whether serious comorbidity present  -     Ambulatory Referral to Bariatric Surgery    Insomnia, unspecified type  -     Amb External Referral To Psychology    Anxiety  -     External Referral To Psychology    Other orders  -     Cancel: External Referral To Psychology    ·    Plan:      1. Suspected CLINTON. New sleep study is requested to restart Pap therapy after previous history of noncompliance. Patient was offered in lab sleep study versus home sleep study. Patient elected to proceed with in lab sleep study.  - Sleep study ordered to assess for sleep disordered breathing  -COVID sleep lab testing requirements were reviewed with the patient. Education and precautions were provided to the patient    2. Insomnia  -Difficulty both falling and staying asleep  -She reports anxiety related to sleeping, due to history of choking/difficulty breathing at night.  -Referral to sleep psychology  -Education provided and included in the patient's handout. 3.  Anxiety   -Likely contributing to symptoms of insomnia  - Denies SI/HI  -Patient was previously told to establish with general psychology to help with her symptoms.  -Referral was placed should the patient need it. She will call in with perferred provider. Patient was given some local suggestions. 4. Obesity. Body mass index is 46 kg/m². .  - Gastric bypass in 2005  -Referral to medical weight loss with Dr. Ajith Mcelroy.  -Healthy Weight loss advised    Patient will follow up Return in about 3 months (around 6/30/2022) for Follow up after sleep study.     [facility] [address] -[phone] option 2  F- [phone]

## 2022-03-31 NOTE — PATIENT INSTRUCTIONS
It was a pleasure seeing you today Rosado Kiera! Summary of Today's Visit        - Please call the office when you find a psychologist you would like to follow with. Some local suggestions are:  Dr. Rui Hawkins (tejal ? ??)  Dr. Karlene Melendez    - You have been ordered an In-Lab Sleep Study. The Sleep lab should call you to schedule your sleep study. The sleep lab number is listed below for your reference. [facility] Number  [phone]      If COVID Testing is required by the Sleep Lab:  As of February 2021    [facility]: [address] [place] [address], [place], [address] [place] [address]; Hours:  Monday - Friday 6am to 2:30pm  [facility]: [address] [place] [address], [facility]; Hours: Monday - Friday  6am to 2:30pm  [address]: [address] [facility], [address] [place] [address]; Hours: Monday  - Friday 6am to 2:30pm ([address] [place] [address])        -Please do not drive when you are sleepy   -Please sign a request of records consent form, so that we may receive all of your previous sleep study reports and clinical notes, if they were not available today. - Please schedule a 2 month follow up today, to go over results        It is always advised that you check with your insurance company to determine how your study will be covered. For general questions or scheduling issues, call our [address] [place], [place] [phone] option 2  f- [phone]           The general categories for the treatment of Sleep Apnea (including benefits and potential side effects) include:     1. Supportive Care  -Elevate the head of the bed   -Avoid sleeping on your back      2. Weight loss  -Start a healthy weight loss program  -Consider a referral to Weight Loss Medicine Clinic  -Benefits: Multiple health benefits  -Side Effects: Depends on treatment type     3.  Oral Appliance \"Mouth Piece\"  (Mandibular Advancement Device)  -Benefits: CPAP alternative. Decreased snoring, improved sleep quality, decreased sleepiness.  -Side Effects: teeth crowding, oral/dental pain. Regular visits with dentist are advised to watch for side effects. 4. Positive pressure therapy with a machine and a mask (CPAP or BiPAP)  Benefits: Decreased snoring, improved sleep quality, decreased sleepiness. Risks: Aerophagia (swallowing air), dry nose/nose bleeds, dry eyes (due to leak), skin sores/rash (due to mask)     5. Different kinds of surgeries, including nasal surgery, surgery of the throat/windpipe, and nerve stimulation therapy (INSPIRE). Benefits: CPAP alternative. Decreased snoring, improved sleep quality, decreased sleepiness. Risks: Bleeding, infection, nerve damage         Please note that complications of CLINTON ,if present, and not treated can increase risk for: systemic hypertension , pulmonary hypertension (high blood pressure in the lungs), cardiovascular morbidities (heart attack and stroke), car accidents and all cause mortality (increased risk of death). Treatment for sleep apnea, if present, can reduce these risks. Helpful Web Sites: For patients with ALL SLEEP DISORDERS: American Academy of Sleep Medicine http://sleepeducation. org; or Medical Breakthroughs Fund: https://sleepfoundation. org  For patients with CLINTON: American Sleep Apnea Association: http://boyer.org/. org  For patients with RLS: RLS Foundation: Nae.  For patients with INSOMNIA: https://www.dorantes.org/. org/articles/sleep/insomnia-causes-and-cures. htm  For patients with DEPRESSION: SinDelantal.Mx.com.[employer]. com/depression         Sleep Medicine Terms     CPAP - Continuous Positive Airway Pressure - 1 set pressure (i.e. 7 cwp)  APAP - Automatic Positive Airway Pressure - PAP device determines in real time what pressure will work best confined to certain settings. (I.e. 4-15 cwp)  BiPAP - Bilevel Positive Airway Pressure - Higher pressure on taking a breath and lower pressure upon breathing out (i.e. 10/6 cwp)  CWP - Centimeters of water pressure   [facility] - [facility] who sends you your CPAP/APAP/BiPAP and supplies. PSG - Polysomnogram - Overnight Sleep study  Titration Study - Overnight sleep study with the PAP device tired at different settings  Split Night study - PSG and titration study            Sleep Studies: About This Test  What is it? Sleep studies are tests that watch what happens to your body during sleep. These studies usually are done in a sleep lab. Sleep labs are often located in hospitals. Sleep studies you do at home can be done with portable equipment. But they may not give the same results as a sleep lab. Why is this test done? Sleep studies are done for people who say that sleep isn't restful or that they are tired all day. These studies can help find sleep problems, such as:  · Sleep apnea. This means that an adult regularly stops breathing during sleep for 10 seconds or longer. · Excessive snoring. · Problems staying awake, such as narcolepsy. · Problems with nighttime behaviors. These include sleepwalking, night terrors, bed-wetting, and REM behavior disorders (RBD). · Conditions such as periodic limb movement disorder. This is repeated muscle twitching of the feet, arms, or legs during sleep. · Seizures that occur at night (nocturnal seizures). How do you prepare for the test?  · You may be asked to keep a sleep diary for 1 to 2 weeks before your sleep study. · Don't take any naps for 2 to 3 days before your test.  · You may be asked to avoid food or drinks with caffeine for a day or two before your test.  · Take a shower or bath before your test, but don't use sprays, oils, or gels on your hair. Don't wear makeup, fingernail polish, or fake nails.   · Pack and take along a small overnight bag with personal items, such as a toothbrush, a comb, favorite pillows or blankets, and a book. Mariama Astorga can wear your own nightclothes. · If you will have portable sleep monitoring, your doctor will explain how to use the equipment at home. How is the test done? · In the sleep lab, you will be in a private room, much like a hotel room. · Small pads or patches called electrodes will be placed on your head and body with a small amount of glue and tape. These will record things like brain activity, eye movement, oxygen levels, and snoring. · Soft elastic belts will be placed around your chest and belly to measure your breathing. · Your blood oxygen levels will be checked by a small clip (oximeter) placed either on the tip of your index finger or on your earlobe. · If you have sleep apnea, you may wear a mask that is connected to a continuous positive airway pressure (CPAP) machine. · Depending on the type of test, you will be allowed to sleep through the night or you'll be awakened periodically and asked to stay awake for a while. · If you use portable sleep monitoring, follow the instructions your doctor gave you. How long does the test take? · You will stay in the sleep lab overnight. For some tests, you will also stay part of the next day. What happens after the test?  · You will be able to go home right away. · You may not sleep well during the test and may be tired the next day. · You can go back to your usual activities right away. · After your sleep problem has been identified, you may need a second study if your doctor orders treatment such as CPAP. Follow-up care is a key part of your treatment and safety. Be sure to make and go to all appointments, and call your doctor if you are having problems. It's also a good idea to keep a list of the medicines you take. Ask your doctor when you can expect to have your test results. Where can you learn more? Go to https://chheydieb.ClickFacts. org and sign in to your Placemeter account.  Enter J459 in the [employer] Interactive box to learn more about \"Sleep Studies: About This Test.\"     If you do not have an account, please click on the \"Sign Up Now\" link. Current as of: October 26, 2020               Content Version: 12.9  © 2006-2021 Healthwise, [employer]. Care instructions adapted under license by [facility] ([facility]). If you have questions about a medical condition or this instruction, always ask your healthcare professional. [address] any warranty or liability for your use of this information. Sleep Hygiene Guidelines  Good dental hygiene is important in determining the health of your teeth and gums. Similarly, good sleep hygiene is important in determining the quality and quantity of your sleep. Review the below guidelines and check the ones you think you might be breaking. 1. Screen time: Turn off TV, computers, tablets, and smart phones 1 hour Before Bedtime  The short waves of blue light (emitted from the screens of TVs, laptops, iPads, smart phones, etc.) mimic daylight. Thinking its daytime, your brain suppresses melatonin and becomes more alert because we have evolved to see this type of light only during the day. Whats more, the overall stimulation we get from these devices serves to keep us more alert. If TV is your relaxing activity, try to move it up a bit earlier in the evening. The TV should be in a separate room - NOT your bedroom. 2. Caffeine: Avoid Caffeine 6-8 Hours Before Bedtime   Caffeine disturbs sleep, even in people who dont think they experience a stimulation effect.  Individuals with insomnia are often more sensitive to mild stimulants than are normal sleepers.  Caffeine is found in items such as coffee, tea, soda, chocolate, and many over-the-counter medications (e.g., Excedrin).  Caffeine should be avoided in the afternoon and evening, preferably taken no later than 1pm. You might consider a trial period of no caffeine at all.      3. Nicotine: Avoid Nicotine Before Bedtime   Although some smokers claim that smoking helps them relax, nicotine is a stimulant.  The initial relaxing effects occur with the initial entry of the nicotine, but as the nicotine builds in the body, it produces an effect similar to caffeine.  Nicotine should be avoided near bedtime and during the night. Dont smoke to get yourself back to sleep. 4. Alcohol: Avoid Alcohol After Dinner   Alcohol often promotes the onset of sleep, but as alcohol is metabolized during the night, sleep becomes disturbed and fragmented, leading to poor sleep quality.  Limit alcohol use to 1 beer or glass of wine per day for women, 2  drinks per day for men. 5. Sleeping Pills: Sleep Medications are Effective Only Temporarily   Research has shown that sleep medications lose their effectiveness in about 2 - 4 weeks when taken regularly.  Over time, sleeping pills actually can make sleep problems worse. When sleeping pills have been used over a long period, withdrawal from the medication can lead to an insomnia rebound. Thus, after long- term use, many individuals incorrectly conclude that they need sleeping pills in order to sleep normally.  Keep use of sleep meds infrequent, but dont worry if you need to use one on an occasional basis. (And always consult with your doctor first if you decide to make changes to your medication regimen.)     6. Regular Exercise   Exercise has been shown to aid sleep, although the positive effect often takes several weeks to become noticeable.  However, exercise earlier in the day if possible. Exercise within 2 hours of bedtime may elevate nervous system activity and interfere with sleep onset.  Get regular exercise, preferably at least 20 minutes each day of an activity that causes sweating. 7. Hot Baths   Spending 20 minutes in a tub of hot water an hour or two prior to bedtime may promote sleep.      8.Bedroom Environment: Moderate Temperature, Quiet, and Dark   Extremes of heat or cold can disrupt sleep. Keep your room at a comfortable temperature.  Noises can be masked with background white noise (such as the noise of a fan) or with earplugs.  Bedrooms may be darkened with black-out shades or sleep masks can be worn.  Position clocks out-of-sight since clock-watching can increase worry about the effects of lack of sleep. 9. Eating   A light bedtime snack, such a glass of warm milk, cheese, or a bowl of cereal can promote sleep.  Avoid heavy or spicy meals before bedtime and any caffeinated foods (e.g., chocolate).  Avoid snacks in the middle of the night since awakening may become associated with hunger.  Do not go to bed too hungry or too full. 10. Avoid/Reduce Naps   The sleep you obtain during the day takes away from your sleep need at night - resulting in lighter, more restless sleep, difficulty falling asleep, and/or early morning awakening.  Avoid naps. If you must nap, keep it brief. It is best to set an alarm to ensure you dont sleep more than 15 minutes. 11. Regular Sleep Schedule   Keep a regular time each day, 7 days a week, to get out of bed. Keeping a regular awakening time helps set your circadian rhythm so that your body learns to sleep at the desired time.

## 2022-04-01 ENCOUNTER — TELEPHONE (OUTPATIENT)
Dept: SLEEP CENTER | Age: 52
End: 2022-04-01

## 2022-04-29 ENCOUNTER — TELEPHONE (OUTPATIENT)
Dept: ENDOCRINOLOGY | Age: 52
End: 2022-04-29

## 2022-04-29 NOTE — TELEPHONE ENCOUNTER
Pt is having heart issues. Going into afib & not feeling well. Pt really needs a call back as she is really upset and worried.

## 2022-05-02 DIAGNOSIS — E03.9 HYPOTHYROIDISM, UNSPECIFIED TYPE: ICD-10-CM

## 2022-05-02 RX ORDER — LEVOTHYROXINE SODIUM 112 UG/1
CAPSULE ORAL
Qty: 32 CAPSULE | Refills: 5 | Status: SHIPPED
Start: 2022-05-02 | End: 2022-05-03 | Stop reason: SDUPTHER

## 2022-05-02 NOTE — TELEPHONE ENCOUNTER
Also if patient is taking tirosint 112 mcg daily please have patient adjust dose to 1 tablet Monday through Saturday, 1.5 tablets on Sunday

## 2022-05-02 NOTE — TELEPHONE ENCOUNTER
Again A. fib or arrhythmia is not related to her thyroid  She is not overtreated on thyroid medication  Her TSH is elevated which means she needs a little bit more thyroid hormone  Please clarify dose of thyroid hormone patient is currently taking

## 2022-05-02 NOTE — TELEPHONE ENCOUNTER
Her issue s not from the thyroid.  Her thyroid level is low NOT high and this shouldn't cause Afib or arrhythmia

## 2022-05-02 NOTE — TELEPHONE ENCOUNTER
Pt is very concerned  because her levels are 11.6 and she states her heart does this when she is low and high. She wants to know how much thyroid medication she should be taking.

## 2022-05-03 DIAGNOSIS — E03.9 HYPOTHYROIDISM, UNSPECIFIED TYPE: ICD-10-CM

## 2022-05-03 RX ORDER — LEVOTHYROXINE SODIUM 112 UG/1
CAPSULE ORAL
Qty: 34 CAPSULE | Refills: 5 | Status: SHIPPED
Start: 2022-05-03 | End: 2022-06-02 | Stop reason: SDUPTHER

## 2022-05-03 NOTE — PROGRESS NOTES
[address] [place] [address] Department of Endocrinology Diabetes and Metabolism   [address] [facility] [address]   Phone: [phone]  Fax: [phone]    Date of Service: 5/3/2022    Primary Care Physician: Shiraz Hassan DO  Referring physician: No ref. provider found  Provider: ROSS Tompkins     Reason for the visit:      History of Present Illness: The history is provided by the patient. No  was used. Accuracy of the patient data is excellent. Enmanuel Werner is a very pleasant 46 y.o. female seen today for diabetes management     Enmanuel Werner was diagnosed with diabetes at age   and currently on   The patient has been checking blood sugar    .     Most recent A1c results summarized below  Lab Results   Component Value Date    LABA1C 4.8 07/06/2021     Patient reported hypoglycemic episodes  Patient has had no hypoglycemic episodes   The patient has been mindful of what has been eating and following diabetic diet as encouraged  The patient hasn't been mindful of what has been eating and wasn't following diabetes diet    I reviewed current medications and the patient has no issues with diabetes medications  Enmanuel Werner is up to date with eye exam and denied any history of diabetic retinopathy   The patient is due for an eye exam. Last eye exam was   , no h/o diabetic retinopathy  The patient seeing podiatrist every   And also performs  own feet care  Microvascular complications:  No Retinopathy, Nephropathy or Neuropathy   Macrovascular complications: no CAD, PVD, or Stroke  The patient receives Flushot every year and up to date with the Pneumonia vaccine   The patient refuses Flushot and pneumonia vaccine     PAST MEDICAL HISTORY   Past Medical History:   Diagnosis Date    Atrial fibrillation ([address]     Obesity     Prolonged emergence from general anesthesia     SENSITIVE TO MEDS, TAKES LESS TO PUT HER UNDER, DIFFICULTY WAKING, STOPS BREATHING PER PATIENT    Thyroid disease        PAST SURGICAL HISTORY   Past Surgical History:   Procedure Laterality Date    ABDOMEN SURGERY       SECTION      x2    CHOLECYSTECTOMY      COLONOSCOPY N/A 2020    COLORECTAL CANCER SCREENING, HIGH RISK performed by [facility]son [facility] at [facility]      TUBAL LIGATION      UPPER GASTROINTESTINAL ENDOSCOPY N/A 2020    EGD BIOPSY performed by Mary Paulson MD at [facility] [address]   Tobacco:   reports that she has never smoked. She has never used smokeless tobacco.  Alcohol:   reports no history of alcohol use. Drugs:   reports no history of drug use. FAMILY HISTORY   No family history on file. ALLERGIES AND DRUG REACTIONS   Allergies   Allergen Reactions    Avelox [Moxifloxacin Hcl In Nacl]     Cephalexin     Ciprofloxacin     Clindamycin     Coffee Bean Extract [Coffea Arabica]     Magnesium Sulfate     Milk-Related Compounds     Penicillins     Percocet [Oxycodone-Acetaminophen]     Sulfa Antibiotics        CURRENT MEDICATIONS   Current Outpatient Medications   Medication Sig Dispense Refill    TIROSINT 112 MCG CAPS Take 1 tablet Monday through Saturday, 1.5 tablets on  32 capsule 5    busPIRone (BUSPAR) 5 MG tablet Take 1 tablet by mouth 3 times daily 90 tablet 0    dilTIAZem (CARDIZEM) 30 MG tablet Take 1 tablet by mouth 4 times daily 120 tablet 0    apixaban (ELIQUIS) 5 MG TABS tablet Take 5 mg by mouth 2 times daily       LORazepam (ATIVAN) 0.5 MG tablet lorazepam 0.5 mg tablet (Patient not taking: Reported on 3/31/2022)       No current facility-administered medications for this visit. Review of Systems  Constitutional: No fever, no chills, no diaphoresis, no generalized weakness.   HEENT: No blurred vision, No sore throat, no ear pain, no hair loss  Neck: denied any neck swelling, difficulty swallowing,   Cardio-pulmonary: No CP, SOB or palpitation, No orthopnea or PND. No cough or wheezing. GI: No N/V/D, no constipation, No abdominal pain, no melena or hematochezia   : Denied any dysuria, hematuria, flank pain, discharge, or incontinence. Skin: denied any rash, ulcer, Hirsute, or hyperpigmentation. MSK: denied any joint deformity, joint pain/swelling, muscle pain, or back pain. Neuro: no numbness, no tingling, no weakness, _    OBJECTIVE    There were no vitals taken for this visit. BP Readings from Last 4 Encounters:   03/31/22 (!) 141/84   03/23/22 135/88   10/26/21 128/84   10/07/21 126/85     Wt Readings from Last 6 Encounters:   03/31/22 268 lb (121.6 kg)   03/23/22 253 lb 6.4 oz (114.9 kg)   10/26/21 261 lb 12.8 oz (118.8 kg)   10/07/21 259 lb (117.5 kg)   07/05/21 250 lb (113.4 kg)   06/09/21 250 lb 3.2 oz (113.5 kg)       Physical examination:  General: awake alert, oriented x3, no abnormal position or movements. HEENT: normocephalic non-traumatic, no exophthalmos   Neck: supple, no LN enlargement, no thyromegaly, no thyroid tenderness, no JVD. Pulm: Clear equal air entry no added sounds, no wheezing or rhonchi    CVS: S1 + S2, no murmur, no heave. Dorsalis pedis pulse palpable   Abd: soft lax, no tenderness, no organomegaly, audible bowel sounds. Skin: warm, no lesions, no rash.  No callus, no Ulcers, No acanthosis nigricans  Musculoskeletal: No back tenderness, no kyphosis/scoliosis    Neuro: CN intact, Monofilament sensation decreased bilateral , muscle power normal  Psych: normal mood, and affect      Review of Laboratory Data:  I personally reviewed the following lab:  Lab Results   Component Value Date/Time    WBC 6.6 03/21/2022 08:37 AM    RBC 4.43 03/21/2022 08:37 AM    HGB 13.1 03/21/2022 08:37 AM    HCT 41.8 03/21/2022 08:37 AM    MCV 94.4 03/21/2022 08:37 AM    MCH 29.6 03/21/2022 08:37 AM    MCHC 31.3 (L) 03/21/2022 08:37 AM    RDW 14.6 03/21/2022 08:37 AM     03/21/2022 08:37 AM    MPV 10.7 03/21/2022 08:37 AM      Lab Results   Component Value Date/Time     03/23/2022 06:57 AM    K 4.1 03/23/2022 06:57 AM    K 3.8 03/21/2022 08:37 AM    CO2 27 03/23/2022 06:57 AM    BUN 10 03/23/2022 06:57 AM    CREATININE 0.6 03/23/2022 06:57 AM    CALCIUM 8.6 03/23/2022 06:57 AM    LABGLOM >60 03/23/2022 06:57 AM    GFRAA >60 03/23/2022 06:57 AM      Lab Results   Component Value Date/Time    TSH 2.990 03/21/2022 10:58 AM    T4FREE 1.16 03/03/2022 11:27 AM    X5RCZWE 7.1 10/22/2021 12:00 AM    W5LLKFN 82.29 06/06/2021 02:02 AM    TSI <0.10 10/03/2020 08:12 AM    TPOABS 2.6 10/03/2020 08:12 AM    THGAB <0.9 10/03/2020 08:12 AM     Lab Results   Component Value Date    LABA1C 4.8 07/06/2021    GLUCOSE 83 03/23/2022    GLUCOSE 88 12/08/2010     Lab Results   Component Value Date    LABA1C 4.8 07/06/2021     Lab Results   Component Value Date    TRIG 95 07/06/2021    HDL 77 07/06/2021    LDLCALC 122 07/06/2021    CHOL 218 07/06/2021     Lab Results   Component Value Date    VITD25 21 07/06/2021    VITD25 32 05/28/2021       ASSESSMENT & RECOMMENDATIONS   Martha Mancini, a 46 y.o.-old female seen in for the following issues       Assessment:   {No diagnosis found. (Refresh or delete this SmartLink)}    Plan:     No diagnosis found. Diabetes Mellitus Type     · Patient's diabetes is uncontrol   · Will change DM regimen to   · The patient was advised to check blood sugars 4 times a day before meals and at bedtime and send BS readings to our office in a week.   · Discussed with patient A1c and blood sugar goals   · Optimal blood sugars: 100-140 pre-prandial, < 180 peak post-prandial  · The patient counseled about the complications of uncontrolled diabetes   · Patient was counselled about the importance of self-blood glucose monitoring and eating consistent carb diet to avoid blood sugar fluctuations   · Patient will need routine diabetes maintenance and prevention  · The patient was referred to diabetic educator for further teaching   · Discussed lifestyle changes including diet and exercise with patient; recommended 150 minutes of moderate intensity exercise per week. · Diabetes labs before next visit     Dietary noncompliance   Discussed with patient the importance of eating consistent carbohydrate meals, avoiding high glycemic index food. Also, discussed with patient the risk and negative consequences of dietary noncompliance on blood glucose control, blood pressure and weight      Obesity   Discussed lifestyle changes including diet and exercise with patient in depth. Also discussed with patient cardiovascular risk associated with obesity    I personally reviewed external notes from PCP and other patient's care team providers, and personally interpreted labs associated with the above diagnosis. I also ordered labs to further assess and manage the above addressed medical conditions. No follow-ups on file. The above issues were reviewed with the patient who understood and agreed with the plan. Thank you for allowing us to participate in the care of this patient. Please do not hesitate to contact us with any additional questions. ROSS Graham     [facility] - BE[facility]AV[facility] Diabetes Care and Endocrinology   [address] [facility] [address]   Phone: [phone]  Fax: [phone]  --------------------------------------------  An electronic signature was used to authenticate this note.  ROSS Graham on 5/3/2022 at 9:59 AM

## 2022-05-09 ENCOUNTER — TELEMEDICINE (OUTPATIENT)
Dept: ENDOCRINOLOGY | Age: 52
End: 2022-05-09
Payer: COMMERCIAL

## 2022-05-09 DIAGNOSIS — E16.1 REACTIVE HYPOGLYCEMIA: ICD-10-CM

## 2022-05-09 DIAGNOSIS — E23.7 PITUITARY DISORDER (HCC): ICD-10-CM

## 2022-05-09 DIAGNOSIS — E03.9 HYPOTHYROIDISM, UNSPECIFIED TYPE: Primary | ICD-10-CM

## 2022-05-09 DIAGNOSIS — E16.2 HYPOGLYCEMIA: ICD-10-CM

## 2022-05-09 DIAGNOSIS — E04.2 MULTINODULAR GOITER: ICD-10-CM

## 2022-05-09 DIAGNOSIS — Z98.84 HX OF GASTRIC BYPASS: ICD-10-CM

## 2022-05-09 PROCEDURE — 99214 OFFICE O/P EST MOD 30 MIN: CPT | Performed by: INTERNAL MEDICINE

## 2022-05-09 NOTE — PROGRESS NOTES
Samy Bolden was read the following message We want to confirm that, for purposes of billing, this is a virtual visit with your provider for which we will submit a claim for reimbursement with your insurance company. You will be responsible for any copays, coinsurance amounts or other amounts not covered by your insurance company. If you do not accept this, unfortunately we will not be able to schedule a virtual visit with the provider. Do you accept?  Martha responded YES

## 2022-05-09 NOTE — PROGRESS NOTES
[address] [place] [address] Department of Endocrinology Diabetes and Metabolism   [address] [facility] [address]   Phone: [phone]  Fax: [phone]    Date of Service: 5/9/2022  Primary Care Physician: Mya Cohen DO  Provider: Yokasta Ferrera MD        Reason for the visit:  Primary Hypothyroidism    History of Present Illness: The history is provided by the patient. No  was used. Accuracy of the patient data is excellent. James Officer is a very pleasant 46 y.o. female seen today for follow up visit   The patent was diagnosed with hypothyroidism in her early 19's. She is currently on Tirosint 112 mcg 5 days a week and 100 mcg 2 days a wk. Patient takes Tirosint in the morning at empty stomach, wait one hour before eating , avoid multivitamins containing calcium  or iron with it. Pt has h/o gastric bypass surgery 16 years ago and since then she has been struggling to maintain normal  thyroid level     Pt currently on Tirosin 112 mcg 1 tab 6 days a week and 2 cap on Sunday     Lab Results   Component Value Date/Time    TSH 2.990 03/21/2022 10:58 AM    T4FREE 1.16 03/03/2022 11:27 AM    K0HTHAX 7.1 10/22/2021 12:00 AM    I7RCBCE 82.29 06/06/2021 02:02 AM    TSI <0.10 10/03/2020 08:12 AM    TPOABS 2.6 10/03/2020 08:12 AM    THGAB <0.9 10/03/2020 08:12 AM       MNG   Thyroid US 10/2019   Atrophic heterogenous  thyroid gland   Rt lobe measures 2.6 x 0.7 x 0.7 cm, Lt lobe measures 2.1 x \0.8 x 0.5 cm, isthmus measures 0.1 cm   Non specific calcification in interpolar region measures 0.2 cm      Thyroid US 12/2020 --> tiny 2 and 2 mm thyroid nodules     Martha ROSEMARY Lela Herrera reported some discomfort at thyroid level but denies any voice change, or shortness of breath. No family history of thyroid cancer. No prior history of radiation to head or neck region.       PAST MEDICAL HISTORY   Past Medical History:   Diagnosis Date    Atrial fibrillation ([address]     Obesity     Prolonged emergence from general anesthesia     SENSITIVE TO MEDS, TAKES LESS TO PUT HER UNDER, DIFFICULTY WAKING, STOPS BREATHING PER PATIENT    Thyroid disease        PAST SURGICAL HISTORY   Past Surgical History:   Procedure Laterality Date    ABDOMEN SURGERY       SECTION      x2    CHOLECYSTECTOMY      COLONOSCOPY N/A 2020    COLORECTAL CANCER SCREENING, HIGH RISK performed by Sherif [facility]aved [facility] at [facility]      TUBAL LIGATION      UPPER GASTROINTESTINAL ENDOSCOPY N/A 2020    EGD BIOPSY performed by Sherif Javed MD at [facility] [address]   Tobacco:   reports that she has never smoked. She has never used smokeless tobacco.  Alcohol:   reports no history of alcohol use. Drugs:   reports no history of drug use. FAMILY HISTORY   No family history on file. ALLERGIES AND DRUG REACTIONS   Allergies   Allergen Reactions    Avelox [Moxifloxacin Hcl In Nacl]     Cephalexin     Ciprofloxacin     Clindamycin     Coffee Bean Extract [Coffea Arabica]     Magnesium Sulfate     Milk-Related Compounds     Penicillins     Percocet [Oxycodone-Acetaminophen]     Sulfa Antibiotics        CURRENT MEDICATIONS   Current Outpatient Medications   Medication Sig Dispense Refill    TIROSINT 112 MCG CAPS Take 1 tablet Monday through Saturday, 2 tablets on  34 capsule 5    busPIRone (BUSPAR) 5 MG tablet Take 1 tablet by mouth 3 times daily 90 tablet 0    dilTIAZem (CARDIZEM) 30 MG tablet Take 1 tablet by mouth 4 times daily 120 tablet 0    apixaban (ELIQUIS) 5 MG TABS tablet Take 5 mg by mouth 2 times daily       LORazepam (ATIVAN) 0.5 MG tablet lorazepam 0.5 mg tablet       No current facility-administered medications for this visit. Review of Systems  Constitutional: No fever, no chills, no diaphoresis, no generalized weakness.   HEENT: No blurred vision, No sore throat, no ear pain, no hair loss  Neck: denied any neck swelling, difficulty swallowing,   Cadrdiopulomary: No CP, SOB or palpitation, No orthopnea or PND. No cough or wheezing. GI: No N/V/D, no constipation, No abdominal pain, no melena or hematochezia   : Denied any dysuria, hematuria, flank pain, discharge, or incontinence. Skin: denied any rash, ulcer, Hirsute, or hyperpigmentation. MSK: denied any joint deformity, joint pain/swelling, muscle pain, or back pain. Neuro: no numbess, no tingling, no weakness,     OBJECTIVE    There were no vitals taken for this visit. BP Readings from Last 4 Encounters:   03/31/22 (!) 141/84   03/23/22 135/88   10/26/21 128/84   10/07/21 126/85     Wt Readings from Last 6 Encounters:   03/31/22 268 lb (121.6 kg)   03/23/22 253 lb 6.4 oz (114.9 kg)   10/26/21 261 lb 12.8 oz (118.8 kg)   10/07/21 259 lb (117.5 kg)   07/05/21 250 lb (113.4 kg)   06/09/21 250 lb 3.2 oz (113.5 kg)       Physical examination:  General: awake alert, oriented x3, no abnormal position or movements. HEENT: normocephalic non traumatic  Neck: supple, no LN enlargement, no thyroid tenderness, no JVD. Pulm: Clear equal air entry no added sounds, no wheezing or rhonchi    CVS: S1 + S2, no murmur, no heave. Dorsalis pedis pulse palpable   Abd: soft lax, no tenderness, no organomegaly, audible bowel sounds. Skin: warm, no lesions, no rash.   Musculoskeletal: No back tenderness, no kyphosis/scoliosis   Neuro: CN intact, sensation normal , muscle power normal.  Psych: normal mood, and affect    Review of Laboratory Data:  I have reviewed the following:  Lab Results   Component Value Date/Time    WBC 6.6 03/21/2022 08:37 AM    RBC 4.43 03/21/2022 08:37 AM    HGB 13.1 03/21/2022 08:37 AM    HCT 41.8 03/21/2022 08:37 AM    MCV 94.4 03/21/2022 08:37 AM    MCH 29.6 03/21/2022 08:37 AM    MCHC 31.3 (L) 03/21/2022 08:37 AM    RDW 14.6 03/21/2022 08:37 AM     03/21/2022 08:37 AM    MPV 10.7 03/21/2022 08:37 AM      Lab Results Component Value Date/Time     03/23/2022 06:57 AM    K 4.1 03/23/2022 06:57 AM    K 3.8 03/21/2022 08:37 AM    CO2 27 03/23/2022 06:57 AM    BUN 10 03/23/2022 06:57 AM    CREATININE 0.6 03/23/2022 06:57 AM    CALCIUM 8.6 03/23/2022 06:57 AM    LABGLOM >60 03/23/2022 06:57 AM    GFRAA >60 03/23/2022 06:57 AM      Lab Results   Component Value Date/Time    TSH 2.990 03/21/2022 10:58 AM    T4FREE 1.16 03/03/2022 11:27 AM    Z3DGLHW 7.1 10/22/2021 12:00 AM    Q0VEMHG 82.29 06/06/2021 02:02 AM    TSI <0.10 10/03/2020 08:12 AM    TPOABS 2.6 10/03/2020 08:12 AM    THGAB <0.9 10/03/2020 08:12 AM     Lab Results   Component Value Date    LABA1C 4.8 07/06/2021    GLUCOSE 83 03/23/2022    GLUCOSE 88 12/08/2010     Lab Results   Component Value Date    TRIG 95 07/06/2021    HDL 77 07/06/2021    LDLCALC 122 07/06/2021    CHOL 218 07/06/2021     Lab Results   Component Value Date    VITD25 21 07/06/2021    VITD25 32 05/28/2021     ASSESSMENT & RECOMMENDATIONS   Martha Goldsteins, a 46 y.o.-old female seen in for following issues     Primary hypothyroidism   · I had a long discussion with pt and encouraged he take Tirosin every morning at empty stomach, wait one hour before eating , avoid multivitamins containing calcium  or iron with it. · Continue Tirosint 112 mcg 6 days a week and 2 cap on Sunday   · The had gastric bypass surgery about 16 years ago and since then she has been struggling to maintain normal  thyroid level     VitD deficiency  · Continue vitD supplement      MNG   · I have reviewed previous thyroid US images myself (10/2019, 12/2020). Thyroid gland is very heterogenous and atrophic without any discrete nodules. Finding very consistent with chronic thyroiditis.  Non specific tiny calcification seen in interpolar region measures 0.2 cm and nodule measuring 3 mm    · Continue monitoring with periodic US     I personally reviewed external notes from PCP and other patient's care team providers, and personally interpreted labs associated with the above diagnosis. I also ordered labs to further assess and manage the above addressed medical conditions    Return in about 6 months (around 11/9/2022) for hypothyroidism, Multinodular goiter, VitD deficiency. The above issues were reviewed with the patient who understood and agreed with the plan. Thank you for allowing us to participate in the care of this patient. Please do not hesitate to contact us with any additional questions. Diagnosis Orders   1. Hypothyroidism, unspecified type  TSH    T4, Free    T4   2. Hx of gastric bypass     3. Pituitary disorder ([address]     4. Hypoglycemia     5. Reactive hypoglycemia     6. Multinodular goiter         Juleen Dubin MD  Endocrinologist, [facility] - BEHAVIORAL HEALTH SERVICES Diabetes Care and Endocrinology   [address] [facility] [address]   Phone: [phone]  Fax: [phone]  ------------------------------  An electronic signature was used to authenticate this note.  Toan Andrew MD on 5/9/2022 at 10:15 AM

## 2022-05-16 ENCOUNTER — OFFICE VISIT (OUTPATIENT)
Dept: BARIATRICS/WEIGHT MGMT | Age: 52
End: 2022-05-16
Payer: COMMERCIAL

## 2022-05-16 VITALS
BODY MASS INDEX: 46.85 KG/M2 | TEMPERATURE: 97.9 F | HEART RATE: 82 BPM | DIASTOLIC BLOOD PRESSURE: 74 MMHG | WEIGHT: 264.4 LBS | SYSTOLIC BLOOD PRESSURE: 127 MMHG | HEIGHT: 63 IN

## 2022-05-16 DIAGNOSIS — E66.01 CLASS 3 SEVERE OBESITY DUE TO EXCESS CALORIES WITHOUT SERIOUS COMORBIDITY WITH BODY MASS INDEX (BMI) OF 45.0 TO 49.9 IN ADULT (HCC): ICD-10-CM

## 2022-05-16 DIAGNOSIS — G47.33 OSA (OBSTRUCTIVE SLEEP APNEA): Primary | ICD-10-CM

## 2022-05-16 DIAGNOSIS — Z98.84 HX OF GASTRIC BYPASS: ICD-10-CM

## 2022-05-16 PROCEDURE — 99205 OFFICE O/P NEW HI 60 MIN: CPT | Performed by: INTERNAL MEDICINE

## 2022-05-16 PROCEDURE — 99202 OFFICE O/P NEW SF 15 MIN: CPT

## 2022-05-16 PROCEDURE — 99417 PROLNG OP E/M EACH 15 MIN: CPT | Performed by: INTERNAL MEDICINE

## 2022-05-16 NOTE — PATIENT INSTRUCTIONS
Medication -    Begin taking Metformin 500 mg according to the following schedule:   Week 1 - Take one tablet every morning with breakfast   Week 2 - Take one tablet every morning with breakfast and one tablet every evening with dinner   Week 3 - Take two tablets every morning with breakfast and one tablet every evening with dinner          Week 4 - Take two tablets every morning with breakfast and two tablets every evening with dinner      Breakfast -     one high protein shake                            + 20 grams of fiber. Do this by either eating 12 tablespoons of the original, plain Fiber One cereal every day or 4 tablespoons of wheat dextrin powder (Benefiber or a generic brand) every day. Work up to this amount slowly by starting with only one-eighth to one-fourth of the target amount and then adding another one-eighth to one-fourth every one or two weeks until reaching the target. Lunch -           one high protein shake                           + one a fat snack item     Dinner -          one frozen meal    Shake options (<200 nano, >25 grams/protein) :  Ensure Plant-based, [employer] Lean are lactose free option  (Disclaimer: Dietary supplements rarely have their listed ingredients and the amount of each verified by a third party other. Sometimes they give verification for their claims to be GMO and gluten free and to be organic.  However, even such verifications as these may still be untrustworthy.)     Fat snack options (<150 nano, >11 grams of fat): 22 almonds, 1 1/2 tablespoon of a oil-based dressing or 4 tablespoons of [place] dressing on a bed of salad greens, 1 1/2 tablespoons of peanut butter, 1 Cranberry [place] Blossom options (<100 nano, no sweets): fruit, low fat/high protein [place] yogurt, mozzarella cheese stick, nuts, salad with dressing, peanut butter, chips/crackers/pretzels     Frozen meal options (<350 nano):  Weight Watchers Smart Ones, ActiveO, Healthy Choice, John, Ag[employer]a's     Food substitutes for the shakes:  4 oz of baked, grilled or broiled chicken, turkey or fish, 10 egg whites     Take a multivitamin daily     Walk 30 min every day

## 2022-05-16 NOTE — PROGRESS NOTES
CC -   CLINTON, Obesity    BACKGROUND -   First visit: 05/16/22     Obesity   Began in childhood  Initial BMI 46.8, Wt 264.4 lbs, Ht 5' 3\"  HS Grad wt 200 lbs    RYGB 2005 [facility]  Pre-proc wt 302 lbs  Post-proc wt 178 lbs (18 months)  Wt regain began at 4 yrs    ______________________    [facility] -  Past Medical History:   Diagnosis Date    Atrial fibrillation (HCC)     Obesity     Prolonged emergence from general anesthesia     SENSITIVE TO MEDS, TAKES LESS TO PUT HER UNDER, DIFFICULTY WAKING, STOPS BREATHING PER PATIENT    Thyroid disease      Current Outpatient Medications   Medication Sig Dispense Refill    metFORMIN (GLUCOPHAGE) 500 MG tablet Take 2 tablets by mouth 2 times daily (with meals) 120 tablet 2    TIROSINT 112 MCG CAPS Take 1 tablet Monday through Saturday, 2 tablets on Sunday 34 capsule 5    busPIRone (BUSPAR) 5 MG tablet Take 1 tablet by mouth 3 times daily 90 tablet 0    dilTIAZem (CARDIZEM) 30 MG tablet Take 1 tablet by mouth 4 times daily 120 tablet 0    apixaban (ELIQUIS) 5 MG TABS tablet Take 5 mg by mouth 2 times daily       LORazepam (ATIVAN) 0.5 MG tablet lorazepam 0.5 mg tablet       No current facility-administered medications for this visit. ROS -  Card - + paroxysmal afib    PE -  Gen : /74 (Site: Left Upper Arm, Position: Sitting, Cuff Size: Thigh)   Pulse 82   Temp 97.9 °F (36.6 °C) (Temporal)   Ht 5' 3\" (1.6 m)   Wt 264 lb 6.4 oz (119.9 kg)   BMI 46.84 kg/m²    WN, WD, NAD  Heart:  RRR w/o MGR, no Carotid bruits  Lung: Nml resp effort, CTA b/l  Psych: Normal mood   Full affect  Neuro:  Moves all ext well  ______________________    HISTORY & ASSESSMENT/PLAN -     Problem 1 - CLINTON   HPI -  CLINTON   Diagnosed - 2021   Has not yet received a CPAP; titration study is planned   Daytime sleepiness, past week - 8/10   Assessment - Uncontrolled; excess wt is increasing the degree of the underlying airway obstruction and decreasing oxygenation   Plan -   Wt loss per the plan below    Problem 2 - Obesity   HPI -  See above Background for description  Weight  Date   264.4 lbs 5/16/22  DEN = 1910 nano/d  Initial diet plan: VLCD (chosen b/c of her RYGB anatomy and because eating the same foods as her family will produce too much internal stress/tension from self-restraint)  Initial appetite suppressant: metformin (chosen because of her RYGB anatomy; also, cannot use medications that may increase HR since she has afib; these include phentermine, bupropion, venlafaxine and GLP-1 agonists; topiramate is an option; however, b/c of the higher likelihood of response and the more mild side effect profile, metformin should be tried first)  I warned her of the risk of hypotension and to monitor for orthostatic symptoms  She states she get symptoms suggestive of reactive hypoglycemia. However, these she does not check her BG during these times. I recommended that she trial metformin and to check the BG if hypoglycemic symptoms occur. I instructed her to take two glucose tablets and a tablespoon of PB (or a PB cracker) if it goes below 70 mg/dl. Also, a treatment for post-RYGB related hypoglycemia is to lower the carb content of meals and snacks and to avoid simple sugars. She has a strong craving for sweets. Therefore, metformin may actually decrease her risk of hypoglycemia by allowing her to control her intake of carbs and sweets.  Assessment - Uncontrolled   Plan -   Medication -    Begin taking Metformin 500 mg according to the following schedule:   Week 1 - Take one tablet every morning with breakfast   Week 2 - Take one tablet every morning with breakfast and one tablet every evening with dinner   Week 3 - Take two tablets every morning with breakfast and one tablet every evening with dinner          Week 4 - Take two tablets every morning with breakfast and two tablets every evening with dinner      Breakfast -     one high protein shake                            + 20 grams of fiber.  Do this by either eating 12 tablespoons of the original, plain Fiber One cereal every day or 4 tablespoons of wheat dextrin powder (Benefiber or a generic brand) every day. Work up to this amount slowly by starting with only one-eighth to one-fourth of the target amount and then adding another one-eighth to one-fourth every one or two weeks until reaching the target. Lunch -           one high protein shake                           + one a fat snack item     Dinner -          one frozen meal    Shake options (<200 nano, >25 grams/protein) :  Ensure Plant-based, [employer] Lean are lactose free option  (Disclaimer: Dietary supplements rarely have their listed ingredients and the amount of each verified by a third party other. Sometimes they give verification for their claims to be GMO and gluten free and to be organic.  However, even such verifications as these may still be untrustworthy.)     Fat snack options (<150 nano, >11 grams of fat): 22 almonds, 1 1/2 tablespoon of a oil-based dressing or 4 tablespoons of [place] dressing on a bed of salad greens, 1 1/2 tablespoons of peanut butter, 1 Cranberry [place] [employer] (QEEP) options (<100 nano, no sweets): fruit, low fat/high protein [place] yogurt, mozzarella cheese stick, nuts, salad with dressing, peanut butter, chips/crackers/pretzels     Frozen meal options (<350 nano):  Weight Watchers Smart Ones, [employer] Cuisine, Healthy Choice, Lesa's, Agueda's     Food substitutes for the shakes:  4 oz of baked, grilled or broiled chicken, turkey or fish, 10 egg whites     Take a multivitamin daily     Walk 30 min every day    Total time spent on encounter: 95 min    Marva Bauer MD  Endocrinology/Obesity  5/16/22

## 2022-05-16 NOTE — PROGRESS NOTES
Patient presents with complaint of left middle finger pain , penile pain and gas pain in his stomach. He had a paronychia drained last month. He saw his PCP a few days ago , but the medications are not helping. No fever or chills. Past Medical History:   Diagnosis Date    Chronic kidney disease     Diabetes ([address]     Hypertension        Past Surgical History:   Procedure Laterality Date    HX HIP REPLACEMENT      HX ORTHOPAEDIC           Family History:   Family history unknown: Yes       Social History     Socioeconomic History    Marital status: SINGLE     Spouse name: Not on file    Number of children: Not on file    Years of education: Not on file    Highest education level: Not on file   Occupational History    Not on file   Tobacco Use    Smoking status: Never Smoker    Smokeless tobacco: Never Used   Vaping Use    Vaping Use: Never used   Substance and Sexual Activity    Alcohol use: Yes     Comment: Occasionally    Drug use: Not Currently    Sexual activity: Not Currently   Other Topics Concern    Not on file   Social History Narrative    Not on file     Social Determinants of Health     Financial Resource Strain:     Difficulty of Paying Living Expenses: Not on file   Food Insecurity:     Worried About [address] in the Last Year: Not on file    [address] in the Last Year: Not on file   Transportation Needs:     Lack of Transportation (Medical): Not on file    Lack of Transportation (Non-Medical):  Not on file   Physical Activity:     Days of Exercise per Week: Not on file    Minutes of Exercise per Session: Not on file   Stress:     Feeling of Stress : Not on file   Social Connections:     Frequency of Communication with Friends and Family: Not on file    Frequency of Social Gatherings with Friends and Family: Not on file    Attends Holiness Services: Not on file    Active Member of Clubs or Organizations: Not on file    Attends Club or Organization Meetings: Not on file    Marital Status: Not on file   Intimate Partner Violence:     Fear of Current or Ex-Partner: Not on file    Emotionally Abused: Not on file    Physically Abused: Not on file    Sexually Abused: Not on file   Housing Stability:     Unable to Pay for Housing in the Last Year: Not on file    Number of [place] in the Last Year: Not on file    Unstable Housing in the Last Year: Not on file         ALLERGIES: Patient has no known allergies. Review of Systems   Constitutional: Negative. HENT: Negative. Eyes: Negative. Respiratory: Negative. Cardiovascular: Negative. Gastrointestinal: Positive for abdominal pain. Endocrine: Negative. Genitourinary: Positive for penile discharge and penile pain. Negative for difficulty urinating. Musculoskeletal: Negative. Left middle finger pain   Skin: Negative. Allergic/Immunologic: Negative. Neurological: Negative. Hematological: Negative. Psychiatric/Behavioral: Negative. Vitals:    05/15/22 2339   BP: (!) 138/92   Pulse: (!) 103   Resp: 23   Temp: 99.3 °F (37.4 °C)   SpO2: 95%   Weight: 113.4 kg (250 lb)   Height: 5' 8\" (1.727 m)            Physical Exam  Vitals and nursing note reviewed. HENT:      Head: Normocephalic and atraumatic. Eyes:      Extraocular Movements: Extraocular movements intact. Pupils: Pupils are equal, round, and reactive to light. Cardiovascular:      Rate and Rhythm: Normal rate and regular rhythm. Pulses: Normal pulses. Heart sounds: Normal heart sounds. Pulmonary:      Effort: Pulmonary effort is normal.      Breath sounds: Normal breath sounds. Abdominal:      General: Abdomen is flat. Bowel sounds are normal.      Palpations: Abdomen is soft. Genitourinary:     Penis: Uncircumcised. Erythema and discharge present. No tenderness or swelling. Musculoskeletal:         General: Normal range of motion.       Cervical back: Normal range of motion times per day Cecilia Garay MD   10 mL at 07/08/21 0831    sodium chloride flush 0.9 % injection 5-40 mL  5-40 mL Intravenous PRN Cecilia Garay MD        0.9 % sodium chloride infusion  25 mL Intravenous PRN Cecilia Garay MD        ondansetron (ZOFRAN-ODT) disintegrating tablet 4 mg  4 mg Oral Q8H PRN Cecilia Garay MD        Or    [facility]dansetron [facility] injection 4 mg  4 mg Intravenous Q6H PRN Cecilia Garay MD        polyethylene glycol [facility]) packet 17 g  17 g Oral Daily PRN Cecilia Garay MD        aspirin EC tablet 81 mg  81 mg Oral Daily Cecilia Garay MD   81 mg at 07/08/21 1054    Or    aspirin suppository 300 mg  300 mg Rectal Daily Cecilia Garay MD        atorvastatin (LIPITOR) tablet 40 mg  40 mg Oral Nightly Cecilia Garay MD         Objective:     /80   Pulse 66   Temp 97.6 °F (36.4 °C) (Temporal)   Resp 22   Ht 5' 3.5\" (1.613 m)   Wt 250 lb (113.4 kg)   SpO2 100%   BMI 43.59 kg/m²     General appearance: alert, appears stated age, cooperative and no distress  Head: normocephalic, without obvious abnormality, atraumatic  Eyes: conjunctivae/corneas clear  Neck: spasticity to cervical paraspinals and trapezius  Lungs: Respirations nonlabored  Heart: regular rate and rhythm--NSR  Extremities: normal, atraumatic, no cyanosis or edema  Pulses: 2+ and symmetric  Skin: color, texture, turgor normal---no rashes or lesions      Mental Status: Alert, oriented x4    Appropriate attention/concentration  Intact fundus of knowledge  Intact memories    Speech: no dysarthria  Language: no aphasias    Cranial Nerves:  I: smell NA   II: visual acuity  NA   II: visual fields Full to confrontation   II: pupils CEZAR   III,VII: ptosis None   III,IV,VI: extraocular muscles  Full ROM   V: mastication Normal   V: facial light touch sensation  Normal   V,VII: corneal reflex     VII: facial muscle function - upper  Normal   VII: facial muscle function - lower Normal   VIII: hearing Normal   IX: soft palate elevation  Normal   IX,X: gag reflex    XI: trapezius strength  5/5   XI: sternocleidomastoid strength 5/5   XI: neck extension strength  5/5   XII: tongue strength  Normal     Motor:  5/5 throughout-- somewhat impaired on right hand due to pain in finger  Normal bulk and tone  No drift or abnormal movement    Sensory:  LT normal in all limbs    Laboratory/Radiology:     CBC with Differential:    Lab Results   Component Value Date    WBC 4.4 07/06/2021    RBC 4.40 07/06/2021    HGB 13.5 07/06/2021    HCT 42.1 07/06/2021     07/06/2021    MCV 95.7 07/06/2021    MCH 30.7 07/06/2021    MCHC 32.1 07/06/2021    RDW 14.4 07/06/2021    SEGSPCT 63 04/27/2013    LYMPHOPCT 19.6 07/05/2021    MONOPCT 4.8 07/05/2021    BASOPCT 0.7 07/05/2021    MONOSABS 0.34 07/05/2021    LYMPHSABS 1.39 07/05/2021    EOSABS 0.17 07/05/2021    BASOSABS 0.05 07/05/2021     CMP:    Lab Results   Component Value Date     07/06/2021    K 4.1 07/06/2021     07/06/2021    CO2 28 07/06/2021    BUN 9 07/06/2021    CREATININE 0.7 07/06/2021    GFRAA >60 07/06/2021    LABGLOM >60 07/06/2021    GLUCOSE 90 07/06/2021    GLUCOSE 88 12/08/2010    PROT 7.8 07/05/2021    LABALBU 4.5 07/05/2021    LABALBU 4.1 12/08/2010    CALCIUM 9.2 07/06/2021    BILITOT 0.5 07/05/2021    ALKPHOS 108 07/05/2021    AST 20 07/05/2021    ALT 13 07/05/2021     HgBA1c:    Lab Results   Component Value Date    LABA1C 4.8 07/06/2021     TSH:    Lab Results   Component Value Date    TSH 6.840 06/06/2021     FOLATE:    Lab Results   Component Value Date    FOLATE 13.7 07/06/2021     B12 normal    B1 pending    Methylamonic acid pending    Sjogrens atb neg    MRI cervical spine: Mild multilevel degenerative disc disease without canal stenosis or foraminal narrowing. MRV head: Questionable small filling defect in the left transverse sinus as described above. This is of uncertain significance.   Correlation with CT and neck supple. Comments: Distal left middle finger tender and swollen and tender with no fluctuance. Skin:     General: Skin is warm and dry. Neurological:      General: No focal deficit present. Mental Status: He is oriented to person, place, and time. Mental status is at baseline.    Psychiatric:         Mood and Affect: Mood normal.         Behavior: Behavior normal.          MDM       Procedures

## 2022-05-18 LAB
T4 FREE: 1.6
T4 TOTAL: 9.9
TSH SERPL DL<=0.05 MIU/L-ACNC: 1.93 UIU/ML

## 2022-05-20 DIAGNOSIS — E03.9 HYPOTHYROIDISM, UNSPECIFIED TYPE: ICD-10-CM

## 2022-05-22 ENCOUNTER — TELEPHONE (OUTPATIENT)
Dept: ENDOCRINOLOGY | Age: 52
End: 2022-05-22

## 2022-05-22 DIAGNOSIS — E03.9 HYPOTHYROIDISM, UNSPECIFIED TYPE: ICD-10-CM

## 2022-05-23 NOTE — TELEPHONE ENCOUNTER
Pt is concerned that because she missed some doses while in the hospital, the increased dose of 112mcg on the weekend is going to put her in hyper.  SHe feels like she needs to do 112mcg mon-fri and 100mcg sat-sun

## 2022-05-25 NOTE — TELEPHONE ENCOUNTER
I am ok with this, it's only 12 mcg difference     Please give her scrip from [facility] 112 mcg 5 days a week and 100 mcg 2 days a week

## 2022-05-31 ENCOUNTER — TELEPHONE (OUTPATIENT)
Dept: SLEEP CENTER | Age: 52
End: 2022-05-31

## 2022-05-31 DIAGNOSIS — G47.33 OSA (OBSTRUCTIVE SLEEP APNEA): Primary | ICD-10-CM

## 2022-05-31 NOTE — TELEPHONE ENCOUNTER
Message received that the patient would like to proceed with a home sleep study from [facility] in the sleep lab. Patient was called and identity confirmed. She states that she is nervous about her sleep, having continued symptoms, and does not want to wait till July 12th to have an in-lab sleep study. Her symptoms include dry eyes, dry throat, waking up with difficulty breathing/waking up not wanting to breathe/pain in her eye/waking up feeling hot. She states this was similar to how she felt upon waking when she was Eclamptic and had a seizure with her child. She denies any witnessed movements/convulsions, urinary incontinence during sleep. She does not believe that she a seizure (and attributes her symptoms to sleep disordered breathing), however she had a similar feeling upon waking up. She has no reported/witnessed seizures since her pregnancy. Patient was advised to call her neurologist about this feeling. Patient was offered a new neurology referral, however she declined a new referral. Patient is anxious over the phone. She reports that she called the sleep lab and tried to get in earlier for a sleep study, however there are no in-lab appointments till July. Given her history of Afib, an in-lab sleep study is recommended, however patient would like to proceed with an HST to be evaluated as soon as possible. Can proceed with PSG as needed after HST. She will try the lab again about a cancellation for an in-lab study tonight. Sleep Study Results  No sleep study on file. Previous sleep study at [facility]. Plan  - History of CLINTON, and currently undergoing testing. Patient has anxiety related to sleep, which was improved with Buspar as prescribed during her inpatient stay for CLINTON. Patient would like to be tested as soon as possible with a HST due to her symptoms.  She will call the lab about sooner in-lab cancellation vs. HST.  -Patient advised to call neurologist to review symptoms/  -HST ordered per patient request  -Advised that she establish with psychology.       Next visit is currently scheduled: 6/30/2022         Patient identification was confirmed by 2 forms of personal forms of identification (Birthday and Patient's address)    YOB: 1970  Address: [address] [place] [address] [id number] [facility] [id number]

## 2022-06-01 ENCOUNTER — OFFICE VISIT (OUTPATIENT)
Dept: BARIATRICS/WEIGHT MGMT | Age: 52
End: 2022-06-01
Payer: COMMERCIAL

## 2022-06-01 VITALS
WEIGHT: 265.4 LBS | HEART RATE: 73 BPM | DIASTOLIC BLOOD PRESSURE: 71 MMHG | SYSTOLIC BLOOD PRESSURE: 136 MMHG | TEMPERATURE: 97.3 F | BODY MASS INDEX: 47.02 KG/M2 | HEIGHT: 63 IN

## 2022-06-01 DIAGNOSIS — Z98.84 HX OF GASTRIC BYPASS: ICD-10-CM

## 2022-06-01 DIAGNOSIS — G47.33 OSA (OBSTRUCTIVE SLEEP APNEA): Primary | ICD-10-CM

## 2022-06-01 DIAGNOSIS — E66.01 CLASS 3 SEVERE OBESITY DUE TO EXCESS CALORIES WITHOUT SERIOUS COMORBIDITY WITH BODY MASS INDEX (BMI) OF 45.0 TO 49.9 IN ADULT (HCC): ICD-10-CM

## 2022-06-01 PROCEDURE — 99215 OFFICE O/P EST HI 40 MIN: CPT | Performed by: INTERNAL MEDICINE

## 2022-06-01 PROCEDURE — 99211 OFF/OP EST MAY X REQ PHY/QHP: CPT

## 2022-06-01 NOTE — PATIENT INSTRUCTIONS
Eat on a 7\" plate, level the food off (do not mound it up) and do not go back for seconds. Make at least one-half of the plate non-starchy vegetables. Limit starchy vegetables and grains to 1/4 - 1/2 of the plate    Limit the entree to no more than 1/4 of the plate    Make sure protein intake is at least 60 grams per day     Make sure that fiber intake is at least 22 grams per day (Start at 2-5 grams/day and work up to this very slowly over 6-12 week)    Use 1200 calories/day as your calorie target    Walk at least 30 min/day (work up to this as tolerated)    Check the BG if hypoglycemic symptoms occur. Take two glucose tablets and a tablespoon of PB (or a PB cracker) if it goes below 70 mg/dl.     See me back in 4-6 weeks

## 2022-06-01 NOTE — PROGRESS NOTES
CC -   CLINTON, Obesity    BACKGROUND -   Last visit: 05/16/22  First visit: 05/16/22     Obesity   Began in childhood  Initial BMI 46.8, Wt 264.4 lbs, Ht 5' 3\"  HS Grad wt 200 lbs    RYGB 2005 [facility]  Pre-proc wt 302 lbs  Post-proc wt 178 lbs (18 months)  Wt regain began at 4 yrs    ______________________    [facility] -  Past Medical History:   Diagnosis Date    Atrial fibrillation (HCC)     Obesity     Prolonged emergence from general anesthesia     SENSITIVE TO MEDS, TAKES LESS TO PUT HER UNDER, DIFFICULTY WAKING, STOPS BREATHING PER PATIENT    Thyroid disease      Current Outpatient Medications   Medication Sig Dispense Refill    metFORMIN (GLUCOPHAGE) 500 MG tablet Take 2 tablets by mouth 2 times daily (with meals) 120 tablet 2    TIROSINT 112 MCG CAPS Take 1 tablet Monday through Saturday, 2 tablets on Sunday 34 capsule 5    busPIRone (BUSPAR) 5 MG tablet Take 1 tablet by mouth 3 times daily 90 tablet 0    dilTIAZem (CARDIZEM) 30 MG tablet Take 1 tablet by mouth 4 times daily 120 tablet 0    apixaban (ELIQUIS) 5 MG TABS tablet Take 5 mg by mouth 2 times daily       LORazepam (ATIVAN) 0.5 MG tablet lorazepam 0.5 mg tablet       No current facility-administered medications for this visit. ROS -  Card - + paroxysmal afib    PE -  Gen : /71 (Site: Left Upper Arm, Position: Sitting, Cuff Size: Thigh)   Pulse 73   Temp 97.3 °F (36.3 °C) (Temporal)   Ht 5' 3\" (1.6 m)   Wt 265 lb 6.4 oz (120.4 kg)   BMI 47.01 kg/m²    WN, WD, NAD  Heart:  RRR w/o MGR, no Carotid bruits  Lung: Nml resp effort, CTA b/l  Psych: Normal mood   Full affect  Neuro:  Moves all ext well  ______________________    HISTORY & ASSESSMENT/PLAN -     Problem 1 - CLINTON   HPI -  CLINTON   Diagnosed - 2021   Has not yet received a CPAP; titration study is planned   Daytime sleepiness, past week - 8/10 (was 8/10)   Assessment - Uncontrolled; excess wt is increasing the degree of the underlying airway obstruction and decreasing oxygenation   Plan -   Wt loss per the plan below    Problem 2 - Obesity   HPI -  See above Background for description  Weight  Date   264.4 lbs 5/16/22   265.4 lbs 6/01/22  Total wt loss to date: +1.0 lbs  DEN = 1910 nano/d  Avg daily energy variance:   5/16/22 - 6/01/22 = + 1.0 lbs (  3,500 nano)/15d = + 233 nano/day  Initial diet plan: VLCD (chosen b/c of her RYGB anatomy and because eating the same foods as her family will produce too much internal stress/tension from self-restraint)  Initial appetite suppressant: metformin (chosen because of her RYGB anatomy; also, cannot use medications that may increase HR since she has afib; these include phentermine, bupropion, venlafaxine and GLP-1 agonists; topiramate is an option; however, b/c of the higher likelihood of response and the more mild side effect profile, metformin should be tried first)  I warned her of the risk of hypotension and to monitor for orthostatic symptoms  She states she get symptoms suggestive of reactive hypoglycemia. However, she does not check her BG during these times. I recommended that she trial metformin and to check the BG if hypoglycemic symptoms occur. I instructed her to take two glucose tablets and a tablespoon of PB (or a PB cracker) if it goes below 70 mg/dl. Also, a treatment for post-RYGB related hypoglycemia is to lower the carb content of meals and snacks and to avoid simple sugars. She has a strong craving for sweets. Therefore, metformin may actually decrease her risk of hypoglycemia by allowing her to control her intake of carbs and sweets.                                   Update:  VLCD adherence: has not followed b/c of not tolerating her protein shakes ( or the frozen meal (b/c she does not tolerate dairy)  Meal: home prepared  Protein: Truvani vanilla (chocolate caused palpitation); sometimes takes her all day to drink one shake  Fiber: Did not tolerate Fiber One cereal b/c it made her stool hard and bulky  Fat: s/p Gallbladder  Appetite suppressant: has not started metformin b/c of fearing it will make her BG too low  Vitamin: will not take one that has B12 in it b/c it makes her heart beat fast (but does tolerate B12 injections)  Exercise: none  Checked BG only 2-3x since last visit; all were pre-meal and in the upper 80's  Ate ice cream at one point  Other: has great confidence in supplement (eg a transcutaneous stem-cell stimulator); close relative is a holistic medical \"physician\"; has a lot of restrictions to what she can eat and has fear of hypoglycemia; these were not evident in the last encounter; b/c of them, I do not feel comfortable at this point prescribing any specific plan; medication is not wise since she is taking supplements for which the true ingredients are known with certainty; also, since she is very concerned about hypoglycemia and yet does not check her BG, I do not think metformin is a good option anyway  Will therefore give parameters for protein and fiber and recommend the MyPlate variation described below as a guide to follow.  Assessment - Uncontrolled   Plan -   Eat on a 7\"plate, level the food off (do not mound it up) and do not go back for seconds. Make at least one-half of the plate non-starchy vegetables.     Limit starchy vegetables and grains to 1/4 - 1/2 of the plate    Limit the entree to no more than 1/4 of the plate    See me back in 4-6 weeks    Total time spent on encounter: 50 min    Irina Cuevas MD  Endocrinology/Obesity  6/1/22

## 2022-06-02 RX ORDER — LEVOTHYROXINE SODIUM 100 UG/1
CAPSULE ORAL
Qty: 12 CAPSULE | Refills: 3 | Status: SHIPPED
Start: 2022-06-02 | End: 2022-10-17 | Stop reason: SDUPTHER

## 2022-06-02 RX ORDER — LEVOTHYROXINE SODIUM 112 UG/1
CAPSULE ORAL
Qty: 30 CAPSULE | Refills: 5 | Status: SHIPPED
Start: 2022-06-02 | End: 2022-10-17 | Stop reason: SDUPTHER

## 2022-06-09 DIAGNOSIS — G47.33 OSA (OBSTRUCTIVE SLEEP APNEA): Primary | ICD-10-CM

## 2022-06-15 ENCOUNTER — TELEPHONE (OUTPATIENT)
Dept: SLEEP CENTER | Age: 52
End: 2022-06-15

## 2022-06-21 ENCOUNTER — HOSPITAL ENCOUNTER (OUTPATIENT)
Dept: SLEEP CENTER | Age: 52
Discharge: HOME | End: 2022-06-21
Payer: COMMERCIAL

## 2022-06-21 DIAGNOSIS — G47.33 OSA (OBSTRUCTIVE SLEEP APNEA): ICD-10-CM

## 2022-06-21 PROCEDURE — 95806 SLEEP STUDY UNATT&RESP EFFT: CPT

## 2022-06-23 ENCOUNTER — HOSPITAL ENCOUNTER (EMERGENCY)
Age: 52
Discharge: ANOTHER HOSPITAL | End: 2022-06-23
Attending: EMERGENCY MEDICINE
Payer: COMMERCIAL

## 2022-06-23 VITALS
TEMPERATURE: 97.7 F | SYSTOLIC BLOOD PRESSURE: 144 MMHG | DIASTOLIC BLOOD PRESSURE: 73 MMHG | RESPIRATION RATE: 20 BRPM | HEART RATE: 85 BPM | OXYGEN SATURATION: 100 %

## 2022-06-23 DIAGNOSIS — T30.0 BURN: Primary | ICD-10-CM

## 2022-06-23 LAB
ALBUMIN SERPL-MCNC: 4.3 G/DL (ref 3.5–5.2)
ALP BLD-CCNC: 107 U/L (ref 35–104)
ALT SERPL-CCNC: 12 U/L (ref 0–32)
ANION GAP SERPL CALCULATED.3IONS-SCNC: 12 MMOL/L (ref 7–16)
AST SERPL-CCNC: 20 U/L (ref 0–31)
BASOPHILS ABSOLUTE: 0.06 E9/L (ref 0–0.2)
BASOPHILS RELATIVE PERCENT: 1.1 % (ref 0–2)
BILIRUB SERPL-MCNC: 0.4 MG/DL (ref 0–1.2)
BUN BLDV-MCNC: 9 MG/DL (ref 6–20)
CALCIUM SERPL-MCNC: 9.3 MG/DL (ref 8.6–10.2)
CHLORIDE BLD-SCNC: 102 MMOL/L (ref 98–107)
CO2: 25 MMOL/L (ref 22–29)
CREAT SERPL-MCNC: 0.7 MG/DL (ref 0.5–1)
EOSINOPHILS ABSOLUTE: 0.26 E9/L (ref 0.05–0.5)
EOSINOPHILS RELATIVE PERCENT: 4.6 % (ref 0–6)
GFR AFRICAN AMERICAN: >60
GFR NON-AFRICAN AMERICAN: >60 ML/MIN/1.73
GLUCOSE BLD-MCNC: 98 MG/DL (ref 74–99)
HCT VFR BLD CALC: 41.4 % (ref 34–48)
HEMOGLOBIN: 13.2 G/DL (ref 11.5–15.5)
IMMATURE GRANULOCYTES #: 0.02 E9/L
IMMATURE GRANULOCYTES %: 0.4 % (ref 0–5)
LYMPHOCYTES ABSOLUTE: 2.05 E9/L (ref 1.5–4)
LYMPHOCYTES RELATIVE PERCENT: 36 % (ref 20–42)
MCH RBC QN AUTO: 29.5 PG (ref 26–35)
MCHC RBC AUTO-ENTMCNC: 31.9 % (ref 32–34.5)
MCV RBC AUTO: 92.6 FL (ref 80–99.9)
MONOCYTES ABSOLUTE: 0.37 E9/L (ref 0.1–0.95)
MONOCYTES RELATIVE PERCENT: 6.5 % (ref 2–12)
NEUTROPHILS ABSOLUTE: 2.93 E9/L (ref 1.8–7.3)
NEUTROPHILS RELATIVE PERCENT: 51.4 % (ref 43–80)
PDW BLD-RTO: 14.1 FL (ref 11.5–15)
PLATELET # BLD: 295 E9/L (ref 130–450)
PMV BLD AUTO: 10.8 FL (ref 7–12)
POTASSIUM REFLEX MAGNESIUM: 4.7 MMOL/L (ref 3.5–5)
RBC # BLD: 4.47 E12/L (ref 3.5–5.5)
SODIUM BLD-SCNC: 139 MMOL/L (ref 132–146)
TOTAL PROTEIN: 7.3 G/DL (ref 6.4–8.3)
WBC # BLD: 5.7 E9/L (ref 4.5–11.5)

## 2022-06-23 PROCEDURE — 96376 TX/PRO/DX INJ SAME DRUG ADON: CPT

## 2022-06-23 PROCEDURE — 80053 COMPREHEN METABOLIC PANEL: CPT

## 2022-06-23 PROCEDURE — 2580000003 HC RX 258: Performed by: EMERGENCY MEDICINE

## 2022-06-23 PROCEDURE — 90714 TD VACC NO PRESV 7 YRS+ IM: CPT | Performed by: EMERGENCY MEDICINE

## 2022-06-23 PROCEDURE — 96361 HYDRATE IV INFUSION ADD-ON: CPT

## 2022-06-23 PROCEDURE — 96372 THER/PROPH/DIAG INJ SC/IM: CPT

## 2022-06-23 PROCEDURE — 99284 EMERGENCY DEPT VISIT MOD MDM: CPT

## 2022-06-23 PROCEDURE — 90471 IMMUNIZATION ADMIN: CPT | Performed by: EMERGENCY MEDICINE

## 2022-06-23 PROCEDURE — 6360000002 HC RX W HCPCS: Performed by: EMERGENCY MEDICINE

## 2022-06-23 PROCEDURE — 85025 COMPLETE CBC W/AUTO DIFF WBC: CPT

## 2022-06-23 PROCEDURE — 96374 THER/PROPH/DIAG INJ IV PUSH: CPT

## 2022-06-23 RX ORDER — SODIUM CHLORIDE, SODIUM LACTATE, POTASSIUM CHLORIDE, CALCIUM CHLORIDE 600; 310; 30; 20 MG/100ML; MG/100ML; MG/100ML; MG/100ML
INJECTION, SOLUTION INTRAVENOUS ONCE
Status: COMPLETED | OUTPATIENT
Start: 2022-06-23 | End: 2022-06-23

## 2022-06-23 RX ORDER — FENTANYL CITRATE 50 UG/ML
50 INJECTION, SOLUTION INTRAMUSCULAR; INTRAVENOUS ONCE
Status: COMPLETED | OUTPATIENT
Start: 2022-06-23 | End: 2022-06-23

## 2022-06-23 RX ORDER — TETANUS AND DIPHTHERIA TOXOIDS ADSORBED 2; 2 [LF]/.5ML; [LF]/.5ML
0.5 INJECTION INTRAMUSCULAR ONCE
Status: COMPLETED | OUTPATIENT
Start: 2022-06-23 | End: 2022-06-23

## 2022-06-23 RX ORDER — SODIUM CHLORIDE, SODIUM LACTATE, POTASSIUM CHLORIDE, CALCIUM CHLORIDE 600; 310; 30; 20 MG/100ML; MG/100ML; MG/100ML; MG/100ML
INJECTION, SOLUTION INTRAVENOUS CONTINUOUS
Status: DISCONTINUED | OUTPATIENT
Start: 2022-06-23 | End: 2022-06-23 | Stop reason: HOSPADM

## 2022-06-23 RX ADMIN — FENTANYL CITRATE 50 MCG: 50 INJECTION, SOLUTION INTRAMUSCULAR; INTRAVENOUS at 12:35

## 2022-06-23 RX ADMIN — TETANUS AND DIPHTHERIA TOXOIDS ADSORBED 0.5 ML: 2; 2 INJECTION INTRAMUSCULAR at 10:41

## 2022-06-23 RX ADMIN — FENTANYL CITRATE 50 MCG: 50 INJECTION, SOLUTION INTRAMUSCULAR; INTRAVENOUS at 10:43

## 2022-06-23 RX ADMIN — SODIUM CHLORIDE, POTASSIUM CHLORIDE, SODIUM LACTATE AND CALCIUM CHLORIDE: 600; 310; 30; 20 INJECTION, SOLUTION INTRAVENOUS at 10:52

## 2022-06-23 ASSESSMENT — PAIN SCALES - GENERAL
PAINLEVEL_OUTOF10: 10

## 2022-06-23 ASSESSMENT — PAIN DESCRIPTION - LOCATION: LOCATION: ARM;FOOT;BACK

## 2022-06-23 ASSESSMENT — PAIN DESCRIPTION - ORIENTATION
ORIENTATION: RIGHT;LEFT
ORIENTATION: RIGHT;LEFT

## 2022-06-23 ASSESSMENT — PAIN DESCRIPTION - DESCRIPTORS: DESCRIPTORS: ACHING;TINGLING;NUMBNESS

## 2022-06-23 ASSESSMENT — PAIN - FUNCTIONAL ASSESSMENT: PAIN_FUNCTIONAL_ASSESSMENT: 0-10

## 2022-06-23 ASSESSMENT — PAIN DESCRIPTION - ONSET: ONSET: ON-GOING

## 2022-06-23 ASSESSMENT — PAIN DESCRIPTION - FREQUENCY: FREQUENCY: CONTINUOUS

## 2022-06-23 ASSESSMENT — PAIN DESCRIPTION - PAIN TYPE: TYPE: ACUTE PAIN
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
## 2022-06-24 LAB — STATUS: NORMAL

## 2022-06-24 PROCEDURE — 95806 SLEEP STUDY UNATT&RESP EFFT: CPT | Performed by: STUDENT IN AN ORGANIZED HEALTH CARE EDUCATION/TRAINING PROGRAM

## 2022-06-27 ENCOUNTER — TELEPHONE (OUTPATIENT)
Dept: SLEEP CENTER | Age: 52
End: 2022-06-27

## 2022-06-29 ENCOUNTER — TELEPHONE (OUTPATIENT)
Dept: SLEEP CENTER | Age: 52
End: 2022-06-29

## 2022-06-29 DIAGNOSIS — G47.33 OSA (OBSTRUCTIVE SLEEP APNEA): Primary | ICD-10-CM

## 2022-06-29 NOTE — TELEPHONE ENCOUNTER
Message received that the patient would like to start with a trial of Pap therapy. Patient is currently inpatient. Will start APAP 4-15. Follow-up in 2 to months. Sleep Study Results  Home Sleep Study on 6/21/22     AHI 20.6     SpO2 [place] 84%      Plan  Start APAP 4-15 CWP  Follow-up in 2-3 months - Next visit is currently scheduled: 6/30/2022   ----This appointment will be rescheduled, once the patient gets out of the hospital.       Sleep nurse to go over the following General PAP therapy insurance coverage guidelines:      New PAP Therapy instructions to be compliant with most insurance coverage:  1. Use PAP device for atleast 4 hours nightly. 2. PAP therapy must be used for 70% of nights (5/7 nights per week)  3. Patient must follow up in the clinic within 30-90 days from getting the PAP device.
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
## 2022-07-07 ENCOUNTER — TELEPHONE (OUTPATIENT)
Dept: SLEEP CENTER | Age: 52
End: 2022-07-07

## 2022-07-07 DIAGNOSIS — G47.33 OSA (OBSTRUCTIVE SLEEP APNEA): Primary | ICD-10-CM

## 2022-07-07 NOTE — TELEPHONE ENCOUNTER
Received a call from [facility]. Was told that pt had returned a CPAP and they cannot fill order for new one. Pt will need to be requalified. She had a home sleep test done and will now need to have an in-lab titration.  Will notify

## 2022-07-08 NOTE — TELEPHONE ENCOUNTER
Received a call back from [place] with [facility]. She stated that the claims department informed her that once a pt returns a CPAP they consider it a failed attempt. With a failed attempt the patient is then required to do another SS in lab. Since the pt had a HST she is only required to have an in lab titration  Will notify Dr of need for titration study.

## 2022-07-08 NOTE — TELEPHONE ENCOUNTER
Spoke with pt re CPAP therapy. All information was sent to [facility] per pt req. Pt did tell me that she has been using her grandsons O2 at home and it does make her feel and sleep better at night. She is asking if Dr Kelly Barkley will writer her an order for home O2. She would like to use the O2 while she waits for her CPAP to be sent to her.   Will check with Dr johan HAIRSTON order

## 2022-07-11 ENCOUNTER — TELEPHONE (OUTPATIENT)
Dept: BARIATRICS/WEIGHT MGMT | Age: 52
End: 2022-07-11
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
## 2022-07-25 ENCOUNTER — TELEPHONE (OUTPATIENT)
Dept: SLEEP CENTER | Age: 52
End: 2022-07-25

## 2022-07-25 NOTE — TELEPHONE ENCOUNTER
Message received that in-lab titration required by [employer]. It is unclear why this required by insurance,however this may be a requirement for restart due to history of non-compliance. Will order in-lab titration. Follow up needs to be scheduled.

## 2022-07-25 NOTE — TELEPHONE ENCOUNTER
Call to pt discussed sending cpap orders to [facility] to obtain equipment. Pt agreeable. Pt would like Dr to order 02 she states \"she feels better wearing her grandson 02 during the night that her daughter gave her\".  Dr Onelia Cardoso updated

## 2022-07-26 ENCOUNTER — TELEPHONE (OUTPATIENT)
Dept: SLEEP CENTER | Age: 52
End: 2022-07-26

## 2022-07-26 DIAGNOSIS — R06.9 BREATHING PROBLEM: Primary | ICD-10-CM

## 2022-09-14 ENCOUNTER — TELEMEDICINE (OUTPATIENT)
Dept: ENDOCRINOLOGY | Age: 52
End: 2022-09-14
Payer: COMMERCIAL

## 2022-09-14 DIAGNOSIS — Z98.84 HX OF GASTRIC BYPASS: ICD-10-CM

## 2022-09-14 DIAGNOSIS — E03.9 HYPOTHYROIDISM, UNSPECIFIED TYPE: Primary | ICD-10-CM

## 2022-09-14 DIAGNOSIS — E23.7 PITUITARY DISORDER (HCC): ICD-10-CM

## 2022-09-14 PROCEDURE — 99214 OFFICE O/P EST MOD 30 MIN: CPT | Performed by: INTERNAL MEDICINE
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
## 2022-09-14 NOTE — PROGRESS NOTES
Annemarie Arthur was read the following message We want to confirm that, for purposes of billing, this is a virtual visit with your provider for which we will submit a claim for reimbursement with your insurance company. You will be responsible for any copays, coinsurance amounts or other amounts not covered by your insurance company. If you do not accept this, unfortunately we will not be able to schedule or proceed with a virtual visit with the provider. Do you accept? Martha responded Yes .

## 2022-10-17 DIAGNOSIS — Z98.84 HX OF GASTRIC BYPASS: ICD-10-CM

## 2022-10-17 DIAGNOSIS — E66.01 CLASS 3 SEVERE OBESITY DUE TO EXCESS CALORIES WITHOUT SERIOUS COMORBIDITY WITH BODY MASS INDEX (BMI) OF 45.0 TO 49.9 IN ADULT (HCC): ICD-10-CM

## 2022-10-17 DIAGNOSIS — E03.9 HYPOTHYROIDISM, UNSPECIFIED TYPE: ICD-10-CM

## 2022-10-17 RX ORDER — LEVOTHYROXINE SODIUM 112 UG/1
CAPSULE ORAL
Qty: 78 CAPSULE | Refills: 3 | Status: SHIPPED | OUTPATIENT
Start: 2022-10-17

## 2022-10-17 RX ORDER — LEVOTHYROXINE SODIUM 100 UG/1
CAPSULE ORAL
Qty: 12 CAPSULE | Refills: 3 | Status: SHIPPED | OUTPATIENT
Start: 2022-10-17

## 2022-11-01 DIAGNOSIS — G47.33 OSA (OBSTRUCTIVE SLEEP APNEA): Primary | ICD-10-CM

## 2022-11-15 ENCOUNTER — TELEPHONE (OUTPATIENT)
Dept: SLEEP CENTER | Age: 52
End: 2022-11-15

## 2022-11-15 ENCOUNTER — OFFICE VISIT (OUTPATIENT)
Dept: ENT CLINIC | Age: 52
End: 2022-11-15
Payer: COMMERCIAL

## 2022-11-15 VITALS
DIASTOLIC BLOOD PRESSURE: 79 MMHG | BODY MASS INDEX: 47.24 KG/M2 | SYSTOLIC BLOOD PRESSURE: 132 MMHG | HEIGHT: 63 IN | HEART RATE: 91 BPM | WEIGHT: 266.6 LBS

## 2022-11-15 DIAGNOSIS — R09.81 NASAL CONGESTION: ICD-10-CM

## 2022-11-15 DIAGNOSIS — H04.129 DRY EYE: Primary | ICD-10-CM

## 2022-11-15 DIAGNOSIS — R09.82 POST-NASAL DRAINAGE: ICD-10-CM

## 2022-11-15 DIAGNOSIS — H04.129 DRY EYE: ICD-10-CM

## 2022-11-15 DIAGNOSIS — J34.89 DRY NOSE: ICD-10-CM

## 2022-11-15 DIAGNOSIS — J31.0 CHRONIC RHINITIS: ICD-10-CM

## 2022-11-15 DIAGNOSIS — R68.2 DRY MOUTH: ICD-10-CM

## 2022-11-15 PROCEDURE — 99204 OFFICE O/P NEW MOD 45 MIN: CPT | Performed by: OTOLARYNGOLOGY

## 2022-11-15 PROCEDURE — 3074F SYST BP LT 130 MM HG: CPT | Performed by: OTOLARYNGOLOGY

## 2022-11-15 PROCEDURE — 3078F DIAST BP <80 MM HG: CPT | Performed by: OTOLARYNGOLOGY

## 2022-11-15 RX ORDER — AZELASTINE 1 MG/ML
2 SPRAY, METERED NASAL DAILY
Qty: 120 ML | Refills: 3 | Status: SHIPPED | OUTPATIENT
Start: 2022-11-15

## 2022-11-15 ASSESSMENT — ENCOUNTER SYMPTOMS
SINUS PRESSURE: 1
SHORTNESS OF BREATH: 0
CHEST TIGHTNESS: 0
COUGH: 1
EYE PAIN: 1
RHINORRHEA: 1
ALLERGIC/IMMUNOLOGIC NEGATIVE: 1

## 2022-11-15 NOTE — PATIENT INSTRUCTIONS
Use nasal saline spray 2-3 times per day on a regular basis, recommend Carter Hammer mist nasal spray or AYR gel   You may continue with artifical tear eye drops  Use Astelin nasal spray as prescribed   Stay well hydrated with water

## 2022-11-15 NOTE — PROGRESS NOTES
Department of Otolaryngology  Office Consult Note  11/15/22          Subjective:        Chief Complaint:  had concerns including Other (NP persistent sinusitis). Patient ID: Leslie Jones is a 46 y.o. female. HPI: Patient presents as  new patient for sinus issues. Patient reports significant dry eye especially in the morning with blurry vision intermittently, associated with dry nose as well as post nasal drainage and cough. Associated symptoms also include right frontal headache worsening with coughing. 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 States she gets sinus infections 1-2 times per year described as nasal congestion, sinus pressure and drainage. Denies seasonal allergies. These symptoms are all year round. She does have GERD, takes omeprazole as needed. Denies dyspnea, dysphagia, weight loss, fever, chills, vertigo, hearing changes. Denies sinus surgery history. Had allergy testing years ago but was inconclusive       Review of Systems   Constitutional: Negative. HENT:  Positive for congestion, postnasal drip, rhinorrhea and sinus pressure. Eyes:  Positive for pain. Respiratory:  Positive for cough. Negative for chest tightness and shortness of breath. Allergic/Immunologic: Negative. Negative for environmental allergies and food allergies. Neurological: Negative. Negative for dizziness. Hematological: Negative. Psychiatric/Behavioral: Negative. All other systems reviewed and are negative.       Past Medical History:   Diagnosis Date    Atrial fibrillation ([address]     Obesity     Prolonged emergence from general anesthesia     SENSITIVE TO MEDS, TAKES LESS TO PUT HER UNDER, DIFFICULTY WAKING, STOPS BREATHING PER PATIENT    Thyroid disease      Past Surgical History:   Procedure Laterality Date    ABDOMEN SURGERY       SECTION      x2    CHOLECYSTECTOMY      COLONOSCOPY N/A 2020 COLORECTAL CANCER SCREENING, HIGH RISK performed by Emma Arango MD at [facility] (CERVIX STATUS UNKNOWN)      TUBAL LIGATION      UPPER GASTROINTESTINAL ENDOSCOPY N/A 1/29/2020    EGD BIOPSY performed by Emma Arango MD at [facility]       Current Outpatient Medications:     azelastine (ASTELIN) 0.1 % nasal spray, 2 sprays by Nasal route daily Use in each nostril as directed, Disp: 120 mL, Rfl: 3    Levothyroxine Sodium (TIROSINT) 100 MCG CAPS, 1 capsule every Sunday, Disp: 12 capsule, Rfl: 3    Levothyroxine Sodium (TIROSINT) 112 MCG CAPS, Take 1 tablet Monday through Saturday, Disp: 78 capsule, Rfl: 3    metFORMIN (GLUCOPHAGE) 500 MG tablet, Take 2 tablets by mouth 2 times daily (with meals), Disp: 120 tablet, Rfl: 2    busPIRone (BUSPAR) 5 MG tablet, Take 1 tablet by mouth 3 times daily, Disp: 90 tablet, Rfl: 0    dilTIAZem (CARDIZEM) 30 MG tablet, Take 1 tablet by mouth 4 times daily, Disp: 120 tablet, Rfl: 0    apixaban (ELIQUIS) 5 MG TABS tablet, Take 5 mg by mouth 2 times daily , Disp: , Rfl:     LORazepam (ATIVAN) 0.5 MG tablet, lorazepam 0.5 mg tablet, Disp: , Rfl:   Avelox [moxifloxacin hcl in nacl], Cephalexin, Ciprofloxacin, Clindamycin, Coffee bean extract [coffea arabica], Magnesium sulfate, Milk-related compounds, Penicillins, Percocet [oxycodone-acetaminophen], and Sulfa antibiotics  Social History     Tobacco Use    Smoking status: Never    Smokeless tobacco: Never   Vaping Use    Vaping Use: Never used   Substance Use Topics    Alcohol use: No    Drug use: No     No family history on file. Objective:   /79 (Site: Right Upper Arm, Position: Sitting, Cuff Size: Large Adult)   Pulse 91   Ht 5' 3\" (1.6 m)   Wt 266 lb 9.6 oz (120.9 kg)   BMI 47.23 kg/m²     Physical Exam  Vitals reviewed. Constitutional:       Appearance: Normal appearance. She is obese. HENT:      Head: Normocephalic.       Right Ear: Tympanic membrane, ear canal and external ear normal.      Left Ear: Tympanic membrane, ear canal and external ear normal.      Mouth/Throat:      Mouth: Mucous membranes are moist.      Pharynx: Oropharynx is clear. Uvula midline. Eyes:      Conjunctiva/sclera: Conjunctivae normal.      Comments: Mildly swollen punctate right   Neck:      Trachea: Trachea normal.   Cardiovascular:      Rate and Rhythm: Normal rate. Pulses: Normal pulses. Pulmonary:      Effort: Pulmonary effort is normal.   Musculoskeletal:      Cervical back: Normal range of motion and neck supple. Lymphadenopathy:      Cervical: No cervical adenopathy. Skin:     Capillary Refill: Capillary refill takes less than 2 seconds. Neurological:      Mental Status: She is alert and oriented to person, place, and time. Assessment:       Diagnosis Orders   1. Dry eye  SJOGREN'S ANTIBODIES (SS-A, SS-B)      2. Dry nose  SJOGREN'S ANTIBODIES (SS-A, SS-B)      3. Dry mouth  SJOGREN'S ANTIBODIES (SS-A, SS-B)      4. Nasal congestion        5. Post-nasal drainage        6. Chronic rhinitis                Plan:        Dry nose, dry eye, dry mouth, nasal congestion  -CT Head from 07/2021 reviewed with shows right maxillary mucous retention cyst non obstructing ,the rest of the sinuses are clear without disease masses or polyps.  If symptoms persist despite medical therapy may consider repeat ct sinus in the future   -potentially allergic in nature  -will get SSA SSB to r/o srojens disease   -will start on astelin and nasal saline spray for nasal moisture and post nasal drainage  -has mildly enlarged punctate on the right, may have clogged tear duct reportedly had procedure years ago, advised to see ophthalmologist. Use artifical tears      Follow up in 6 weeks for reevaluation         Courtney Wolff DO  Resident Physician  [facility] [place], [place])  Otolaryngology Residency  11/15/2022  9:08 AM    Electronically signed by Jake Cormier DO on 11/15/22 at8:41 MARY Hawkins  1970      I have discussed the case, including pertinent history and exam findings with the resident. I have seen and examined the patient and the key elements of the encounter have been performed by me. I agree with the assessment, plan and orders as documented by the resident. Patient here for follow up of medical problems. Remainder of medical problems as per resident note.       [facility], DO  12/5/22

## 2022-11-16 LAB
ENA TO SSA (RO) ANTIBODY: NEGATIVE
ENA TO SSB (LA) ANTIBODY: NEGATIVE

## 2023-01-10 ENCOUNTER — OFFICE VISIT (OUTPATIENT)
Dept: ENDOCRINOLOGY | Age: 53
End: 2023-01-10
Payer: COMMERCIAL

## 2023-01-10 VITALS
HEART RATE: 60 BPM | HEIGHT: 63 IN | RESPIRATION RATE: 18 BRPM | OXYGEN SATURATION: 98 % | BODY MASS INDEX: 46.95 KG/M2 | SYSTOLIC BLOOD PRESSURE: 126 MMHG | WEIGHT: 265 LBS | DIASTOLIC BLOOD PRESSURE: 84 MMHG

## 2023-01-10 DIAGNOSIS — E03.9 HYPOTHYROIDISM, UNSPECIFIED TYPE: Primary | ICD-10-CM

## 2023-01-10 DIAGNOSIS — R53.82 CHRONIC FATIGUE: ICD-10-CM

## 2023-01-10 DIAGNOSIS — Z98.84 HX OF GASTRIC BYPASS: ICD-10-CM

## 2023-01-10 PROCEDURE — 3074F SYST BP LT 130 MM HG: CPT | Performed by: INTERNAL MEDICINE

## 2023-01-10 PROCEDURE — 3078F DIAST BP <80 MM HG: CPT | Performed by: INTERNAL MEDICINE

## 2023-01-10 PROCEDURE — 99214 OFFICE O/P EST MOD 30 MIN: CPT | Performed by: INTERNAL MEDICINE
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
## 2023-01-14 PROBLEM — R53.82 CHRONIC FATIGUE: Status: ACTIVE | Noted: 2023-01-14

## 2023-02-13 ENCOUNTER — OFFICE VISIT (OUTPATIENT)
Dept: SLEEP MEDICINE | Age: 53
End: 2023-02-13
Payer: COMMERCIAL

## 2023-02-13 VITALS
OXYGEN SATURATION: 98 % | HEART RATE: 73 BPM | HEIGHT: 63 IN | DIASTOLIC BLOOD PRESSURE: 91 MMHG | RESPIRATION RATE: 14 BRPM | WEIGHT: 268 LBS | SYSTOLIC BLOOD PRESSURE: 140 MMHG | BODY MASS INDEX: 47.48 KG/M2

## 2023-02-13 DIAGNOSIS — G47.33 OSA (OBSTRUCTIVE SLEEP APNEA): Primary | ICD-10-CM

## 2023-02-13 PROCEDURE — 99215 OFFICE O/P EST HI 40 MIN: CPT | Performed by: INTERNAL MEDICINE

## 2023-02-13 PROCEDURE — 3074F SYST BP LT 130 MM HG: CPT | Performed by: INTERNAL MEDICINE

## 2023-02-13 PROCEDURE — 3078F DIAST BP <80 MM HG: CPT | Performed by: INTERNAL MEDICINE

## 2023-02-13 ASSESSMENT — SLEEP AND FATIGUE QUESTIONNAIRES
HOW LIKELY ARE YOU TO NOD OFF OR FALL ASLEEP IN A CAR, WHILE STOPPED FOR A FEW MINUTES IN TRAFFIC: 0
HOW LIKELY ARE YOU TO NOD OFF OR FALL ASLEEP WHILE SITTING QUIETLY AFTER LUNCH WITHOUT ALCOHOL: 2
ESS TOTAL SCORE: 11
HOW LIKELY ARE YOU TO NOD OFF OR FALL ASLEEP WHILE WATCHING TV: 2
HOW LIKELY ARE YOU TO NOD OFF OR FALL ASLEEP WHILE SITTING AND READING: 2
HOW LIKELY ARE YOU TO NOD OFF OR FALL ASLEEP WHEN YOU ARE A PASSENGER IN A CAR FOR AN HOUR WITHOUT A BREAK: 1
HOW LIKELY ARE YOU TO NOD OFF OR FALL ASLEEP WHILE LYING DOWN TO REST IN THE AFTERNOON WHEN CIRCUMSTANCES PERMIT: 2
HOW LIKELY ARE YOU TO NOD OFF OR FALL ASLEEP WHILE SITTING AND TALKING TO SOMEONE: 1
HOW LIKELY ARE YOU TO NOD OFF OR FALL ASLEEP WHILE SITTING INACTIVE IN A PUBLIC PLACE: 1

## 2023-02-13 NOTE — PROGRESS NOTES
[facility] Sleep Medicine    Patient Name: Kulwant Workman  Age: 46 y.o.   : 1970    Date of Visit: 23        HPI   Kulwant Workman is a 46 y.o. female with  has a past medical history of Atrial fibrillation ([address] Obesity, Prolonged emergence from general anesthesia, and Thyroid disease. who presents as a follow-up patient to Sleep Clinic for Sleep Apnea  . Sleep Medicine 2023 3/31/2022   Sitting and reading 2 2   Watching TV 2 2   Sitting, inactive in a public place (e.g. a theatre or a meeting) 1 1   As a passenger in a car for an hour without a break 1 0   Lying down to rest in the afternoon when circumstances permit 2 2   Sitting and talking to someone 1 0   Sitting quietly after a lunch without alcohol 2 3   In a car, while stopped for a few minutes in traffic 0 0   [place] Sleepiness Score 11 10         F/u CLINTON  Pt previously was seen by Dr Shelby Mary March of last year, was new pt at that time. Dx CLINTON in  and CPAP was repossessed due to decreased usage. She had HST -- with AHI 20.6    Was set up with CPAP 22 from [address] hx maptod dystrophy - dry eyes. 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 Has tried multiple CPAP masks including nasal pillow and full face mask  Ffm seems to work best but still leaking into corner of eyes  Other masks had difficulty with sealing, especially when sleeping on side     For a while was sick and could not wear the CPAP. Daughter gave her oxygen to use, she felt that this was better, did not cause dry eyes. Currently using oxygen only. She did have an overnight oximetry study but did not qualify for supplemental oxygen.          PMH:  Past Medical History:   Diagnosis Date    Atrial fibrillation ([address]     Obesity     Prolonged emergence from general anesthesia     SENSITIVE TO MEDS, TAKES LESS TO PUT HER UNDER, DIFFICULTY WAKING, STOPS BREATHING PER PATIENT    Thyroid disease         PSH:  Past Surgical History:   Procedure Laterality Date    ABDOMEN SURGERY       SECTION      x2    CHOLECYSTECTOMY      COLONOSCOPY N/A 2020    COLORECTAL CANCER SCREENING, HIGH RISK performed by Rodney Weiss MD at [facility] (CERVIX STATUS UNKNOWN)      TUBAL LIGATION      UPPER GASTROINTESTINAL ENDOSCOPY N/A 2020    EGD BIOPSY performed by Rodney Weiss MD at [address]  Hx:  Social History     Tobacco Use    Smoking status: Never    Smokeless tobacco: Never   Vaping Use    Vaping Use: Never used   Substance Use Topics    Alcohol use: No    Drug use: No        Fam Hx:  History reviewed. No pertinent family history. Current Outpatient Medications   Medication Sig Dispense Refill    azelastine (ASTELIN) 0.1 % nasal spray 2 sprays by Nasal route daily Use in each nostril as directed 120 mL 3    Levothyroxine Sodium (TIROSINT) 100 MCG CAPS 1 capsule every  12 capsule 3    Levothyroxine Sodium (TIROSINT) 112 MCG CAPS Take 1 tablet Monday through Saturday 78 capsule 3    metFORMIN (GLUCOPHAGE) 500 MG tablet Take 2 tablets by mouth 2 times daily (with meals) 120 tablet 2    busPIRone (BUSPAR) 5 MG tablet Take 1 tablet by mouth 3 times daily 90 tablet 0    dilTIAZem (CARDIZEM) 30 MG tablet Take 1 tablet by mouth 4 times daily 120 tablet 0    LORazepam (ATIVAN) 0.5 MG tablet lorazepam 0.5 mg tablet       No current facility-administered medications for this visit.         Review of Systems  (Sleep ROS above)  Review of Systems         Objective:   Physical Exam  BP (!) 140/91 (Site: Left Lower Arm, Position: Sitting, Cuff Size: Medium Adult)   Pulse 73   Resp 14   Ht 5' 3\" (1.6 m)   Wt 268 lb (121.6 kg)   SpO2 98%   BMI 47.47 kg/m²        Physical Exam        Sleep Medicine 2023 3/31/2022   Sitting and reading 2 2   Watching TV 2 2   Sitting, inactive in a public place (e.g. a theatre or a meeting) 1 1 As a passenger in a car for an hour without a break 1 0   Lying down to rest in the afternoon when circumstances permit 2 2   Sitting and talking to someone 1 0   Sitting quietly after a lunch without alcohol 2 3   In a car, while stopped for a few minutes in traffic 0 0   [place] Sleepiness Score 11 10         SLEEP STUDY HISTORY      6-21-22 HST AHI 20.6,  T88 3 minutes  \        PERTINENT LAB RESULTS  No results found for: FERRITIN       Assessment:      Martha was seen today for sleep apnea. Diagnoses and all orders for this visit:    CLINTON (obstructive sleep apnea)     Plan:      Reviewed results of previous sleep studies in detail    She does not meet criteria for Inspire due to BMI    Does not met criteria for overnight oxygen     When she first started on the CPAP she actually did feel better and migraines improved but the dry eyes are a barrier to usage. We discussed options for the CPAP including using an eye mask to help with the dry eyes, or could try oral appliance. She will call to let me know of her decision.        Total time spent on encounter 75 min    Rinku Alegria, [address],[place] [address]  Office Phone -[phone], option 2  Fax- [phone]

## 2023-02-17 ENCOUNTER — TELEPHONE (OUTPATIENT)
Dept: SLEEP CENTER | Age: 53
End: 2023-02-17

## 2023-02-19 DIAGNOSIS — G47.33 OSA (OBSTRUCTIVE SLEEP APNEA): Primary | ICD-10-CM

## 2023-04-21 ENCOUNTER — TELEPHONE (OUTPATIENT)
Dept: SLEEP MEDICINE | Age: 53
End: 2023-04-21

## 2023-04-21 NOTE — TELEPHONE ENCOUNTER
Received a call from [facility] with insurance--CPAP has been approved from 4/20/23 - 7/18/23  Auth #[id number]

## 2023-05-10 ENCOUNTER — TELEMEDICINE (OUTPATIENT)
Dept: ENDOCRINOLOGY | Age: 53
End: 2023-05-10
Payer: COMMERCIAL

## 2023-05-10 DIAGNOSIS — E55.9 VITAMIN D DEFICIENCY: ICD-10-CM

## 2023-05-10 DIAGNOSIS — E04.2 MULTINODULAR GOITER: ICD-10-CM

## 2023-05-10 DIAGNOSIS — E03.9 HYPOTHYROIDISM, UNSPECIFIED TYPE: Primary | ICD-10-CM

## 2023-05-10 PROCEDURE — 99214 OFFICE O/P EST MOD 30 MIN: CPT | Performed by: CLINICAL NURSE SPECIALIST

## 2023-05-10 NOTE — PROGRESS NOTES
[address] [facility] Department of Endocrinology Diabetes and Metabolism   [address] [facility], [address] [place] [id number]   Phone: [phone]  Fax: [phone]    Date of Service: 5/10/2023  Primary Care Physician: Eze Juarez DO  Provider: ROSS Macedo - BRAYAN      Reason for the visit:  Primary Hypothyroidism    History of Present Illness: The history is provided by the patient. No  was used. Accuracy of the patient data is excellent. Alyssa Hong is a very pleasant 48 y.o. female seen today for follow up visit   The patent was diagnosed with hypothyroidism in her early 19's. She is currently on Tirosint 112 mcg 6 days a week and 100 mcg on Sunday. Patient takes Tirosint in the morning at empty stomach, wait one hour before eating , avoid multivitamins containing calcium  or iron with it. Pt has h/o gastric bypass surgery 16 years ago and since then she has been struggling to maintain normal  thyroid level     Lab Results   Component Value Date/Time    TSH 1.930 05/18/2022 12:00 AM    T4FREE 1.6 05/18/2022 12:00 AM    X0KPPET 9.9 05/18/2022 12:00 AM    U2XWFBZ 82.29 06/06/2021 02:02 AM    TSI <0.10 10/03/2020 08:12 AM    TPOABS 2.6 10/03/2020 08:12 AM    THGAB <0.9 10/03/2020 08:12 AM       MNG   Thyroid US 10/2019   Atrophic heterogenous  thyroid gland   Rt lobe measures 2.6 x 0.7 x 0.7 cm, Lt lobe measures 2.1 x \0.8 x 0.5 cm, isthmus measures 0.1 cm   Non specific calcification in interpolar region measures 0.2 cm      Thyroid US 12/2020 --> tiny 2 and 2 mm thyroid nodules     Martha Bagley reported some discomfort at thyroid level but denies any voice change, or shortness of breath. No family history of thyroid cancer. No prior history of radiation to head or neck region.       PAST MEDICAL HISTORY   Past Medical History:   Diagnosis Date    Atrial fibrillation ([address]     Obesity     Prolonged emergence from general anesthesia     SENSITIVE TO MEDS, TAKES

## 2023-05-10 NOTE — PROGRESS NOTES
Shannon Alvarenga was read the following message We want to confirm that, for purposes of billing, this is a virtual visit with your provider for which we will submit a claim for reimbursement with your insurance company. You will be responsible for any copays, coinsurance amounts or other amounts not covered by your insurance company. If you do not accept this, unfortunately we will not be able to schedule or proceed with a virtual visit with the provider. Do you accept? Martha responded Yes .

## 2023-07-11 ENCOUNTER — TELEPHONE (OUTPATIENT)
Dept: SLEEP MEDICINE | Age: 53
End: 2023-07-11

## 2023-07-11 NOTE — TELEPHONE ENCOUNTER
Received a call from [facility] insurance  CPAP is approved from 7/10/23 - 01/7/23  Auth [id number]

## 2023-08-28 DIAGNOSIS — E03.9 HYPOTHYROIDISM, UNSPECIFIED TYPE: ICD-10-CM

## 2023-08-30 RX ORDER — LEVOTHYROXINE SODIUM 100 UG/1
CAPSULE ORAL
Qty: 12 CAPSULE | Refills: 0 | Status: SHIPPED | OUTPATIENT
Start: 2023-08-30

## 2023-09-06 ENCOUNTER — TELEPHONE (OUTPATIENT)
Dept: ENDOCRINOLOGY | Age: 53
End: 2023-09-06

## 2023-09-06 DIAGNOSIS — H05.20 EXOPHTHALMUS: Primary | ICD-10-CM

## 2023-09-06 NOTE — TELEPHONE ENCOUNTER
Pt is worried she has thyroid eye disease, and is having eye surgery, eye surgeon is worried thyroid is attacking her eye. Rt eye surgery PTK to be done ,no date as yet. Blurry vision and double vision.  Faxed labs to [facility]

## 2023-09-11 NOTE — TELEPHONE ENCOUNTER
Previous blood work reviewed showed actually negative antibody for thyroid-stimulating immunoglobulin. This is the antibiotic in the past.  I would like to recheck insulin again.   Thank you

## 2023-10-12 ENCOUNTER — TELEPHONE (OUTPATIENT)
Dept: NON INVASIVE DIAGNOSTICS | Age: 53
End: 2023-10-12

## 2023-10-12 NOTE — TELEPHONE ENCOUNTER
Pt called stated she has been having heart flutter and wanted to E-mail her EKG's from her smart watch. I did give her Arelis's email. She stated she was in Af[place] yesterday and then today when she was changing her daughter she started to feel off and was shaky and cold,  stated she did take a laxative yesterday and is concerned about her thyroid and she did call her Endocrinologist and had labs at [address] [facility] today.

## 2023-10-13 NOTE — TELEPHONE ENCOUNTER
EKG strips imported to patient chart and sent to Dr. Estefania Zepeda for review. Patient is aware Dr. Estefania Zepeda is out of the office and it won't be until next week that I get a response. Patient verbalized understanding.      Electronically signed by Mark Zamora MA on 10/13/2023 at 8:37 AM

## 2023-10-20 NOTE — TELEPHONE ENCOUNTER
"Subjective:       Patient ID: Mickey Larson is a 38 y.o. male.    Chief Complaint: Annual Exam    HPI Comments: Patient is here for follow-up of their chronic diseases, see last note for details    Saw urology about infertility, varicole and may have the latter repaired.    Sees GI for an EGD soon and will discuss with them whether to stay on PPI long term, has some GERD Sx on H2 blockers      Review of Systems   Constitutional: Negative for chills and fatigue.   HENT: Negative for ear pain.    Eyes: Negative for pain.   Respiratory: Negative for chest tightness.    Cardiovascular: Negative for chest pain.   Gastrointestinal: Negative for abdominal pain.   Genitourinary: Negative for flank pain.   Musculoskeletal: Negative for gait problem.   Neurological: Negative for syncope.   Psychiatric/Behavioral: Negative for behavioral problems.       Objective:      Physical Exam   Constitutional: He appears well-developed.   HENT:   Head: Normocephalic and atraumatic.   Eyes: EOM are normal.   Neck: Neck supple.   Cardiovascular: Normal rate and normal heart sounds.    Pulmonary/Chest: Breath sounds normal. No respiratory distress. He has no wheezes. He has no rales.   Abdominal: Soft.   Musculoskeletal: He exhibits no edema.   Neurological: He is alert.   Skin: Skin is dry.   Psychiatric: His behavior is normal.       Assessment:       1. Essential hypertension    2. Annual physical exam    3. Insomnia, unspecified type    4. Varicocele    5. Gastroesophageal reflux disease, esophagitis presence not specified        Plan:       Mickey was seen today for annual exam.    Diagnoses and all orders for this visit:    Essential hypertension  -BP borderline today BP (!) 130/90  Pulse 76  Ht 6' 1" (1.854 m)  Wt 110.4 kg (243 lb 6.2 oz)  SpO2 96%  BMI 32.11 kg/m2  - he says dirkay <140/90, will monitor at home and let me know if it is not meeting goal  -     CBC auto differential; Future  -     Lipid panel; Future  -     TSH; " Pt notified of results and verbalized understanding.     Electronically signed by Urbano Bass MA on 10/20/2023 at 3:11 PM Future  -     Comprehensive metabolic panel; Future  - if BPs high at home, add HCTZ 12.5 to his norvasc 10 + diovan 320    Annual physical exam  -     Tdap Vaccine  -     CBC auto differential; Future  -     Lipid panel; Future  -     TSH; Future  -     Hemoglobin A1c; Future  -     Comprehensive metabolic panel; Future    Insomnia, unspecified type  -     zolpidem (AMBIEN) 10 mg Tab; Take 1 tablet (10 mg total) by mouth nightly as needed.    Varicocele  -     Followed by urology    Gastroesophageal reflux disease, esophagitis presence not specified  - followed by GI    Return in about 1 year (around 3/24/2018) for annual pe.

## 2023-11-13 ENCOUNTER — OFFICE VISIT (OUTPATIENT)
Dept: ENDOCRINOLOGY | Age: 53
End: 2023-11-13
Payer: COMMERCIAL

## 2023-11-13 VITALS
WEIGHT: 279 LBS | RESPIRATION RATE: 18 BRPM | OXYGEN SATURATION: 98 % | SYSTOLIC BLOOD PRESSURE: 139 MMHG | HEIGHT: 64 IN | DIASTOLIC BLOOD PRESSURE: 76 MMHG | BODY MASS INDEX: 47.63 KG/M2 | HEART RATE: 67 BPM

## 2023-11-13 DIAGNOSIS — Z98.84 HX OF GASTRIC BYPASS: ICD-10-CM

## 2023-11-13 DIAGNOSIS — E04.2 MULTINODULAR GOITER: Primary | ICD-10-CM

## 2023-11-13 DIAGNOSIS — E03.9 HYPOTHYROIDISM, UNSPECIFIED TYPE: ICD-10-CM

## 2023-11-13 DIAGNOSIS — E55.9 VITAMIN D DEFICIENCY: ICD-10-CM

## 2023-11-13 PROCEDURE — 3074F SYST BP LT 130 MM HG: CPT | Performed by: INTERNAL MEDICINE

## 2023-11-13 PROCEDURE — 99214 OFFICE O/P EST MOD 30 MIN: CPT | Performed by: INTERNAL MEDICINE

## 2023-11-13 PROCEDURE — 3078F DIAST BP <80 MM HG: CPT | Performed by: INTERNAL MEDICINE

## 2023-11-13 RX ORDER — LEVOTHYROXINE SODIUM 112 UG/1
CAPSULE ORAL
Qty: 90 CAPSULE | Refills: 3 | Status: SHIPPED | OUTPATIENT
Start: 2023-11-13

## 2023-11-13 NOTE — PROGRESS NOTES
Please call pt and inform her that TSH is mildly elevated.   If she is taking Tirosint 112 mcg 1 tablet Monday thru Saturday, 100 mcg on Sunday have her adjust to 112 mcg 1 tablet daily
10:59 AM    MCH 29.5 06/23/2022 10:59 AM    MCHC 31.9 (L) 06/23/2022 10:59 AM    RDW 14.1 06/23/2022 10:59 AM     06/23/2022 10:59 AM    MPV 10.8 06/23/2022 10:59 AM      Lab Results   Component Value Date/Time     06/23/2022 10:59 AM    K 4.7 06/23/2022 10:59 AM    CO2 25 06/23/2022 10:59 AM    BUN 9 06/23/2022 10:59 AM    CREATININE 0.7 06/23/2022 10:59 AM    CALCIUM 9.3 06/23/2022 10:59 AM    LABGLOM >60 06/23/2022 10:59 AM    GFRAA >60 06/23/2022 10:59 AM      Lab Results   Component Value Date/Time    TSH 1.930 05/18/2022 12:00 AM    T4FREE 1.6 05/18/2022 12:00 AM    X8CDUNK 9.9 05/18/2022 12:00 AM    Z9FMXIR 82.29 06/06/2021 02:02 AM    TSI <0.10 10/03/2020 08:12 AM    TPOABS 2.6 10/03/2020 08:12 AM    THGAB <0.9 10/03/2020 08:12 AM     Lab Results   Component Value Date/Time    LABA1C 4.8 07/06/2021 05:59 AM    GLUCOSE 98 06/23/2022 10:59 AM    GLUCOSE 88 12/08/2010 11:38 AM     Lab Results   Component Value Date/Time    TRIG 95 07/06/2021 05:59 AM    HDL 77 07/06/2021 05:59 AM    LDLCALC 122 07/06/2021 05:59 AM    CHOL 218 07/06/2021 05:59 AM     Lab Results   Component Value Date/Time    VITD25 21 07/06/2021 10:33 AM    VITD25 32 05/28/2021 12:20 PM     ASSESSMENT & RECOMMENDATIONS   Chris Meredith, a 48 y.o.-old female seen in for following issues     Primary hypothyroidism   I had a long discussion with pt and encouraged he take Tirosin every morning at empty stomach, wait one hour before eating , avoid multivitamins containing calcium  or iron with it  Tirosint 112 mcg 6 days a week and 100 mcg 1 days a wk. The had gastric bypass surgery about 16 years ago and since then she has been struggling to maintain normal  thyroid level     VitD deficiency  Continue vitD supplement      MNG   I have reviewed previous thyroid US images myself (10/2019, 12/2020). Thyroid gland is very heterogenous and atrophic without any discrete nodules. Finding very consistent with chronic thyroiditis.  Non specific

## 2024-02-08 ENCOUNTER — TELEPHONE (OUTPATIENT)
Dept: ENDOCRINOLOGY | Age: 54
End: 2024-02-08

## 2024-02-08 DIAGNOSIS — E03.9 HYPOTHYROIDISM, UNSPECIFIED TYPE: ICD-10-CM

## 2024-02-09 RX ORDER — LEVOTHYROXINE SODIUM 112 UG/1
CAPSULE ORAL
Qty: 90 CAPSULE | Refills: 3 | Status: SHIPPED | OUTPATIENT
Start: 2024-02-09

## 2024-02-19 DIAGNOSIS — E03.9 HYPOTHYROIDISM, UNSPECIFIED TYPE: Primary | ICD-10-CM

## 2024-02-19 RX ORDER — LEVOTHYROXINE SODIUM 112 UG/1
112 CAPSULE ORAL DAILY
Qty: 90 CAPSULE | Refills: 2 | Status: SHIPPED | OUTPATIENT
Start: 2024-02-19

## 2024-04-23 ENCOUNTER — TELEPHONE (OUTPATIENT)
Dept: NON INVASIVE DIAGNOSTICS | Age: 54
End: 2024-04-23

## 2024-04-23 NOTE — TELEPHONE ENCOUNTER
The patient called requesting an appointment. The patient no showed on 04/19/24 due to an emergency and has been rescheduled.       Electronically signed by Arelsi Murguia MA on 4/23/2024 at 3:46 PM

## 2024-04-24 ENCOUNTER — OFFICE VISIT (OUTPATIENT)
Dept: NON INVASIVE DIAGNOSTICS | Age: 54
End: 2024-04-24
Payer: COMMERCIAL

## 2024-04-24 VITALS
WEIGHT: 260 LBS | DIASTOLIC BLOOD PRESSURE: 90 MMHG | OXYGEN SATURATION: 96 % | BODY MASS INDEX: 46.07 KG/M2 | RESPIRATION RATE: 16 BRPM | HEART RATE: 56 BPM | SYSTOLIC BLOOD PRESSURE: 140 MMHG | HEIGHT: 63 IN

## 2024-04-24 DIAGNOSIS — G47.33 OSA (OBSTRUCTIVE SLEEP APNEA): ICD-10-CM

## 2024-04-24 DIAGNOSIS — E03.8 OTHER SPECIFIED HYPOTHYROIDISM: ICD-10-CM

## 2024-04-24 DIAGNOSIS — R00.2 PALPITATIONS: Primary | ICD-10-CM

## 2024-04-24 DIAGNOSIS — I48.0 PAROXYSMAL ATRIAL FIBRILLATION (HCC): ICD-10-CM

## 2024-04-24 DIAGNOSIS — I10 ESSENTIAL HYPERTENSION, BENIGN: ICD-10-CM

## 2024-04-24 PROCEDURE — 3077F SYST BP >= 140 MM HG: CPT | Performed by: NURSE PRACTITIONER

## 2024-04-24 PROCEDURE — 3080F DIAST BP >= 90 MM HG: CPT | Performed by: NURSE PRACTITIONER

## 2024-04-24 PROCEDURE — 93000 ELECTROCARDIOGRAM COMPLETE: CPT | Performed by: STUDENT IN AN ORGANIZED HEALTH CARE EDUCATION/TRAINING PROGRAM

## 2024-04-24 PROCEDURE — 99215 OFFICE O/P EST HI 40 MIN: CPT | Performed by: NURSE PRACTITIONER

## 2024-04-24 NOTE — PROGRESS NOTES
Thyroid regulated   CLINTON on cpap now   Now not having a lot of afib.   Takes Cardizem only when she has afib.   Has only had to take this in January this year. Last time was 1/4/2024.   Has Apple watch.   Has palpitations at times.   1-2 times a year in the past 2 years per watch.   Was on bisprolol in the past by Dr Norwood in 2022. Was only taking as needed.     Only take Cardizem as needed.     ADHD - wants to be put on medications per her PCP.               
providers name at this time but states she has been going to preferred care counseling in [place].  There is no contraindication of ADHD medication use in this patient.  2.  Previously patient was taking Cardizem and bisoprolol as needed if patient was tachycardic or went into atrial fib.  Advised to only take Cardizem as needed if has AF episodes.  3.  Will request recent echo and monitor done at Dr. Dumont office in [place]  4 office visit in 6 months or sooner if needed      Re-education on importance of well controlled HTN (goal BP < 130/80), adequate weight control (goal BMI of < 27), physical activity consisting of moderate cardiopulmonary exercise up to a goal of 250 min/wk, smoking/tobacco abstinence and limited ETOH intake.     I have spent a total of 45 minutes with the patient and the family reviewing the above stated recommendations. And a total of >50% of that time involved face-to-face time providing counseling and or coordination of care with the other providers.    Thank you for allowing me to participate in your patient's care.  Please call me if there are any questions or concerns.        Latosha Faria, APRN - CNP  Cardiac Electrophysiology  [facility]  [facility]  3:46 PM  4/24/2024

## 2024-05-16 ENCOUNTER — TELEMEDICINE (OUTPATIENT)
Dept: ENDOCRINOLOGY | Age: 54
End: 2024-05-16
Payer: COMMERCIAL

## 2024-05-16 DIAGNOSIS — E04.2 MULTINODULAR GOITER: ICD-10-CM

## 2024-05-16 DIAGNOSIS — L65.9 HAIR LOSS: Primary | ICD-10-CM

## 2024-05-16 DIAGNOSIS — E03.9 HYPOTHYROIDISM, UNSPECIFIED TYPE: ICD-10-CM

## 2024-05-16 PROCEDURE — 99214 OFFICE O/P EST MOD 30 MIN: CPT | Performed by: INTERNAL MEDICINE

## 2024-05-16 RX ORDER — CYCLOSPORINE 0.5 MG/ML
EMULSION OPHTHALMIC
COMMUNITY
Start: 2024-05-07

## 2024-05-16 RX ORDER — LISDEXAMFETAMINE DIMESYLATE 30 MG/1
30 CAPSULE ORAL EVERY MORNING
COMMUNITY
Start: 2024-05-02

## 2024-05-16 NOTE — PROGRESS NOTES
Martha Salcido was read the following message “We want to confirm that, for purposes of billing, this is a virtual visit with your provider for which we will submit a claim for reimbursement with your insurance company. You will be responsible for any copays, coinsurance amounts or other amounts not covered by your insurance company. If you do not accept this, unfortunately we will not be able to schedule or proceed with a virtual visit with the provider. Do you accept? Martha responded Yes .
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
## 2024-06-06 NOTE — TELEPHONE ENCOUNTER
She is requesting to see you tomorrow in office which was her original date to see you. She is very anxious about AF and has lots of questions/concerns. Ok to schedule her w/ you without her monitor being completed? 7

## 2024-06-10 ENCOUNTER — OFFICE VISIT (OUTPATIENT)
Dept: SLEEP CENTER | Age: 54
End: 2024-06-10
Payer: COMMERCIAL

## 2024-06-10 VITALS
WEIGHT: 271.39 LBS | DIASTOLIC BLOOD PRESSURE: 73 MMHG | BODY MASS INDEX: 48.09 KG/M2 | HEIGHT: 63 IN | SYSTOLIC BLOOD PRESSURE: 122 MMHG | RESPIRATION RATE: 16 BRPM | HEART RATE: 89 BPM | OXYGEN SATURATION: 100 %

## 2024-06-10 DIAGNOSIS — R06.02 SOB (SHORTNESS OF BREATH): ICD-10-CM

## 2024-06-10 DIAGNOSIS — G47.19 EXCESSIVE DAYTIME SLEEPINESS: ICD-10-CM

## 2024-06-10 DIAGNOSIS — G47.33 OBSTRUCTIVE SLEEP APNEA: Primary | ICD-10-CM

## 2024-06-10 PROCEDURE — 99214 OFFICE O/P EST MOD 30 MIN: CPT | Performed by: NURSE PRACTITIONER

## 2024-06-10 ASSESSMENT — SLEEP AND FATIGUE QUESTIONNAIRES
ESS TOTAL SCORE: 11
HOW LIKELY ARE YOU TO NOD OFF OR FALL ASLEEP WHILE WATCHING TV: MODERATE CHANCE OF DOZING
HOW LIKELY ARE YOU TO NOD OFF OR FALL ASLEEP IN A CAR, WHILE STOPPED FOR A FEW MINUTES IN TRAFFIC: WOULD NEVER DOZE
HOW LIKELY ARE YOU TO NOD OFF OR FALL ASLEEP WHILE LYING DOWN TO REST IN THE AFTERNOON WHEN CIRCUMSTANCES PERMIT: HIGH CHANCE OF DOZING
HOW LIKELY ARE YOU TO NOD OFF OR FALL ASLEEP WHILE SITTING AND READING: HIGH CHANCE OF DOZING
HOW LIKELY ARE YOU TO NOD OFF OR FALL ASLEEP WHILE SITTING INACTIVE IN A PUBLIC PLACE: WOULD NEVER DOZE
HOW LIKELY ARE YOU TO NOD OFF OR FALL ASLEEP WHILE SITTING QUIETLY AFTER LUNCH WITHOUT ALCOHOL: HIGH CHANCE OF DOZING
HOW LIKELY ARE YOU TO NOD OFF OR FALL ASLEEP WHEN YOU ARE A PASSENGER IN A CAR FOR AN HOUR WITHOUT A BREAK: WOULD NEVER DOZE
HOW LIKELY ARE YOU TO NOD OFF OR FALL ASLEEP WHILE SITTING AND TALKING TO SOMEONE: WOULD NEVER DOZE

## 2024-06-10 NOTE — PROGRESS NOTES
[facility] Sleep Medicine    Patient Name: Martha Salcido  Age: 54 y.o.   : 1970    Date of Visit: 6/10/24        Review of Last Visit Summary:    The patient was last seen on 3/31/2022 by Dr. Gutierrez for  Obstructive Sleep Apnea.    Interim History:     Martha Salcido is a 54 y.o. female that  has a past medical history of Atrial fibrillation (HCC), Obesity, Prolonged emergence from general anesthesia, and Thyroid disease. She presents in follow up to Sleep Clinic to review CPAP adherence and efficacy.     Interval Events:    -Has been using her CPAP over the past year but still is experiencing breakthrough symptoms of a.m. headaches, excessive fatigue and brain fog, and shortness of breath.  -At one point she was using her daughter's oxygen nocturnally, but did not qualify for supplemental oxygen per Dr. Gutierrez's previous note.   -History of maptod dystrophy - continues to have dry eye from mask.   -Uses a F20 memory foam mask.  Feels pressure needs increased.  -Requests to see pulmonary, as her SOB continues to the day. She states there is an anatomical abnormality with her diaphragm that she would like evaluated.  -Taking 0.5 mg ativan at bedtime, Vyvanse in the day for ADHD.  -Concerned she needs oxygen with CPAP.    DME:[facility]    Sleep Study History: Home Sleep Study on 22     AHI 20.6     SpO2 [place] 84%    Past Medical History:  Past Medical History:   Diagnosis Date    Atrial fibrillation (HCC)     Obesity     Prolonged emergence from general anesthesia     SENSITIVE TO MEDS, TAKES LESS TO PUT HER UNDER, DIFFICULTY WAKING, STOPS BREATHING PER PATIENT    Thyroid disease        Past Surgical History:        Procedure Laterality Date    ABDOMEN SURGERY       SECTION      x2    CHOLECYSTECTOMY      COLONOSCOPY N/A 2020    COLORECTAL CANCER SCREENING, HIGH RISK performed by Arabella Molina MD at [facility]    GASTRIC BYPASS SURGERY      HERNIA REPAIR      HYSTERECTOMY (CERVIX STATUS

## 2024-06-10 NOTE — PATIENT INSTRUCTIONS
Ultimate Comfort Sleep Mask with Moisture Reserving Chambers - Prevent Dry Eyes, Designed for CPAP Users, Light Absorbing, Perfect Fit for Undisturbed Sleep (Dark)    Sleep Eye Mask with Moisture Chamber - Prevents Dry Eyes, Designed for CPAP Users, Reusable, Perfect Fit (1)    RemZzzs Full Face Cpap Mask Liners (K2-FM) - Reduce Noisy Air Leaks and Painful Blisters - Cpap Supplies and Accessories - Compatible with Resmed Respironics [employer]

## 2024-06-12 ENCOUNTER — HOSPITAL ENCOUNTER (OUTPATIENT)
Dept: ULTRASOUND IMAGING | Age: 54
Discharge: HOME | End: 2024-06-14
Payer: COMMERCIAL

## 2024-06-12 DIAGNOSIS — E04.2 MULTINODULAR GOITER: ICD-10-CM

## 2024-06-12 PROCEDURE — 76536 US EXAM OF HEAD AND NECK: CPT

## 2024-06-18 ENCOUNTER — TELEPHONE (OUTPATIENT)
Dept: ENDOCRINOLOGY | Age: 54
End: 2024-06-18

## 2024-06-18 NOTE — TELEPHONE ENCOUNTER
----- Message from ROSS Lynn - CNS sent at 6/17/2024 10:39 AM EDT -----  Please call pt and inform her I have reviewed thyroid US.  Veery tiny nodules that are not concerning .  Will continue to observe for now

## 2024-06-26 ENCOUNTER — TELEPHONE (OUTPATIENT)
Dept: SLEEP CENTER | Age: 54
End: 2024-06-26

## 2024-06-26 DIAGNOSIS — G47.34 NOCTURNAL OXYGEN DESATURATION: ICD-10-CM

## 2024-06-26 DIAGNOSIS — G47.33 OBSTRUCTIVE SLEEP APNEA: Primary | ICD-10-CM

## 2024-06-26 NOTE — PROGRESS NOTES
Titration study denied by insurance. Will order updated overnight pulse oximetry to be performed with CPAP.

## 2024-06-26 NOTE — TELEPHONE ENCOUNTER
Pt notified of insurance denial of titration study and that NP was ordering a overnight pulse ox while on cpap and to call the office with any questions or concerns

## 2024-07-08 ENCOUNTER — TELEPHONE (OUTPATIENT)
Dept: SLEEP CENTER | Age: 54
End: 2024-07-08

## 2024-07-08 NOTE — PROGRESS NOTES
Reviewed overnight pulse oximetry results, as below. No need for supplemental O2 at this time, continue current CPAP settings.

## 2024-07-31 ENCOUNTER — HOSPITAL ENCOUNTER (OUTPATIENT)
Age: 54
Discharge: HOME | End: 2024-07-31
Payer: COMMERCIAL

## 2024-07-31 DIAGNOSIS — E04.2 MULTINODULAR GOITER: ICD-10-CM

## 2024-07-31 DIAGNOSIS — E03.9 HYPOTHYROIDISM, UNSPECIFIED TYPE: ICD-10-CM

## 2024-07-31 DIAGNOSIS — L65.9 HAIR LOSS: ICD-10-CM

## 2024-07-31 LAB
ERYTHROCYTE [DISTWIDTH] IN BLOOD BY AUTOMATED COUNT: 14.4 % (ref 11.5–15)
FERRITIN SERPL-MCNC: 44 NG/ML
FSH SERPL-ACNC: 101.7 MIU/ML
HCT VFR BLD AUTO: 40.2 % (ref 34–48)
HGB BLD-MCNC: 12.9 G/DL (ref 11.5–15.5)
IRON SATN MFR SERPL: 23 % (ref 15–50)
IRON SERPL-MCNC: 82 UG/DL (ref 37–145)
LH SERPL-ACNC: 49.6 MIU/ML
MCH RBC QN AUTO: 30.6 PG (ref 26–35)
MCHC RBC AUTO-ENTMCNC: 32.1 G/DL (ref 32–34.5)
MCV RBC AUTO: 95.3 FL (ref 80–99.9)
PLATELET # BLD AUTO: 280 K/UL (ref 130–450)
PMV BLD AUTO: 10.3 FL (ref 7–12)
RBC # BLD AUTO: 4.22 M/UL (ref 3.5–5.5)
T4 FREE SERPL-MCNC: 1.4 NG/DL (ref 0.9–1.7)
TIBC SERPL-MCNC: 349 UG/DL (ref 250–450)
TSH SERPL DL<=0.05 MIU/L-ACNC: 0.84 UIU/ML (ref 0.27–4.2)
WBC OTHER # BLD: 6.4 K/UL (ref 4.5–11.5)

## 2024-07-31 PROCEDURE — 84403 ASSAY OF TOTAL TESTOSTERONE: CPT

## 2024-07-31 PROCEDURE — 84270 ASSAY OF SEX HORMONE GLOBUL: CPT

## 2024-07-31 PROCEDURE — 85027 COMPLETE CBC AUTOMATED: CPT

## 2024-07-31 PROCEDURE — 36415 COLL VENOUS BLD VENIPUNCTURE: CPT

## 2024-07-31 PROCEDURE — 84443 ASSAY THYROID STIM HORMONE: CPT

## 2024-07-31 PROCEDURE — 83550 IRON BINDING TEST: CPT

## 2024-07-31 PROCEDURE — 83540 ASSAY OF IRON: CPT

## 2024-07-31 PROCEDURE — 83001 ASSAY OF GONADOTROPIN (FSH): CPT

## 2024-07-31 PROCEDURE — 82728 ASSAY OF FERRITIN: CPT

## 2024-07-31 PROCEDURE — 84439 ASSAY OF FREE THYROXINE: CPT

## 2024-07-31 PROCEDURE — 83002 ASSAY OF GONADOTROPIN (LH): CPT

## 2024-08-02 LAB
SHBG SERPL-SCNC: 56 NMOL/L (ref 17–125)
TESTOST FREE MFR SERPL: 1.1 PG/ML (ref 0.6–3.8)
TESTOST SERPL-MCNC: 9 NG/DL (ref 3–41)
TESTOSTERONE, BIOAVAILABLE: 2.7 NG/DL (ref 1.5–9.4)

## 2024-08-05 ENCOUNTER — TELEPHONE (OUTPATIENT)
Dept: ENDOCRINOLOGY | Age: 54
End: 2024-08-05

## 2024-08-05 DIAGNOSIS — E03.9 HYPOTHYROIDISM, UNSPECIFIED TYPE: Primary | ICD-10-CM

## 2024-08-05 RX ORDER — LEVOTHYROXINE SODIUM 0.1 MG/1
TABLET ORAL
Qty: 21 TABLET | Refills: 1 | Status: SHIPPED
Start: 2024-08-05 | End: 2024-08-09

## 2024-08-05 NOTE — TELEPHONE ENCOUNTER
Spoke with Romel in person. He says ok to try below regimen. Pt notified. Needs script for the 100mcg. Just picked up 112.

## 2024-08-05 NOTE — TELEPHONE ENCOUNTER
Patient has not got the testosterone level drawn since it was time sensitive.     Patient is concerned that the level is on the low end of normal, she gets symptoms when it is that low like high blood pressure and palpitations. She thinks she need to go back to 112mcg Monday -Saturday and Sunday 100mcg. After skipping a dose she said she has been feeling much better.

## 2024-08-05 NOTE — TELEPHONE ENCOUNTER
----- Message from ROSS Lynn - CNS sent at 8/1/2024  8:35 AM EDT -----  Please call patient and inform her thyroid levels are normal.  Continue same for now.  Iron is also normal.  I am awaiting testosterone result

## 2024-08-05 NOTE — TELEPHONE ENCOUNTER
Spoke with patient and gave recommendations of 100mcg daily. Patient says Dr. CUELLO tried that before and it wasn't enough for her. She really thinks the 112mcg Monday-Saturday and 100mcg Sun day will work best for her. Pt also says she has to take her dose at night because she had gastric bypass and her sugar goes low in the morning and she has to eat so takes at bedtime.

## 2024-08-06 ENCOUNTER — TELEPHONE (OUTPATIENT)
Dept: ENDOCRINOLOGY | Age: 54
End: 2024-08-06

## 2024-08-06 NOTE — TELEPHONE ENCOUNTER
Pt notified no need to repeat testosterone and to refer to OB for menopausal symptoms.    Pt questioning if her sweating at night and fatigue could possible related to reactive hypoglycemia. Says she was diagnosed with this previously and says we put a dexcom on her . I founds notes regarding this and it looks like Dr. CORTEZ had just advised pt to watch her carbs, sweets, starches.

## 2024-08-06 NOTE — TELEPHONE ENCOUNTER
----- Message from ROSS Lynn - CNS sent at 8/6/2024  8:11 AM EDT -----  Please call patient and inform her testosterone levels are normal.  Excellent result

## 2024-08-06 NOTE — TELEPHONE ENCOUNTER
Patient said she told them not to do it because it was out of the time frame, do you want her to repeat it?     She wants to know why the estrogen and progesterone wasn't checked due to post menopausal and the symptoms she is having.     Symptoms include warm at night, hot flashes at night with sweating. Sexual desire is gone, fatigued.     She wants to know if you do bio identical hormones for menopause or if she should talk to her GYN

## 2024-08-07 NOTE — TELEPHONE ENCOUNTER
Noted we can send her in another dexcom but if it says low she will need to confirm with fingerstick

## 2024-08-07 NOTE — TELEPHONE ENCOUNTER
Yes that is typically treatment.  Please send her in a meter and test strips and have her check BG when symptoms occur if she does not have one already

## 2024-08-07 NOTE — TELEPHONE ENCOUNTER
I already tried to talk pt into meter an test strips. She says she feels like her sugar levels change so fast and she gets so tired sometimes and will forget to check her blood sugar but that if she has a dexcom on she wont even have to think about it.

## 2024-08-08 RX ORDER — ACYCLOVIR 400 MG/1
1 TABLET ORAL
Qty: 3 EACH | Refills: 2 | Status: SHIPPED | OUTPATIENT
Start: 2024-08-08

## 2024-08-09 DIAGNOSIS — E03.9 HYPOTHYROIDISM, UNSPECIFIED TYPE: ICD-10-CM

## 2024-08-09 RX ORDER — LEVOTHYROXINE SODIUM 100 UG/1
100 CAPSULE ORAL
Qty: 12 CAPSULE | Refills: 0 | Status: SHIPPED | OUTPATIENT
Start: 2024-08-09

## 2024-08-09 RX ORDER — LEVOTHYROXINE SODIUM 112 UG/1
112 CAPSULE ORAL DAILY
Qty: 78 CAPSULE | Refills: 0 | Status: SHIPPED | OUTPATIENT
Start: 2024-08-09

## 2024-08-09 NOTE — TELEPHONE ENCOUNTER
Patient called and states she takes Tirostint, name brand only. Her current dose is 112mcg 6 days a week and 100mcg on Sunday.

## 2024-08-13 ENCOUNTER — OFFICE VISIT (OUTPATIENT)
Dept: PULMONOLOGY | Age: 54
End: 2024-08-13
Payer: COMMERCIAL

## 2024-08-13 VITALS
WEIGHT: 271 LBS | DIASTOLIC BLOOD PRESSURE: 81 MMHG | OXYGEN SATURATION: 98 % | BODY MASS INDEX: 46.26 KG/M2 | TEMPERATURE: 98.4 F | HEART RATE: 64 BPM | HEIGHT: 64 IN | SYSTOLIC BLOOD PRESSURE: 130 MMHG

## 2024-08-13 DIAGNOSIS — R06.00 DYSPNEA, UNSPECIFIED TYPE: ICD-10-CM

## 2024-08-13 DIAGNOSIS — J98.6 PARALYSIS OF DIAPHRAGM: Primary | ICD-10-CM

## 2024-08-13 PROCEDURE — 99204 OFFICE O/P NEW MOD 45 MIN: CPT | Performed by: INTERNAL MEDICINE

## 2024-08-13 RX ORDER — PROPRANOLOL HYDROCHLORIDE 10 MG/1
10 TABLET ORAL 2 TIMES DAILY PRN
COMMUNITY
Start: 2024-07-29

## 2024-08-13 RX ORDER — POLYMYXIN B SULFATE AND TRIMETHOPRIM 1; 10000 MG/ML; [USP'U]/ML
1 SOLUTION OPHTHALMIC 2 TIMES DAILY
COMMUNITY
Start: 2024-03-06

## 2024-08-13 RX ORDER — VARENICLINE 0.03 MG/.05ML
0.03 SPRAY NASAL EVERY 12 HOURS PRN
COMMUNITY

## 2024-08-13 ASSESSMENT — ENCOUNTER SYMPTOMS
ALLERGIC/IMMUNOLOGIC NEGATIVE: 1
WHEEZING: 1
SHORTNESS OF BREATH: 1
EYES NEGATIVE: 1

## 2024-08-13 NOTE — PROGRESS NOTES
Patient to follow up with physician after all testing is completed. Orders for PFT/6 min walk and Sniff Test will be scheduled @ [facility]. Orders for chest x-ray was given to the patient to have done today.   
Medications:     TIROSINT 112 MCG CAPS, Take 112 mcg by mouth daily Take Monday-Saturday, Disp: 78 capsule, Rfl: 0    TIROSINT 100 MCG CAPS, Take 100 mcg by mouth every 7 days, Disp: 12 capsule, Rfl: 0    Continuous Glucose Sensor (DEXCOM G7 SENSOR) MISC, 1 each by Does not apply route every 10 days, Disp: 3 each, Rfl: 2    VYVANSE 30 MG capsule, Take 1 capsule by mouth every morning., Disp: , Rfl:     lidocaine viscous hcl (XYLOCAINE) 2 % SOLN solution, Place 15 mLs vaginally as needed (for pain), Disp: 20 mL, Rfl: 1    omeprazole (PRILOSEC) 10 MG delayed release capsule, Take by mouth daily, Disp: , Rfl:     LORazepam (ATIVAN) 1 MG tablet, Take 1 tablet by mouth nightly., Disp: , Rfl:     estradiol (ESTRACE VAGINAL) 0.1 MG/GM vaginal cream, Apply pea size amount vaginally 3 times a week at night time, Disp: 60 g, Rfl: 3    dilTIAZem (CARDIZEM) 30 MG tablet, Take 1 tablet by mouth 4 times daily (Patient taking differently: Take 1 tablet by mouth as needed Only if in A-FIB), Disp: 120 tablet, Rfl: 0    trimethoprim-polymyxin b (POLYTRIM) 28919-9.1 UNIT/ML-% ophthalmic solution, Apply 1 drop to eye 2 times daily (Patient not taking: Reported on 8/13/2024), Disp: , Rfl:     propranolol (INDERAL) 10 MG tablet, Take 1 tablet by mouth 2 times daily as needed (Patient not taking: Reported on 8/13/2024), Disp: , Rfl:     TYRVAYA 0.03 MG/ACT SOLN, Inhale 0.03 mg into the lungs every 12 hours as needed (Patient not taking: Reported on 8/13/2024), Disp: , Rfl:     cycloSPORINE (RESTASIS) 0.05 % ophthalmic emulsion, INSTILL 1 DROP INTO BOTH EYES TWICE DAILY (Patient not taking: Reported on 8/13/2024), Disp: , Rfl:     valACYclovir (VALTREX) 500 MG tablet, Take 1 tablet by mouth daily (Patient not taking: Reported on 4/24/2024), Disp: , Rfl:     betaxolol (KERLONE) 10 MG tablet, Take 0.5 tablets by mouth daily Take only if in Tachycardia (Patient not taking: Reported on 8/13/2024), Disp: , Rfl:      I reviewed the patient's

## 2024-10-24 NOTE — PROGRESS NOTES
[facility] CARDIOLOGY  CARDIAC ELECTROPHYSIOLOGY DEPARTMENT/DIVISION OF CARDIOLOGY  Outpatient Progress Report  PATIENT: Martha Salcido  MEDICAL RECORD NUMBER: [id number]  DATE OF SERVICE:  10/25/2024  ATTENDING ELECTROPHYSIOLOGIST:  George Youssef D.O.  REFERRING PHYSICIAN: No ref. provider found and Cabrera Ahn DO  CHIEF COMPLAINT: AF    HPI: Martha Salcido is a 54 y.o. female with a history of nonvalvular paroxysmal AF, HTN, morbid obesity sp gastric bypass (?), CLINTON-noncompliant with CPAP, GERD, hypothyroidism, and anxiety.  She is managed by Dr Dumont (Cardiology) with diltiazem 30 mg PRN.  In 5/2021, she was diagnosed with nonvalvular paroxysmal AF.  Her AF symptoms are palpitations.  She believes sleep is a trigger.  She has a history of CLINTON, but noncompliant with CPAP.  In 3/2022, she had recurrence of AF with RVR at 139 bpm, which was treated with Cardizem.  Shortly after this she spontaneously converted to sinus rhythm. She presents today, 10/25/24 for follow up. She reports episode of AF around the time of influenza infection in 9/2024. She also reports after most recent titration of thyroid medication, her palpitation symptoms improved. She denies any other complaints at this time.    Prior cardiac testing:  Event monitor (Dr. Dumont 2/28/2023): SR w/ PAC and PVCs  ECG (3/21/2022): Sinus rhythm at 83 bpm.  48-hour Holter monitor (2/2/2022): Sinus rhythm, rare PACs and PVCs, 2 episodes of nonsustained SVT (longest 5 beats), patient events without clear correlation to dysrhythmia, no AF, VT, advanced AV block, or significant pauses.  14-day event monitor (6/21/2021): SR 40 to 148 bpm (mean: 65 bpm), 26 patient events without clear correlation to dysrhythmias, SVE burden less than 1%, VE burden less than 1%, 1 episode of asymptomatic nonsustained VT (6 beats), 13 episodes of nonsustained SVT (longest: 12 seconds at 111 bpm, fastest: 167 bpm for 18 beats), no AF, advanced AV block

## 2024-10-25 ENCOUNTER — OFFICE VISIT (OUTPATIENT)
Dept: NON INVASIVE DIAGNOSTICS | Age: 54
End: 2024-10-25
Payer: COMMERCIAL

## 2024-10-25 VITALS
HEIGHT: 62 IN | SYSTOLIC BLOOD PRESSURE: 116 MMHG | WEIGHT: 274.2 LBS | DIASTOLIC BLOOD PRESSURE: 68 MMHG | RESPIRATION RATE: 18 BRPM | HEART RATE: 64 BPM | BODY MASS INDEX: 50.46 KG/M2

## 2024-10-25 DIAGNOSIS — I48.0 PAROXYSMAL ATRIAL FIBRILLATION (HCC): Primary | ICD-10-CM

## 2024-10-25 PROCEDURE — 99214 OFFICE O/P EST MOD 30 MIN: CPT | Performed by: STUDENT IN AN ORGANIZED HEALTH CARE EDUCATION/TRAINING PROGRAM

## 2024-10-25 PROCEDURE — 93000 ELECTROCARDIOGRAM COMPLETE: CPT | Performed by: STUDENT IN AN ORGANIZED HEALTH CARE EDUCATION/TRAINING PROGRAM

## 2024-10-25 NOTE — PATIENT INSTRUCTIONS
No medication changes at this time.  You were provided Dr Guallpa's contact information per your request for potential alternative Endocrinologist.  Follow-up with this office in ~1 year.

## 2024-10-28 ENCOUNTER — TELEMEDICINE (OUTPATIENT)
Dept: ENDOCRINOLOGY | Age: 54
End: 2024-10-28
Payer: COMMERCIAL

## 2024-10-28 DIAGNOSIS — E03.9 HYPOTHYROIDISM, UNSPECIFIED TYPE: Primary | ICD-10-CM

## 2024-10-28 DIAGNOSIS — E04.2 MULTINODULAR GOITER: ICD-10-CM

## 2024-10-28 DIAGNOSIS — Z98.84 HX OF GASTRIC BYPASS: ICD-10-CM

## 2024-10-28 PROCEDURE — NBSRV NON-BILLABLE SERVICE: Performed by: CLINICAL NURSE SPECIALIST

## 2024-10-28 PROCEDURE — G2211 COMPLEX E/M VISIT ADD ON: HCPCS | Performed by: CLINICAL NURSE SPECIALIST

## 2024-10-28 PROCEDURE — 99214 OFFICE O/P EST MOD 30 MIN: CPT | Performed by: CLINICAL NURSE SPECIALIST

## 2024-10-28 NOTE — PROGRESS NOTES
[facility]  [facility] Department of Endocrinology Diabetes and Metabolism   [address], [place], [place], [address]   Phone: [phone]  Fax: [phone]    Date of Service: 10/28/2024  Primary Care Physician: Cabrera Ahn DO  Provider:ROSS Lynn - CNS     Reason for the visit:  Primary Hypothyroidism    History of Present Illness:  The history is provided by the patient. No  was used. Accuracy of the patient data is excellent.  Martha Salcido is a very pleasant 54 y.o. female seen today for follow up visit   The patent was diagnosed with hypothyroidism in her early 20's. She is currently on Tirosint 112 monday thru Saturday, 100 mcg on Sunday . Patient takes Tirosint in the morning at empty stomach, wait one hour before eating , avoid multivitamins containing calcium  or iron with it.      Pt has h/o gastric bypass surgery 16 years ago and since then she has been struggling to maintain normal  thyroid level     Lab Results   Component Value Date/Time    TSH 0.84 07/31/2024 11:12 AM    T4FREE 1.4 07/31/2024 11:12 AM    P3GQECD 82.29 06/06/2021 02:02 AM    TSI <0.10 10/03/2020 08:12 AM    TPOABS 2.6 10/03/2020 08:12 AM       MNG   Thyroid US 10/2019   Atrophic heterogenous  thyroid gland   Rt lobe measures 2.6 x 0.7 x 0.7 cm, Lt lobe measures 2.1 x \0.8 x 0.5 cm, isthmus measures 0.1 cm   Non specific calcification in interpolar region measures 0.2 cm      Thyroid US 12/2020 --> tiny 2 and 2 mm thyroid nodules     Martha Salcido reported some discomfort at thyroid level but denies any voice change, or shortness of breath. No family history of thyroid cancer. No prior history of radiation to head or neck region.    Thyroid US 6/24 stable when compared to previous        PAST MEDICAL HISTORY   Past Medical History:   Diagnosis Date    Atrial fibrillation (HCC)     Obesity     Prolonged emergence from general anesthesia     SENSITIVE TO MEDS, TAKES

## 2024-11-18 RX ORDER — LEVOTHYROXINE SODIUM 100 UG/1
CAPSULE ORAL
Qty: 12 CAPSULE | Refills: 1 | Status: SHIPPED
Start: 2024-11-18 | End: 2024-11-19 | Stop reason: SDUPTHER

## 2024-11-19 ENCOUNTER — TELEPHONE (OUTPATIENT)
Dept: ENDOCRINOLOGY | Age: 54
End: 2024-11-19

## 2024-11-19 DIAGNOSIS — E03.9 HYPOTHYROIDISM, UNSPECIFIED TYPE: ICD-10-CM

## 2024-11-19 RX ORDER — LEVOTHYROXINE SODIUM 112 UG/1
112 CAPSULE ORAL DAILY
Qty: 78 CAPSULE | Refills: 1 | Status: SHIPPED | OUTPATIENT
Start: 2024-11-19

## 2024-11-19 RX ORDER — LEVOTHYROXINE SODIUM 100 UG/1
CAPSULE ORAL
Qty: 12 CAPSULE | Refills: 1 | Status: SHIPPED | OUTPATIENT
Start: 2024-11-19

## 2024-11-19 NOTE — TELEPHONE ENCOUNTER
Pt notified. I left 2 dexcom 7 at  for pt. She is to call in week for a download. Pt is starting diet education now with [facility] where she had her gastric bypass done.

## 2024-11-19 NOTE — TELEPHONE ENCOUNTER
Patient forgot to discuss her reactive hypoglycemia concerns with you at her virtual appointment. She is very concerned about her insurance denial of the CGM and wants to know what needs done to get it approved. The insurance denial is in our media and states at least one insulin injection daily is required for coverage.  She wants to go over the reactive hypoglycemia concerns that she forgot to talk to you about stating it is very problematic

## 2024-11-19 NOTE — TELEPHONE ENCOUNTER
If patient would like to come in and if we have samples of the concepcion 3 or Dexcom G7's please give her some samples so that I can review it.  Once she has 1 on please have her call in 1 week so I can review.  Treatment for reactive hypoglycemia is diet education +- Precose.  Please inquire if patient had any diet education on reactive hypoglycemia.  If she has not had education please have her meet with Sandra to discuss

## 2025-02-26 ENCOUNTER — TELEPHONE (OUTPATIENT)
Dept: PULMONOLOGY | Age: 55
End: 2025-02-26

## 2025-02-26 NOTE — TELEPHONE ENCOUNTER
Patient called, she needs new order for CPAP supplies for [facility], Patient was last see on 08/13/2024, had appointment scheduled for 09/25/2024 which she NO Showed for.

## 2025-03-07 ENCOUNTER — OFFICE VISIT (OUTPATIENT)
Dept: PULMONOLOGY | Age: 55
End: 2025-03-07
Payer: COMMERCIAL

## 2025-03-07 VITALS
HEART RATE: 78 BPM | BODY MASS INDEX: 49.96 KG/M2 | HEIGHT: 63 IN | SYSTOLIC BLOOD PRESSURE: 130 MMHG | DIASTOLIC BLOOD PRESSURE: 74 MMHG | WEIGHT: 282 LBS | OXYGEN SATURATION: 98 % | TEMPERATURE: 98.4 F

## 2025-03-07 DIAGNOSIS — R06.00 DYSPNEA, UNSPECIFIED TYPE: ICD-10-CM

## 2025-03-07 DIAGNOSIS — J98.6 DIAPHRAGMATIC PARALYSIS: Primary | ICD-10-CM

## 2025-03-07 DIAGNOSIS — G47.33 OSA (OBSTRUCTIVE SLEEP APNEA): ICD-10-CM

## 2025-03-07 PROCEDURE — 99214 OFFICE O/P EST MOD 30 MIN: CPT | Performed by: INTERNAL MEDICINE

## 2025-03-07 RX ORDER — AZITHROMYCIN 250 MG/1
250 TABLET, FILM COATED ORAL DAILY
COMMUNITY
Start: 2025-01-03

## 2025-03-07 RX ORDER — FINASTERIDE 5 MG/1
5 TABLET, FILM COATED ORAL DAILY
COMMUNITY
Start: 2024-12-16

## 2025-03-07 ASSESSMENT — ENCOUNTER SYMPTOMS: SHORTNESS OF BREATH: 1

## 2025-03-07 NOTE — PROGRESS NOTES
Patient to follow up with physician in 1 month. Orders for CPAP will be sent to [facility]. Orders for PFT and Sniff test will be sent to [facility].

## 2025-03-07 NOTE — PROGRESS NOTES
Progress Note    Martha Salcido  1970    CC: Shortness of Breath       HPI : 54 year old female was referred to me for SOB, she was supposed to have SNIFF test and PFT but they were not frost, She goes to sleep clinic for CLINTON and needs CPAP supplies. Her chest x-ray showed probable right hemidiaphragmatic paralysis.     Past Medical History:   Diagnosis Date    Atrial fibrillation (HCC)     Obesity     Prolonged emergence from general anesthesia     SENSITIVE TO MEDS, TAKES LESS TO PUT HER UNDER, DIFFICULTY WAKING, STOPS BREATHING PER PATIENT    Thyroid disease       Past Surgical History:   Procedure Laterality Date    ABDOMEN SURGERY       SECTION      x2    CHOLECYSTECTOMY      COLONOSCOPY N/A 2020    COLORECTAL CANCER SCREENING, HIGH RISK performed by Arabella Molina MD at [facility] ENDOSCOPY    GASTRIC BYPASS SURGERY      HERNIA REPAIR      HYSTERECTOMY (CERVIX STATUS UNKNOWN)      TUBAL LIGATION      UPPER GASTROINTESTINAL ENDOSCOPY N/A 2020    EGD BIOPSY performed by Arabella Molina MD at [facility] ENDOSCOPY      No family history on file.   Social History     Socioeconomic History    Marital status:      Spouse name: None    Number of children: None    Years of education: None    Highest education level: None   Tobacco Use    Smoking status: Never    Smokeless tobacco: Never   Vaping Use    Vaping status: Never Used   Substance and Sexual Activity    Alcohol use: No    Drug use: No        Avelox [moxifloxacin hcl in nacl], Cephalexin, Ciprofloxacin, Clindamycin, Magnesium sulfate, Milk-related compounds, Penicillins, Percocet [oxycodone-acetaminophen], Sulfa antibiotics, Acetaminophen, and Oxycodone     Current Outpatient Medications:     TIROSINT 100 MCG CAPS, Take one capsule on , Disp: 12 capsule, Rfl: 1    TIROSINT 112 MCG CAPS, Take 112 mcg by mouth daily Take Monday-Saturday, Disp: 78 capsule, Rfl: 1    clotrimazole-betamethasone (LOTRISONE) 1-0.05 % cream, Apply topically 2

## 2025-04-14 ENCOUNTER — TELEMEDICINE (OUTPATIENT)
Dept: ENDOCRINOLOGY | Age: 55
End: 2025-04-14
Payer: COMMERCIAL

## 2025-04-14 DIAGNOSIS — E04.2 MULTINODULAR GOITER: ICD-10-CM

## 2025-04-14 DIAGNOSIS — E03.9 HYPOTHYROIDISM, UNSPECIFIED TYPE: Primary | ICD-10-CM

## 2025-04-14 DIAGNOSIS — E16.2 HYPOGLYCEMIA: ICD-10-CM

## 2025-04-14 DIAGNOSIS — E55.9 VITAMIN D DEFICIENCY: ICD-10-CM

## 2025-04-14 PROCEDURE — G2211 COMPLEX E/M VISIT ADD ON: HCPCS | Performed by: CLINICAL NURSE SPECIALIST

## 2025-04-14 PROCEDURE — 99214 OFFICE O/P EST MOD 30 MIN: CPT | Performed by: CLINICAL NURSE SPECIALIST

## 2025-04-14 RX ORDER — LEVOTHYROXINE SODIUM 112 UG/1
112 CAPSULE ORAL DAILY
Qty: 78 CAPSULE | Refills: 1 | Status: SHIPPED | OUTPATIENT
Start: 2025-04-14

## 2025-04-14 RX ORDER — LEVOTHYROXINE SODIUM 100 UG/1
CAPSULE ORAL
Qty: 12 CAPSULE | Refills: 1 | Status: SHIPPED | OUTPATIENT
Start: 2025-04-14

## 2025-04-14 NOTE — PROGRESS NOTES
[facility] [employer]  [facility] Department of Endocrinology Diabetes and Metabolism   [address], [place], [address]   Phone: [phone]  Fax: [phone]    Date of Service: 4/14/2025  Primary Care Physician: Cabrera Ahn DO  Provider:ROSS Lynn - CNS     Reason for the visit:  Primary Hypothyroidism    History of Present Illness:  The history is provided by the patient. No  was used. Accuracy of the patient data is excellent.  Martha Salcido is a very pleasant 55 y.o. female seen today for follow up visit   The patent was diagnosed with hypothyroidism in her early 20's. She is currently on Tirosint 112 monday thru Saturday, 100 mcg on Sunday . Patient takes Tirosint in the morning at empty stomach, wait one hour before eating , avoid multivitamins containing calcium  or iron with it.      Pt has h/o gastric bypass surgery 16 years ago and since then she has been struggling to maintain normal  thyroid level     Lab Results   Component Value Date/Time    TSH 0.84 07/31/2024 11:12 AM    T4FREE 1.4 07/31/2024 11:12 AM    F3FUWKY 82.29 06/06/2021 02:02 AM    TSI <0.10 10/03/2020 08:12 AM    TPOABS 2.6 10/03/2020 08:12 AM       MNG   Thyroid US 10/2019   Atrophic heterogenous  thyroid gland   Rt lobe measures 2.6 x 0.7 x 0.7 cm, Lt lobe measures 2.1 x \0.8 x 0.5 cm, isthmus measures 0.1 cm   Non specific calcification in interpolar region measures 0.2 cm      Thyroid US 12/2020 --> tiny 2 and 2 mm thyroid nodules     Martha Salcido reported some discomfort at thyroid level but denies any voice change, or shortness of breath. No family history of thyroid cancer. No prior history of radiation to head or neck region.    Thyroid US 6/24 stable when compared to previous        PAST MEDICAL HISTORY   Past Medical History:   Diagnosis Date    Atrial fibrillation (HCC)     Obesity     Prolonged emergence from general anesthesia     SENSITIVE TO MEDS, TAKES

## 2025-04-14 NOTE — PROGRESS NOTES
Martha Salcido was read the following message “We want to confirm that, for purposes of billing, this is a virtual visit with your provider for which we will submit a claim for reimbursement with your insurance company. You will be responsible for any copays, coinsurance amounts or other amounts not covered by your insurance company. If you do not accept this, unfortunately we will not be able to schedule a virtual visit with the provider. Do you accept? Martha responded Yes .

## 2025-07-03 DIAGNOSIS — E03.9 HYPOTHYROIDISM, UNSPECIFIED TYPE: ICD-10-CM

## 2025-07-03 LAB
T4 FREE: 1.3 NG/DL (ref 0.9–1.7)
TSH SERPL DL<=0.05 MIU/L-ACNC: 2.92 UIU/ML (ref 0.27–4.2)

## 2025-07-07 ENCOUNTER — RESULTS FOLLOW-UP (OUTPATIENT)
Dept: ENDOCRINOLOGY | Age: 55
End: 2025-07-07

## (undated) DEVICE — FORCEPS BX L160CM JAW DIA2.4MM YEL L CAP W/ NDL DISP RAD

## (undated) DEVICE — DEFENDO AIR WATER SUCTION AND BIOPSY VALVE KIT FOR  OLYMPUS: Brand: DEFENDO AIR/WATER/SUCTION AND BIOPSY VALVE

## (undated) DEVICE — CONTAINER SPEC 60ML PH 7NEUTRAL BUFF FRMLN RDY TO USE

## (undated) DEVICE — CONNECTOR TBNG AUX H2O JET DISP FOR OLY 160/180 SER

## (undated) DEVICE — GAUZE,SPONGE,POST-OP,4X3,STRL,LF: Brand: MEDLINE

## (undated) DEVICE — BLOCK BITE 60FR RUBBER ADLT DENTAL

## (undated) DEVICE — CANNULA NSL ORAL AD FOR CAPNOFLEX CO2 O2 AIRLFE